# Patient Record
Sex: FEMALE | Race: WHITE | NOT HISPANIC OR LATINO | Employment: OTHER | URBAN - NONMETROPOLITAN AREA
[De-identification: names, ages, dates, MRNs, and addresses within clinical notes are randomized per-mention and may not be internally consistent; named-entity substitution may affect disease eponyms.]

---

## 2017-03-10 ENCOUNTER — COMMUNICATION - GICH (OUTPATIENT)
Dept: FAMILY MEDICINE | Facility: OTHER | Age: 62
End: 2017-03-10

## 2017-03-10 DIAGNOSIS — I10 ESSENTIAL (PRIMARY) HYPERTENSION: ICD-10-CM

## 2017-08-16 ENCOUNTER — HOSPITAL ENCOUNTER (OUTPATIENT)
Dept: RADIOLOGY | Facility: OTHER | Age: 62
End: 2017-08-16
Attending: FAMILY MEDICINE

## 2017-08-16 ENCOUNTER — HISTORY (OUTPATIENT)
Dept: RADIOLOGY | Facility: OTHER | Age: 62
End: 2017-08-16

## 2017-08-16 DIAGNOSIS — Z12.31 ENCOUNTER FOR SCREENING MAMMOGRAM FOR MALIGNANT NEOPLASM OF BREAST: ICD-10-CM

## 2017-09-03 ENCOUNTER — COMMUNICATION - GICH (OUTPATIENT)
Dept: FAMILY MEDICINE | Facility: OTHER | Age: 62
End: 2017-09-03

## 2017-09-03 DIAGNOSIS — I10 ESSENTIAL (PRIMARY) HYPERTENSION: ICD-10-CM

## 2017-10-26 ENCOUNTER — OFFICE VISIT - GICH (OUTPATIENT)
Dept: FAMILY MEDICINE | Facility: OTHER | Age: 62
End: 2017-10-26

## 2017-10-26 ENCOUNTER — HISTORY (OUTPATIENT)
Dept: FAMILY MEDICINE | Facility: OTHER | Age: 62
End: 2017-10-26

## 2017-10-26 DIAGNOSIS — Z23 ENCOUNTER FOR IMMUNIZATION: ICD-10-CM

## 2017-10-26 DIAGNOSIS — R35.0 FREQUENCY OF MICTURITION: ICD-10-CM

## 2017-10-26 LAB
BACTERIA URINE: NORMAL BACTERIA/HPF
BILIRUB UR QL: NEGATIVE
CLARITY, URINE: CLEAR CLARITY
COLOR UR: YELLOW COLOR
EPITHELIAL CELLS: NORMAL EPI/HPF
GLUCOSE URINE: NEGATIVE MG/DL
KETONES UR QL: NEGATIVE MG/DL
LEUKOCYTE ESTERASE URINE: NEGATIVE
NITRITE UR QL STRIP: NEGATIVE
OCCULT BLOOD,URINE - HISTORICAL: ABNORMAL
PH UR: 6 [PH]
PROTEIN QUALITATIVE,URINE - HISTORICAL: NEGATIVE MG/DL
RBC - HISTORICAL: NORMAL /HPF
SP GR UR STRIP: <=1.005
UROBILINOGEN,QUALITATIVE - HISTORICAL: NORMAL EU/DL
WBC - HISTORICAL: NORMAL /HPF

## 2017-11-30 ENCOUNTER — COMMUNICATION - GICH (OUTPATIENT)
Dept: FAMILY MEDICINE | Facility: OTHER | Age: 62
End: 2017-11-30

## 2017-11-30 DIAGNOSIS — I10 ESSENTIAL (PRIMARY) HYPERTENSION: ICD-10-CM

## 2017-12-17 ENCOUNTER — HEALTH MAINTENANCE LETTER (OUTPATIENT)
Age: 62
End: 2017-12-17

## 2017-12-22 ENCOUNTER — COMMUNICATION - GICH (OUTPATIENT)
Dept: FAMILY MEDICINE | Facility: OTHER | Age: 62
End: 2017-12-22

## 2017-12-22 DIAGNOSIS — E03.9 HYPOTHYROIDISM: ICD-10-CM

## 2017-12-27 NOTE — PROGRESS NOTES
Patient Information     Patient Name MRN Sex Laura Reyes [id number] Female 1955      Progress Notes by Ann Schulz at 2017  9:59 AM     Author:  Ann Schulz Service:  (none) Author Type:  (none)     Filed:  2017  9:59 AM Date of Service:  2017  9:59 AM Status:  Signed     :  Ann Schulz            Falls Risk Criteria:    Age 65 and older or under age 4        Sensory deficits    Poor vision    Use of ambulatory aides    Impaired judgment    Unable to walk independently    Meets High Risk criteria for falls:  no

## 2017-12-28 NOTE — TELEPHONE ENCOUNTER
Patient Information     Patient Name MRN Sex Laura Reyes [id number] Female 1955      Telephone Encounter by Emelina Bledsoe RN at 2017  2:14 PM     Author:  Emelina Bledsoe RN Service:  (none) Author Type:  NURS- Registered Nurse     Filed:  2017  2:17 PM Encounter Date:  9/3/2017 Status:  Signed     :  Emelina Bledsoe RN (NURS- Registered Nurse)            Ace Inhibitors    Office visit in the past 12 months or per provider note.    Last visit with ERMIAS SENA was on: 2016 in [facility]  Next visit with ERMIAS SENA is on: No future appointment listed with this provider  Next visit with Family Practice is on: No future appointment listed in this department    Lab test requirements:  Creatinine and Potassium annually, if ordering lab, order BMP.  CREATININE (mg/dL)    Date Value   2016 0.74     POTASSIUM (mmol/L)    Date Value   2016 4.4       Max refill for 12 months from last office visit or per provider note    Limited refill provided - patient is due for annual check and labs in November.    Prescription refilled per RN Medication Refill Policy.................... Emelina Bledsoe RN ....................  2017   2:14 PM

## 2017-12-28 NOTE — TELEPHONE ENCOUNTER
Patient Information     Patient Name MRN Sex Laura Reyes [id number] Female 1955      Telephone Encounter by Patt Calle RN at 2017  3:36 PM     Author:  Patt Calle RN Service:  (none) Author Type:  NURS- Registered Nurse     Filed:  2017  3:38 PM Encounter Date:  2017 Status:  Signed     :  Patt Calle RN (NURS- Registered Nurse)            Ace Inhibitors    Office visit in the past 12 months or per provider note.    Last visit with ERMIAS SENA was on: 10/26/2017 in [facility]  Next visit with ERMIAS SENA is on: No future appointment listed with this provider  Next visit with Family Practice is on: No future appointment listed in this department    Lab test requirements:  Creatinine and Potassium annually, if ordering lab, order BMP.  CREATININE (mg/dL)    Date Value   2016 0.74     POTASSIUM (mmol/L)    Date Value   2016 4.4       Max refill for 12 months from last office visit or per provider note    Patient is due for medication management appointment. Limited refill provided at this time. [employer] message and/or letter sent for reminder to patient. Prescription refilled per RN Medication Refill Policy.................... Patt Calle RN ....................  2017   3:37 PM

## 2017-12-28 NOTE — PATIENT INSTRUCTIONS
Patient Information     Patient Name MRN Laura Torres [id number] Female 1955      Patient Instructions by Adelia Burgess MD at 10/26/2017  2:52 PM     Author:  Adelia Burgess MD  Service:  (none) Author Type:  Physician     Filed:  10/26/2017  2:53 PM  Encounter Date:  10/26/2017 Status:  Addendum     :  Adelia Burgess MD (Physician)        Related Notes: Original Note by Adelia Burgess MD (Physician) filed at 10/26/2017  2:52 PM            1.  Start bactrim;   2.  Urine will be cultured   3.  Stretching exercises for sciatica as you have previously received.  4. Ice or Aspercreme or Salon-Pas  over-the-counter prn      [employer] Citizen of [place]   Urinary Tract Infection in Women   ________________________________________________________________________  KEY POINTS    A urinary tract infection is an infection of your kidneys, ureters, bladder, or urethra.    Your healthcare provider will likely prescribe an antibiotic and medicine to help relieve burning and discomfort.    Follow the full course of treatment prescribed by your healthcare provider. If you were prescribed an antibiotic, take all of it as prescribed, even if your symptoms are gone.  ________________________________________________________________________  What is a urinary tract infection?  Urinary tract infection (UTI) is an infection of one or more parts of the urinary tract. The urinary tract includes your:    Kidneys, which make urine    Ureters, which are the tubes that carry urine from the kidneys to the bladder    Bladder, which stores urine    Urethra, which is the tube that drains urine from the bladder  What is the cause?  Urinary tract infection is usually caused by bacteria. Normally the urinary tract does not have any bacteria or other organisms in it. Bacteria that cause a UTI often spread from the rectum or vagina to the urethra and then to the bladder or kidneys. Urinary tract  infection is common in women because the urethra is short. This makes it easier for bacteria to move up to the bladder. Sometimes bacteria spread from another part of the body through the bloodstream to the urinary tract.  Some of the things that can lead to an infection are:    A blockage in the urinary tract, such as a kidney stone    A sexually transmitted disease or infection (also called an STD or STI)    Getting older, when it may get harder to empty and flush out the bladder completely    Having medical problems such as diabetes, a problem with the immune system, sickle cell anemia, stroke, kidney stones, or any illness or disability that makes it hard to empty your bladder completely    Use of a catheter to drain the bladder    Scarring in the urinary tract from previous infections or surgery  You are more likely to have an infection if:    You just started having sex or have a new sex partner    You are past menopause    You are pregnant  What are the symptoms?  Symptoms may include:    Urinating more often    Feeling an urgent need to urinate or feeling that your bladder is always full    Pain or burning when you urinate    Pain in your lower belly, low back, or your side    Urine that smells bad    Urine that looks cloudy, reddish, or bloody    Fever and chills or sweating    Nausea and vomiting    Leaking of urine    Pain during sex  How is it diagnosed?  Your healthcare provider will ask about your symptoms and medical history and examine you. Tests to diagnose a simple urinary tract infection may include:    Urine tests    Blood tests  If you are having more serious symptoms or frequent infections, you may need one or more tests:    An intravenous pyelogram (IVP), which is a series of X-rays taken after your healthcare provider injects contrast dye into your blood vessels to look for blockages in your kidneys and urinary tract    An ultrasound, which uses sound waves to show pictures of the kidneys and  urinary tract    A pelvic exam    A cystoscopy, which uses a slim, flexible, lighted tube passed through your urethra into your bladder, and usually done by a specialist called a urologist  How is it treated?  Your healthcare provider will most likely prescribe an antibiotic and medicine to help relieve burning and discomfort. Prompt treatment of a UTI usually relieves the symptoms in 1 to 2 days. If your infection has been causing symptoms for several days before treatment or if you have a fever, it may take longer to feel better.  It s important to get prompt treatment for a UTI. If the infection is not treated, it could damage your kidneys and make you very sick. If the infection spreads to your blood, it can be life-threatening. If you are very sick, you may need to be in the hospital and get antibiotics by IV.  How can I take care of myself?  Follow the full course of treatment prescribed by your healthcare provider. If you were prescribed an antibiotic medicine, take the antibiotics for as long as your healthcare provider prescribes, even if you feel better. If you stop taking the medicine too soon, you may not kill all of the bacteria and you may get sick again. If you have side effects from your medicine, talk to your healthcare provider.  Ask your provider:    How and when you will get your test results    How long it will take to recover    If there are activities you should avoid and when you can return to your normal activities    How to take care of yourself at home    What symptoms or problems you should watch for and what to do if you have them    Make sure you know when you should come back for a checkup.    Drink plenty of water each day to cleanse your bladder and urinary tract unless your healthcare provider has told you to limit how much fluid you drink.    Soaking in a tub of warm water for 20 to 30 minutes may help relieve pain.  How can I help prevent urinary tract infection?  You can help  prevent UTIs if you:    Drink enough liquids to keep your urine light yellow in color.    Drink a glass of cranberry juice each day. The juice should be real cranberry juice, not a cranberry-flavored drink.    Don t wait to go to the bathroom if you feel the need to urinate.    Practice safe sex:    Ask your healthcare provider which type of condom, diaphragm, or other birth control is right for you.    Urinate soon after sex.    Keep your genital area clean. If you want to have vaginal sex after anal sex, both partners should wash their genitals first.    Empty your bladder completely when you urinate.    Don t wear a wet bathing suit for long periods of time.    Don t use irritating cosmetics or chemicals in your genital area. This includes, for example, strong soaps, feminine hygiene sprays, douches, scented tampons, sanitary napkins, or panty liners.    Keep your vaginal area clean. Wiping from front to back after using the toilet may help prevent infections. Use mild, unscented soap to wash your genital area gently each time you bathe or shower.    Wear underwear that is all cotton or has a cotton crotch. Pantyhose should also have a cotton crotch. Cotton absorbs moisture better than nylon. Change underwear and pantyhose every day.    During pregnancy, tell your healthcare provider if you often have urinary tract problems.  If you have reached menopause and are not taking estrogen, prescription estrogen vaginal cream may help prevent bladder infections.  Developed by [employer].  Adult Advisor 2016.2 published by [employer].  Last modified: 2016-04-27  Last reviewed: 2016-04-26  This content is reviewed periodically and is subject to change as new health information becomes available. The information is intended to inform and educate and is not a replacement for medical evaluation, advice, diagnosis or treatment by a healthcare professional.  References   Adult Advisor 2016.2 Index    Copyright   2016  [employer], a division of [employer] All rights reserved.

## 2017-12-28 NOTE — PROGRESS NOTES
"Patient Information     Patient Name MRN Sex Laura Reyes [id number] Female 1955      Progress Notes by Adelia Burgess MD at 10/26/2017  1:30 PM     Author:  Adelia Burgess MD Service:  (none) Author Type:  Physician     Filed:  10/26/2017  6:21 PM Encounter Date:  10/26/2017 Status:  Signed     :  Adelia Burgess MD (Physician)            Nursing Notes:   Ivonne Gupta  10/26/2017  1:53 PM  Signed  Patient presents for back pain and increase frequency in urine   Ivonne Gupta LPN........................10/26/2017  1:49 PM         SUBJECTIVE: Laura Romero is a 62 y.o. female who complains of urinary frequency, urgency and low back pain on left side  x 3 days, without flank pain, fever, chills, or abnormal vaginal discharge or bleeding.     Allergies     Allergen  Reactions     Azithromycin Hives     /72  Pulse 72  Ht 1.613 m (5' 3.5\")  Wt 111.6 kg (246 lb)  Breastfeeding? No  BMI 42.89 kg/m2      OBJECTIVE: Appears well, in no apparent distress. The abdomen is soft without tenderness, guarding, mass, rebound or organomegaly. No CVA tenderness or inguinal adenopathy noted.     Results for orders placed or performed in visit on 10/26/17      URINALYSIS W REFLEX MICROSCOPIC IF POSITIVE      Result  Value Ref Range    COLOR                     Yellow Yellow Color    CLARITY                   Clear Clear Clarity    SPECIFIC GRAVITY,URINE    <=1.005 (A) 1.010, 1.015, 1.020, 1.025                    PH,URINE                  6.0 6.0, 7.0, 8.0, 5.5, 6.5, 7.5, 8.5                    UROBILINOGEN,QUALITATIVE  Normal Normal EU/dl    PROTEIN, URINE Negative Negative mg/dL    GLUCOSE, URINE Negative Negative mg/dL    KETONES,URINE             Negative Negative mg/dL    BILIRUBIN,URINE           Negative Negative                    OCCULT BLOOD,URINE        Trace (A) Negative                    NITRITE                   Negative Negative                    LEUKOCYTE " ESTERASE        Negative Negative                   URINALYSIS MICROSCOPIC      Result  Value Ref Range    RBC 0-2 0-2, None Seen /HPF    WBC None Seen 0-2, 3-5, None Seen /HPF    BACTERIA                  None Seen None Seen, Rare, Occasional, Few Bacteria/HPF    EPITHELIAL CELLS          None Seen None Seen, Few Epi/HPF       ASSESSMENT: UTI uncomplicated without evidence of pyelonephritis    PLAN: Treatment per orders - also push fluids, may use Pyridium OTC prn. Call or return to clinic prn if these symptoms worsen or fail to improve as anticipated.  [place]     Ms. Romero's Body mass index is 42.89 kg/(m^2). This is out of the normal range for a 62 y.o. Normal range for ages 18+ is between 18.5 and 24.9. To lose weight we reviewed risks and benefits of appropriate options such as diet, exercise, and medications. Patient's strategy will be  self-directed nutrition plan and self-directed exercise program      Flu shot / boostrix today

## 2017-12-30 NOTE — NURSING NOTE
Patient Information     Patient Name MRN Sex Laura Reyes [id number] Female 1955      Nursing Note by Ivonne Gupta at 10/26/2017  1:30 PM     Author:  Ivonne Gupta Service:  (none) Author Type:  (none)     Filed:  10/26/2017  1:53 PM Encounter Date:  10/26/2017 Status:  Signed     :  Ivonne Gupta            Patient presents for back pain and increase frequency in urine   Ivonne Gupta LPN........................10/26/2017  1:49 PM

## 2018-01-03 NOTE — TELEPHONE ENCOUNTER
Patient Information     Patient Name MRN Sex Laura Reyes [id number] Female 1955      Telephone Encounter by Emelina Bledsoe RN at 3/10/2017  1:00 PM     Author:  Emelina Blesdoe RN Service:  (none) Author Type:  (none)     Filed:  3/10/2017  1:05 PM Encounter Date:  3/10/2017 Status:  Signed     :  Emelina Bledsoe RN (NURS- Registered Nurse)            Ace Inhibitors    Office visit in the past 12 months or per provider note.    Last visit with ERMIAS SENA was on: 2016 in [facility]  Next visit with ERMIAS SENA is on: No future appointment listed with this provider  Next visit with Family Practice is on: No future appointment listed in this department    Lab test requirements:  Creatinine and Potassium annually, if ordering lab, order BMP.  CREATININE (mg/dL)    Date Value   2016 0.74     POTASSIUM (mmol/L)    Date Value   2016 4.4       Max refill for 12 months from last office visit or per provider note    Prescription refilled per RN Medication Refill Policy.................... Emelina Bledsoe ....................  3/10/2017   1:00 PM

## 2018-01-11 ENCOUNTER — COMMUNICATION - GICH (OUTPATIENT)
Dept: FAMILY MEDICINE | Facility: OTHER | Age: 63
End: 2018-01-11

## 2018-01-11 DIAGNOSIS — E78.00 PURE HYPERCHOLESTEROLEMIA: ICD-10-CM

## 2018-01-26 VITALS
WEIGHT: 246 LBS | HEIGHT: 64 IN | HEART RATE: 72 BPM | DIASTOLIC BLOOD PRESSURE: 72 MMHG | BODY MASS INDEX: 42 KG/M2 | SYSTOLIC BLOOD PRESSURE: 124 MMHG

## 2018-02-08 ENCOUNTER — DOCUMENTATION ONLY (OUTPATIENT)
Dept: FAMILY MEDICINE | Facility: OTHER | Age: 63
End: 2018-02-08

## 2018-02-08 PROBLEM — E66.9 OBESITY: Status: ACTIVE | Noted: 2018-02-08

## 2018-02-08 PROBLEM — I83.90 ASYMPTOMATIC VARICOSE VEINS: Status: ACTIVE | Noted: 2018-02-08

## 2018-02-08 PROBLEM — L71.9 ROSACEA: Status: ACTIVE | Noted: 2018-02-08

## 2018-02-08 PROBLEM — E78.00 HYPERCHOLESTEROLEMIA: Status: ACTIVE | Noted: 2018-02-08

## 2018-02-08 RX ORDER — ALBUTEROL SULFATE 90 UG/1
2 AEROSOL, METERED RESPIRATORY (INHALATION) EVERY 4 HOURS PRN
COMMUNITY
Start: 2016-11-03 | End: 2019-04-23

## 2018-02-08 RX ORDER — PRAVASTATIN SODIUM 40 MG
40 TABLET ORAL AT BEDTIME
COMMUNITY
Start: 2016-11-03 | End: 2018-02-28

## 2018-02-08 RX ORDER — CETIRIZINE HYDROCHLORIDE 10 MG/1
10 TABLET ORAL DAILY
COMMUNITY
Start: 2012-12-12

## 2018-02-08 RX ORDER — ASPIRIN 81 MG/1
81 TABLET ORAL
COMMUNITY
Start: 2010-09-29 | End: 2022-12-05

## 2018-02-08 RX ORDER — FLUTICASONE PROPIONATE 50 MCG
2 SPRAY, SUSPENSION (ML) NASAL DAILY
COMMUNITY
Start: 2016-11-03 | End: 2018-02-28

## 2018-02-08 RX ORDER — LEVOTHYROXINE SODIUM 100 UG/1
100 TABLET ORAL
COMMUNITY
Start: 2017-12-22 | End: 2018-02-28

## 2018-02-08 RX ORDER — LISINOPRIL 20 MG/1
20 TABLET ORAL DAILY
COMMUNITY
Start: 2017-11-30 | End: 2018-02-28

## 2018-02-12 NOTE — TELEPHONE ENCOUNTER
Patient Information     Patient Name MRN Sex Laura Reyes [id number] Female 1955      Telephone Encounter by Patt Calle RN at 2017  4:40 PM     Author:  Patt Calle RN Service:  (none) Author Type:  NURS- Registered Nurse     Filed:  2017  4:40 PM Encounter Date:  2017 Status:  Signed     :  Patt Calle RN (NURS- Registered Nurse)            Hypothyroidism    Office visit in the past 12 months or per provider note.    Last visit with ERMIAS SENA was on: 10/26/2017 in [facility]  Next visit with ERMIAS SENA is on: No future appointment listed with this provider  Next visit with Family Practice is on: No future appointment listed in this department    Lab testing requirements:  TSH annually  TSH (uIU/mL)    Date Value   2016 0.51       Max refill for 12 months from last office visit or per provider note.  Patient is due for medication management appointment. Limited refill provided at this time. [employer] message and/or letter sent for reminder to patient. Prescription refilled per RN Medication Refill Policy.................... Patt Calle RN ....................  2017   4:40 PM

## 2018-02-12 NOTE — TELEPHONE ENCOUNTER
Patient Information     Patient Name MRN Laura Torres [id number] Female 1955      Telephone Encounter by Patt Calle RN at 1/15/2018 10:28 AM     Author:  Patt Calle RN Service:  (none) Author Type:  NURS- Registered Nurse     Filed:  1/15/2018 10:30 AM Encounter Date:  2018 Status:  Signed     :  Patt Calle RN (NURS- Registered Nurse)            Statins    Office visit in the past 12 months.    Last visit with ERMIAS SENA was on: 10/26/2017 in [facility]  Next visit with ERMIAS SENA is on: 2018 in [facility]  Next visit with Family Practice is on: 2018 in [facility]    Lab testing requirements:  Lipids annually.  Repeat lipids 6-8 weeks after dosage or drug change.    Last Lipids:  Chol: 216    11/3/2016  T    11/3/2016  HDL:   63    11/3/2016  LDL:  127    11/3/2016  LDL DIRECT:  No results found in past 5 years    .    Concommitant use of fibrates and statins-If it is an addition to the medication list, review note and/or discuss with provider.  If already on medication list, refill.    Max refills 12 months from last office visit.      Patient is due for medication management appointment. Limited refill provided at this time. [employer] message and/or letter sent for reminder to patient. Prescription refilled per RN Medication Refill Policy.................... Patt Calle RN ....................  1/15/2018   10:30 AM

## 2018-02-23 ENCOUNTER — DOCUMENTATION ONLY (OUTPATIENT)
Dept: FAMILY MEDICINE | Facility: OTHER | Age: 63
End: 2018-02-23

## 2018-02-28 ENCOUNTER — OFFICE VISIT (OUTPATIENT)
Dept: FAMILY MEDICINE | Facility: OTHER | Age: 63
End: 2018-02-28
Attending: FAMILY MEDICINE
Payer: COMMERCIAL

## 2018-02-28 VITALS
DIASTOLIC BLOOD PRESSURE: 70 MMHG | HEART RATE: 72 BPM | HEIGHT: 64 IN | WEIGHT: 242 LBS | SYSTOLIC BLOOD PRESSURE: 110 MMHG | BODY MASS INDEX: 41.32 KG/M2

## 2018-02-28 DIAGNOSIS — E78.00 HYPERCHOLESTEROLEMIA: ICD-10-CM

## 2018-02-28 DIAGNOSIS — E03.9 HYPOTHYROIDISM, UNSPECIFIED TYPE: ICD-10-CM

## 2018-02-28 DIAGNOSIS — L98.9 SKIN LESION: ICD-10-CM

## 2018-02-28 DIAGNOSIS — J30.1 CHRONIC ALLERGIC RHINITIS DUE TO POLLEN, UNSPECIFIED SEASONALITY: ICD-10-CM

## 2018-02-28 DIAGNOSIS — Z00.00 ROUTINE GENERAL MEDICAL EXAMINATION AT A HEALTH CARE FACILITY: Primary | ICD-10-CM

## 2018-02-28 DIAGNOSIS — L71.9 ROSACEA: ICD-10-CM

## 2018-02-28 DIAGNOSIS — I15.9 SECONDARY HYPERTENSION: ICD-10-CM

## 2018-02-28 LAB
ALBUMIN SERPL-MCNC: 4.5 G/DL (ref 3.5–5.7)
ALP SERPL-CCNC: 67 U/L (ref 34–104)
ALT SERPL W P-5'-P-CCNC: 18 U/L (ref 7–52)
ANION GAP SERPL CALCULATED.3IONS-SCNC: 6 MMOL/L (ref 3–14)
AST SERPL W P-5'-P-CCNC: 19 U/L (ref 13–39)
BILIRUB SERPL-MCNC: 0.6 MG/DL (ref 0.3–1)
BUN SERPL-MCNC: 17 MG/DL (ref 7–25)
CALCIUM SERPL-MCNC: 8.8 MG/DL (ref 8.6–10.3)
CHLORIDE SERPL-SCNC: 105 MMOL/L (ref 98–107)
CHOLEST SERPL-MCNC: 175 MG/DL
CO2 SERPL-SCNC: 30 MMOL/L (ref 21–31)
CREAT SERPL-MCNC: 0.74 MG/DL (ref 0.6–1.2)
ERYTHROCYTE [DISTWIDTH] IN BLOOD BY AUTOMATED COUNT: 16.3 % (ref 10–15)
GFR SERPL CREATININE-BSD FRML MDRD: 79 ML/MIN/1.7M2
GLUCOSE SERPL-MCNC: 91 MG/DL (ref 70–105)
HCT VFR BLD AUTO: 39 % (ref 35–47)
HDLC SERPL-MCNC: 67 MG/DL (ref 23–92)
HGB BLD-MCNC: 12.6 G/DL (ref 11.7–15.7)
LDLC SERPL CALC-MCNC: 82 MG/DL
MCH RBC QN AUTO: 26.9 PG (ref 26.5–33)
MCHC RBC AUTO-ENTMCNC: 32.3 G/DL (ref 31.5–36.5)
MCV RBC AUTO: 83 FL (ref 78–100)
NONHDLC SERPL-MCNC: 108 MG/DL
PLATELET # BLD AUTO: 261 10E9/L (ref 150–450)
POTASSIUM SERPL-SCNC: 4.2 MMOL/L (ref 3.5–5.1)
PROT SERPL-MCNC: 6.9 G/DL (ref 6.4–8.9)
RBC # BLD AUTO: 4.68 10E12/L (ref 3.8–5.2)
SODIUM SERPL-SCNC: 141 MMOL/L (ref 134–144)
TRIGL SERPL-MCNC: 129 MG/DL
TSH SERPL DL<=0.05 MIU/L-ACNC: 2.32 IU/ML (ref 0.34–5.6)
WBC # BLD AUTO: 6.5 10E9/L (ref 4–11)

## 2018-02-28 PROCEDURE — 85027 COMPLETE CBC AUTOMATED: CPT | Performed by: FAMILY MEDICINE

## 2018-02-28 PROCEDURE — 99396 PREV VISIT EST AGE 40-64: CPT | Performed by: FAMILY MEDICINE

## 2018-02-28 PROCEDURE — 80061 LIPID PANEL: CPT | Performed by: FAMILY MEDICINE

## 2018-02-28 PROCEDURE — 84443 ASSAY THYROID STIM HORMONE: CPT | Performed by: FAMILY MEDICINE

## 2018-02-28 PROCEDURE — 36415 COLL VENOUS BLD VENIPUNCTURE: CPT | Performed by: FAMILY MEDICINE

## 2018-02-28 PROCEDURE — 86803 HEPATITIS C AB TEST: CPT | Performed by: FAMILY MEDICINE

## 2018-02-28 PROCEDURE — 80053 COMPREHEN METABOLIC PANEL: CPT | Performed by: FAMILY MEDICINE

## 2018-02-28 RX ORDER — KRILL/OM-3/DHA/EPA/PHOSPHO/AST 500MG-86MG
1 CAPSULE ORAL DAILY
COMMUNITY
Start: 2018-02-28 | End: 2022-07-25

## 2018-02-28 RX ORDER — FLUTICASONE PROPIONATE 50 MCG
2 SPRAY, SUSPENSION (ML) NASAL DAILY
Qty: 1 BOTTLE | Refills: 3 | Status: SHIPPED | OUTPATIENT
Start: 2018-02-28 | End: 2020-08-27

## 2018-02-28 RX ORDER — LEVOTHYROXINE SODIUM 100 UG/1
100 TABLET ORAL
Qty: 90 TABLET | Refills: 3 | Status: SHIPPED | OUTPATIENT
Start: 2018-02-28 | End: 2019-03-23

## 2018-02-28 RX ORDER — AZELAIC ACID 0.15 G/G
0.5 GEL TOPICAL 2 TIMES DAILY
Qty: 50 G | Refills: 3 | Status: SHIPPED | OUTPATIENT
Start: 2018-02-28 | End: 2019-04-23

## 2018-02-28 RX ORDER — METRONIDAZOLE 7.5 MG/G
GEL TOPICAL 2 TIMES DAILY
Qty: 45 G | Refills: 3 | Status: SHIPPED | OUTPATIENT
Start: 2018-02-28 | End: 2020-02-27

## 2018-02-28 RX ORDER — LISINOPRIL 20 MG/1
20 TABLET ORAL DAILY
Qty: 90 TABLET | Refills: 3 | Status: SHIPPED | OUTPATIENT
Start: 2018-02-28 | End: 2019-03-04

## 2018-02-28 RX ORDER — LEVOTHYROXINE SODIUM 100 UG/1
100 TABLET ORAL
Qty: 90 TABLET | Refills: 3 | Status: CANCELLED | OUTPATIENT
Start: 2018-02-28

## 2018-02-28 RX ORDER — PRAVASTATIN SODIUM 40 MG
40 TABLET ORAL AT BEDTIME
Qty: 90 TABLET | Refills: 3 | Status: SHIPPED | OUTPATIENT
Start: 2018-02-28 | End: 2019-04-23

## 2018-02-28 ASSESSMENT — PAIN SCALES - GENERAL: PAINLEVEL: NO PAIN (0)

## 2018-02-28 NOTE — NURSING NOTE
Patient presents for yearly physical  And would like you to over look her skin.   Ivonne Gupta LPN........................2/28/2018  8:41 AM

## 2018-02-28 NOTE — MR AVS SNAPSHOT
After Visit Summary   2018    Laura Romero    MRN: [id number]           Patient Information     Date Of Birth          1955        Visit Information        Provider Department      2018 8:30 AM Adelia Burgess MD [facility]        Today's Diagnoses     Routine general medical examination at a health care facility    -  1    Hypercholesterolemia        Hypothyroidism, unspecified type        Secondary hypertension        Chronic allergic rhinitis due to pollen, unspecified seasonality        Rosacea        Skin lesion          Care Instructions    Labs will be mailed to your home.   Work on following  a mediterranean diet; Use monosaturated fats ( olive , canola and peanut   oil; nuts, avocados), abundance of plant foods which are minimally processed, fish (salmon).  Exercise 30minutes every day  [facility] staff will call you with your mammogram  Annually.    Try to get 7-8 hours sleep each night.  Rest is important!    Derm referral to [facility]    Rx for rosacea cream     Need to have lesion on anterior chest wall excised and sent for pathology .       Monthly Mole Check Chart  Anyone can get skin cancer. It doesn t matter what your skin color is. Doing a monthly skin check is an important way to spot early signs of melanoma. Melanoma is the deadliest type of skin cancer. But if it s found early, it can be treated. Regular skin checks can help you track any changes in your skin.  Getting started  Each month, check your body for any spots such as freckles, age spots, and moles. Use this sheet to help you by doing the followin. Number each spot you find on the images below.  2. Record the details for each spot, along with the date, on the chart at the bottom of the page.  3. Keep all of your completed charts. This will help you track any changes in your skin over time.  What to look for  When doing a skin check, be sure to use the ABCDEs of melanoma. This means  checking spots and moles for the following:      Asymmetry. One half of the mole is not like the other half.    Border. The edges are not smooth but ragged, notched, or blurred.    Color. Color varies from 1 part of the mole to another and may be tan, brown, or black. In some cases the color can be white, red, or blue.    Diameter. The mole is larger than 6 mm (size of a pencil eraser).    Evolving. The mole is getting larger or changing its shape or color.  How to check  Stand before a full-length mirror and check all parts of your body. For spots on your back or other areas you can't see, have a family member or friend do this for you. Or you can also use a hand mirror to check hard-to-see areas such as your back, buttocks, back of the neck, and scalp. To get a better look when checking your scalp, part your hair.  When to call your healthcare provider  Call your healthcare provider if any of your moles:    Hurt    Itch    Ooze    Bleed    Thicken    Become crusty    Show any of the ABCDEs of melanoma  Monthly Skin Check Chart  Date       Mole #    Asymmetry:  What is the mole's shape? Border of mole Color of mole Diameter of mole Evolving: How has mole changed?                                                                                                   Date Last Reviewed: 3/1/2017    2565-7954 [employer]. [address] [place] [address], [place] [address]. All rights reserved. This information is not intended as a substitute for professional medical care. Always follow your healthcare professional's instructions.        Understanding Mole Excision  Moles are skin growths that are darker than the surrounding skin. They are common and are not normally a problem. But moles can sometimes cause problems. In certain cases, a type of skin cancer called melanoma can grow in or near the mole. In other cases, a mole may be bothersome. In either case, removal (excision) of a problem mole can be done.  Why mole  excision is done  Your healthcare provider may do a mole excision for one or more reasons:    Part or all of a suspicious mole may be removed to check it for cancer.    A mole that is constantly rubbed by clothing or irritated in other ways may be removed to help make you more comfortable.    A mole that is large or on a visible body part can be removed for cosmetic reasons.  How mole excision is done  Removing a mole is often done in the healthcare provider s office. You usually go home the same day.    The area is cleaned. It is then injected with medicine (anesthetic) to numb it.    The provider cuts out the mole. He or she may also remove a certain amount of healthy tissue around the mole to make sure the margins are clear of any dangerous cells.    If needed, the incision may be closed with sutures or staples.  Risks of mole excision    Damage to nearby nerves    Infection of the incision    Keloid or too much scar tissue forms    Pain in the area    Recurrence of the mole    Scarring  Preventing skin cancer  To help protect yourself from skin cancer:    Check your skin regularly for changes in your moles and for new moles.    See your healthcare provider if you have a mole that bleeds, itches, or changes in size, color, or shape.    If you have many moles or have a family history of skin cancer, have moles checked by your healthcare provider at least once a year.    Use clothing and sunscreen that is SPF 30 or higher to protect your skin from the sun.    Never use tanning beds.   Date Last Reviewed: 5/1/2016 2000-2017 [employer]. [address] [place] [address]. All rights reserved. This information is not intended as a substitute for professional medical care. Always follow your healthcare professional's instructions.        Rosacea  Rosacea is a long-lasting (chronic) skin condition affecting the face. In the early stages it causes easy flushing or blushing. Redness may become  long-term (permanent) as the small blood vessels of the face widen (dilate). There may be small, red, pus-filled bumps (pustules). It looks like a bad case of acne, and has been called adult acne. But it is not caused by the same things that cause acne.  Rosacea is a chronic illness. You will have flare-ups that come and go. This may happen every few weeks or every few months. Experts don't know what causes rosacea. If not treated, it tends to get worse over time.  Some men with a more severe form of rosacea develop a condition called rhinophyma. The oil glands on the skin of the nose become blocked and the nose gets bigger. The cheeks also become puffy. Alcohol may increase the flushing. But this condition is not caused by alcohol use.  Rosacea can be treated with topical gels and creams. Oral antibiotics are used for more severe cases. You should see improvement in the first 4 weeks. Dilated blood vessels can be treated with a small electric needle or laser surgery. Rhinophyma can be treated with surgery. Or it may be treated by surgically scraping the skin (dermabrasion).  Home care    Avoid things that make your face red or flushed. These include hot drinks, spicy foods, caffeine, and alcohol.    Exercise indoors or in a cool area to avoid getting overheated.    Avoid excess sun exposure. Use a sunscreen with at least SPF 15 or higher.    Avoid extreme hot or extreme cold weather.    Don't scrub your face. That will irritate the skin and increase redness.    Avoid harsh soaps or moisturizers with irritating ingredients. You may need to use hypoallergenic cosmetics.    Avoid over-the-counter treatments unless instructed by your healthcare provider. Some of these treatments may make rosacea worse.    Try to figure out what triggers your flares (such as stress, sun exposure, or certain foods).  Follow-up care  Follow up with your healthcare provider, or as advised. Contact your provider if your condition is not  responding to the medicines you were given. Getting treatment early can stop it from getting worse.  When to seek medical advice  Call your healthcare provider right away if you have redness, burning, or a gritty feeling in your eyes.  Date Last Reviewed: 8/1/2016 2000-2017 [employer]. [address] [place] [address]. All rights reserved. This information is not intended as a substitute for professional medical care. Always follow your healthcare professional's instructions.        Treating Rosacea     Use sunscreen with at least SPF 15 or more every day.      There is no cure for rosacea. But rosacea can be controlled in most cases, particularly with medical treatment to manage your symptoms.  Your healthcare provider may prescribe 1 or more treatments to put on your skin each day. You may also be given medicines to take by mouth if you have more severe rosacea symptoms. To relieve rosacea eye symptoms, you may need prescription eye drops. Avoid things that can easily cause your rosacea to flare up. These include spicy foods, alcohol, getting embarrassed, and going from a cold environment to a warm environment. You may have surgery to fix the more severe forms of scarring rosacea of the nose. This is called rhinophyma and means the redness and swelling that cause the nose to enlarge.  Your role in medical treatment  How well your treatment works depends partly on you. Follow your healthcare provider s treatment plan. Rosacea symptoms often get better with medicines. But they tend to get worse again if you stop taking the medicines. If your symptoms continue or get worse, ask about other treatment options, including combinations of treatments.  Rosacea self-care  Besides sticking with your treatment plan, follow these tips to care for your skin:    Wash your face twice a day with a gentle facial cleanser. Rinse your skin well with warm (not hot) water. Pat your skin dry with a cotton  towel.    Don t scrub your skin or use sponges, brushes, or other abrasive tools. Doing so can irritate your skin.    Avoid harsh scrubs or astringents. These products can irritate your skin.    If you shave your face, use an electric razor.    Apply sunscreen with at least SPF 15 or more every day. Sun exposure can make rosacea symptoms worse.    Choose skin care products and cosmetics that do not irritate the skin, and are oil-free and fragrance-free.    Avoid putting steroids on the skin sores. Steroids may make rosacea worse.   Getting good results  Learning about rosacea and treating it early is the first step toward controlling this disease. With proper treatment and self-care, you can manage your symptoms and feel better about your skin. The National Rosacea Society is a great resource for information.   What causes rosacea flare-ups?  It s often hard to pinpoint the factors that cause rosacea flare-ups. Different people can be affected by different triggers. Common triggers include weather extremes, sun exposure, alcoholic or hot beverages, spicy food, physical exertion, stress, illness, some skin products, and medicines. To prevent flare-ups, keep a list of things that seem to make your rosacea worse. Then try to avoid them.  Date Last Reviewed: 2/1/2017 2000-2017 [employer]. [address] [place] [address], [place] [address]. All rights reserved. This information is not intended as a substitute for professional medical care. Always follow your healthcare professional's instructions.                Follow-ups after your visit        Additional Services     DERMATOLOGY REFERRAL       Your provider has referred you to: skin surveillance  , rosacea and anterior chest wall lesion     Please be aware that coverage of these services is subject to the terms and limitations of your health insurance plan.  Call member services at your health plan with any benefit or coverage questions.      Please bring the  "following with you to your appointment:    (1) Any X-Rays, CTs or MRIs which have been performed.  Contact the facility where they were done to arrange for  prior to your scheduled appointment.    (2) List of current medications  (3) This referral request   (4) Any documents/labs given to you for this referral                  Who to contact     If you have questions or need follow up information about today's clinic visit or your schedule please contact [facility] directly at [phone].  Normal or non-critical lab and imaging results will be communicated to you by [employer], letter or phone within 4 business days after the clinic has received the results. If you do not hear from us within 7 days, please contact the clinic through [employer] or phone. If you have a critical or abnormal lab result, we will notify you by phone as soon as possible.  Submit refill requests through [employer] or call your pharmacy and they will forward the refill request to us. Please allow 3 business days for your refill to be completed.          Additional Information About Your Visit        [employer] Information     [employer] lets you send messages to your doctor, view your test results, renew your prescriptions, schedule appointments and more. To sign up, go to [email or web address] . Click on \"Log in\" on the left side of the screen, which will take you to the Welcome page. Then click on \"Sign up Now\" on the right side of the page.     You will be asked to enter the access code listed below, as well as some personal information. Please follow the directions to create your username and password.     Your access code is: [email or web address]  Expires: 2018  9:35 AM     Your access code will  in 90 days. If you need help or a new code, please call your [facility] clinic or [phone].        Care EveryWhere ID     This is your Care EveryWhere ID. This could be used by other organizations to access your [facility] " "medical records  [id number]        Your Vitals Were     Pulse Height Breastfeeding? BMI (Body Mass Index)          72 5' 4\" (1.626 m) No 41.54 kg/m2         Blood Pressure from Last 3 Encounters:   02/28/18 110/70   10/26/17 124/72   11/03/16 118/70    Weight from Last 3 Encounters:   02/28/18 242 lb (109.8 kg)   10/26/17 246 lb (111.6 kg)   11/03/16 242 lb (109.8 kg)              We Performed the Following     CBC with platelets     Comprehensive metabolic panel (BMP + Alb, Alk Phos, ALT, AST, Total. Bili, TP)     DERMATOLOGY REFERRAL     Hepatitis C antibody     Lipid Profile (Chol, Trig, HDL, LDL calc) - FASTING     TSH GH          Today's Medication Changes          These changes are accurate as of 2/28/18  9:36 AM.  If you have any questions, ask your nurse or doctor.               Start taking these medicines.        Dose/Directions    Azelaic Acid 15 % gel   Commonly known as:  FINACEA   Used for:  Rosacea   Started by:  Adelia Burgess MD        Dose:  0.5 inch   Apply 0.5 inches topically 2 times daily   Quantity:  50 g   Refills:  3       metroNIDAZOLE 0.75 % topical gel   Commonly known as:  METROGEL   Used for:  Rosacea   Started by:  Adelia Burgess MD        Apply topically 2 times daily   Quantity:  45 g   Refills:  3            Where to get your medicines      These medications were sent to [facility] [address] [place], [place] - [address] [facility] [address]     Phone:  [phone]     fluticasone 50 MCG/ACT spray    lisinopril 20 MG tablet    pravastatin 40 MG tablet         Some of these will need a paper prescription and others can be bought over the counter.  Ask your nurse if you have questions.     Bring a paper prescription for each of these medications     Azelaic Acid 15 % gel    metroNIDAZOLE 0.75 % topical gel                Primary Care Provider Office Phone # Fax #    Adelia Burgess [phone]       " [address] [facility] [address]        Equal Access to Services     ALCIDESKURT ANURAG : Hadii brian miranda laurenariadna Zeali, wayuryda tasharocha, qagiseleta christosleidykeisha segundo, natanael awadshahzadpranav ramírez. So [facility] [phone].    ATENCIÓN: Si habla español, tiene a narayanan disposición servicios gratuitos de asistencia lingüística. Llame al [phone].    We comply with applicable federal civil rights laws and [place] laws. We do not discriminate on the basis of race, color, national origin, age, disability, sex, sexual orientation, or gender identity.            Thank you!     Thank you for choosing [facility]  for your care. Our goal is always to provide you with excellent care. Hearing back from our patients is one way we can continue to improve our services. Please take a few minutes to complete the written survey that you may receive in the mail after your visit with us. Thank you!             Your Updated Medication List - Protect others around you: Learn how to safely use, store and throw away your medicines at www.disposemymeds.org.          This list is accurate as of 2/28/18  9:36 AM.  Always use your most recent med list.                   Brand Name Dispense Instructions for use Diagnosis    albuterol 108 (90 BASE) MCG/ACT Inhaler    PROAIR HFA/PROVENTIL HFA/VENTOLIN HFA     Inhale 2 puffs into the lungs every 4 hours as needed        aspirin EC 81 MG EC tablet      Take 81 mg by mouth daily with food        Azelaic Acid 15 % gel    FINACEA    50 g    Apply 0.5 inches topically 2 times daily    Rosacea       cetirizine 10 MG tablet    zyrTEC     Take 10 mg by mouth daily        fluticasone 50 MCG/ACT spray    FLONASE    1 Bottle    Spray 2 sprays into both nostrils daily    Chronic allergic rhinitis due to pollen, unspecified seasonality       Krill Oil 500 MG Caps      Take 1 capsule by mouth daily        levothyroxine 100 MCG tablet    SYNTHROID/LEVOTHROID     Take 100 mcg by  mouth every morning (before breakfast)        lisinopril 20 MG tablet    PRINIVIL/ZESTRIL    90 tablet    Take 1 tablet (20 mg) by mouth daily    Secondary hypertension       metroNIDAZOLE 0.75 % topical gel    METROGEL    45 g    Apply topically 2 times daily    Rosacea       multivitamin per tablet      Take 1 tablet by mouth daily. Food        pravastatin 40 MG tablet    PRAVACHOL    90 tablet    Take 1 tablet (40 mg) by mouth At Bedtime    Hypercholesterolemia       VITAMIN D PO      Take 1 tablet by mouth daily

## 2018-02-28 NOTE — PROGRESS NOTES
Nursing Notes:   Ivonne Gupta LPN  2018  9:19 AM  Signed  Patient presents for yearly physical  And would like you to over look her skin.   Ivonne Gupta LPN........................2018  8:41 AM     SUBJECTIVE:    Laura Romero is a 63 year old female who presents for annual physical examination.     HPI: Patient is a   Para  Here for annual GYN examination No LMP recorded. Patient has had a hysterectomy..  Ovarians remain .   She is in a monogamous relationship.  Declines sexually transmitted disease testing.  Novaginal complaints.  No breast complaints.    No history of abnormal pap.  No vaginal or pelvic complaints mammogram is current no breast concern         Hypercholesterolemia  She is on Pravachol 40 mg she takes at night.  Denies any myalgias.  Denies any chest pain or palpitate         Hypothyroidism, unspecified type  Patient is on thyroid supplement denies any changes in her hair.  She does note skin changes see below.  She to         Secondary hypertension  With a history of hypertension well controlled on lisinopril.  She denies any cough needs refills of her medication.  No palpitations no headaches no pedal edema.        Chronic allergic rhinitis due to pollen, unspecified seasonality  Would like Flonase before allergy season    Rosacea  She reports that her rosacea continues to worsen.  It is now more irritated and often times I rubs her cheeks.  She notes that is out of medication.  She would like to try Finacea but has recently used MetroGel.      Skin lesion  Patient notes that on her anterior chest wall on the left side she has had some increased growth.  It appears to be cauliflower-like with a reddish color.  She feels like it is 1 of those hemangiomas that has grown the last 2-3 months       ALLERGIES:  Azithromycin     Immunization History:  Immunization History   Administered Date(s) Administered     DTaP, Unspecified 2008     Flu, Unspecified 10/20/2010,  12/07/2011     Influenza Vaccine IM 3yrs+ 4 Valent IIV4 01/14/2016, 11/03/2016, 10/26/2017     TDAP Vaccine (Boostrix) 10/26/2017        CURRENT MEDICATIONS:   Current Outpatient Prescriptions   Medication Sig Dispense Refill     Krill Oil 500 MG CAPS Take 1 capsule by mouth daily       pravastatin (PRAVACHOL) 40 MG tablet Take 1 tablet (40 mg) by mouth At Bedtime 90 tablet 3     lisinopril (PRINIVIL/ZESTRIL) 20 MG tablet Take 1 tablet (20 mg) by mouth daily 90 tablet 3     fluticasone (FLONASE) 50 MCG/ACT spray Spray 2 sprays into both nostrils daily 1 Bottle 3     Azelaic Acid (FINACEA) 15 % gel Apply 0.5 inches topically 2 times daily 50 g 3     metroNIDAZOLE (METROGEL) 0.75 % topical gel Apply topically 2 times daily 45 g 3     albuterol (PROAIR HFA/PROVENTIL HFA/VENTOLIN HFA) 108 (90 BASE) MCG/ACT Inhaler Inhale 2 puffs into the lungs every 4 hours as needed       aspirin EC 81 MG EC tablet Take 81 mg by mouth daily with food       cetirizine (ZYRTEC) 10 MG tablet Take 10 mg by mouth daily       levothyroxine (SYNTHROID/LEVOTHROID) 100 MCG tablet Take 100 mcg by mouth every morning (before breakfast)       [DISCONTINUED] lisinopril (PRINIVIL/ZESTRIL) 20 MG tablet Take 20 mg by mouth daily       [DISCONTINUED] pravastatin (PRAVACHOL) 40 MG tablet Take 40 mg by mouth At Bedtime       [DISCONTINUED] PRAVASTATIN SODIUM PO Take 40 mg by mouth daily       Cholecalciferol (VITAMIN D PO) Take 1 tablet by mouth daily        Multiple Vitamin (MULTIVITAMIN) per tablet Take 1 tablet by mouth daily. Food       [DISCONTINUED] lisinopril (PRINIVIL,ZESTRIL) 20 MG tablet Take 1 tablet by mouth daily.       PROBLEM LIST:  Patient Active Problem List   Diagnosis     S/P nasal septoplasty     Rhinitis, allergic     Perennial allergic rhinitis     ACP (advance care planning)     Allergic rhinitis     HTN (hypertension)     Hypercholesterolemia     Hypothyroidism     Pain in joint, lower leg     Nephrolithiasis     Obesity      Rosacea     Asymptomatic varicose veins       PAST MEDICAL HISTORY:  Past Medical History:   Diagnosis Date     Bacteremia      12/04,Negative coag testing     Calculus of kidney     age 19 and age 50     Chronic sinusitis     5/20/2011     Deviated nasal septum     8/3/2012     Deviated nasal septum     sever turbinate hypertophy and superior septal deviation to right with recurrent sinusitis     Gastro-esophageal reflux disease without esophagitis     8/31/2009     Personal history of other medical treatment (CODE)     rectocele with cervical prolapse     Phlebitis and thrombophlebitis     1/6/11, after varicose vein ablation     Phlebitis and thrombophlebitis of lower extremities, unspecified (CODE)     1/6/2011,Greater saphenous vein left lower extremity     Pneumonia     7/27/2012     SURGICAL HISTORY:  Past Surgical History:   Procedure Laterality Date     COLONOSCOPY      scheduled September 2008     COLONOSCOPY      10/13/08,which was normal.  Next colonoscopy due in 2018.     HYSTERECTOMY VAGINAL      04/27/05, anterior repair with posterior perineorrhaphy and vaginal vault suspension     LAPAROSCOPIC TUBAL LIGATION      No Comments Provided     OTHER SURGICAL HISTORY      ,LITHOTRIPSY, with stent placement for right ureteral stone     OTHER SURGICAL HISTORY      8/11/05,750314,REMOVE,ureteral Stent removal     SEPTOPLASTY      8/2012,with turbinate reduction;  Dr. England     TONSILLECTOMY      No Comments Provided     History   Smoking Status     Never Smoker   Smokeless Tobacco     Never Used       SOCIALHISTORY:  Social History     Social History     Marital status:      Spouse name: N/A     Number of children: N/A     Years of education: N/A     Occupational History     Not on file.     Social History Main Topics     Smoking status: Never Smoker     Smokeless tobacco: Never Used     Alcohol use 0.0 oz/week      Comment: Alcoholic Drinks/day: infrequent social     Drug use: Not on file  "     Comment: Drug use: No     Sexual activity: Yes     Partners: Male     Other Topics Concern     Not on file     Social History Narrative    Retired from  [employer]; now helping with grandchildren    Patient has never smoked.     Passive smoke exposure - no    Alcohol Use - yes    Drug Use - no    HIV/High Risk - no    Regular Exercise - yes    Dis Status 2013     Preloaded 3/26/2013.     FAMILY HISTORY:  Family History   Problem Relation Age of Onset     HEART DISEASE Mother 49     Heart Disease,MI     Hypertension Mother      Hypertension     Arthritis Father      Arthritis,Rheumatoid arthritis     HEART DISEASE Father      Heart Disease, MI     Hypertension Father      Hypertension     Hypertension Sister      Hypertension     Hypertension Brother      Hypertension     Other - See Comments Maternal Grandmother       of blood clot at childbirth.     Other - See Comments Other      Did have a blood clot     Other - See Comments Daughter      GI Disease,Ulcerative colitis diagnosed 2009     Breast Cancer No family hx of      Cancer-breast       REVIEW OF SYSTEMS:     No TIA's or dysphagia. No prolonged cough. No dyspnea or chest pain on exertion.  No abdominal pain, change in bowel habits, black or bloody stools.  No urinary tract symptoms. She is post hysterectomy. No abnormal vaginal bleeding, discharge or unexpected pelvic pain. No new breast lumps, breast pain or nipple discharge.      EXAM:   /70 (BP Location: Right arm, Patient Position: Sitting, Cuff Size: Adult Large)  Pulse 72  Ht 5' 4\" (1.626 m)  Wt 242 lb (109.8 kg)  Breastfeeding? No  BMI 41.54 kg/m2       General Appearance: Normal., Pleasant, alert, appropriate appearance for age. No acute distress,  Psych-alert, oriented, and appropriate affect  HEENT-within normal limits   Neck Exam: Normal.,Supple, no masses or nodes.  Thyroid Exam: No nodules or enlargement., normal to palpation  Lungs:  Clear to " auscultation.  Cardiovascular Exam: Regular rate and rhythm. S1, S2, no murmur, click, gallop, or rubs.  Breast Exam: Normal., No dimpling, nipple retraction or discharge. No masses or nodes. Self breast exam reviewed and taught  Gastrointestinal Exam: Soft, nontender, no abnormal masses or organomegaly.  Genitourinary Exam Female: declines pelvic exam    Skin: scattered hemangioma;   She has one large 4 mm by 6 mm on left anterior chest.   Extremities:  NT, no edema       Results for orders placed or performed in visit on 02/28/18   TSH GH   Result Value Ref Range    Thyrotropin 2.32 0.34 - 5.60 IU/mL   Lipid Profile (Chol, Trig, HDL, LDL calc) - FASTING   Result Value Ref Range    Cholesterol 175 <200 mg/dL    Triglycerides 129 <150 mg/dL    HDL Cholesterol 67 23 - 92 mg/dL    LDL Cholesterol Calculated 82 <100 mg/dL    Non HDL Cholesterol 108 <130 mg/dL   CBC with platelets   Result Value Ref Range    WBC 6.5 4.0 - 11.0 10e9/L    RBC Count 4.68 3.8 - 5.2 10e12/L    Hemoglobin 12.6 11.7 - 15.7 g/dL    Hematocrit 39.0 35.0 - 47.0 %    MCV 83 78 - 100 fl    MCH 26.9 26.5 - 33.0 pg    MCHC 32.3 31.5 - 36.5 g/dL    RDW 16.3 (H) 10.0 - 15.0 %    Platelet Count 261 150 - 450 10e9/L   Comprehensive metabolic panel (BMP + Alb, Alk Phos, ALT, AST, Total. Bili, TP)   Result Value Ref Range    Sodium 141 134 - 144 mmol/L    Potassium 4.2 3.5 - 5.1 mmol/L    Chloride 105 98 - 107 mmol/L    Carbon Dioxide 30 21 - 31 mmol/L    Anion Gap 6 3 - 14 mmol/L    Glucose 91 70 - 105 mg/dL    Urea Nitrogen 17 7 - 25 mg/dL    Creatinine 0.74 0.60 - 1.20 mg/dL    GFR Estimate 79 >60 mL/min/1.7m2    GFR Estimate If Black >90 >60 mL/min/1.7m2    Calcium 8.8 8.6 - 10.3 mg/dL    Bilirubin Total 0.6 0.3 - 1.0 mg/dL    Albumin 4.5 3.5 - 5.7 g/dL    Protein Total 6.9 6.4 - 8.9 g/dL    Alkaline Phosphatase 67 34 - 104 U/L    ALT 18 7 - 52 U/L    AST 19 13 - 39 U/L         ASSESSMENT/PLAN          1. Routine general medical examination at [facility]  care facility   - Hepatitis C antibody    2. Hypercholesterolemia   Work on following  a mediterranean diet; Use monosaturated fats ( olive ,canola and peanut   oil; nuts, avocados), abundance of plant foods which are minimally processed,   fish (salmon).    - pravastatin (PRAVACHOL) 40 MG tablet; Take 1 tablet (40 mg) by mouth At Bedtime  Dispense: 90 tablet; Refill: 3  - Lipid Profile (Chol, Trig, HDL, LDL calc) - FASTING    3. Hypothyroidism, unspecified type      med refilled     4. Secondary hypertension     Patient is to monitor  blood pressureminimum two times a week.   Discussed importance of keeping blood pressure below 130/80 to reduce morbidity and mortality risk of cardiovascular disease.  If remains elevated then return to clinic to discuss medicationoptions to treat hypertension.      5. Chronic allergic rhinitis due to pollen, unspecified seasonality     - fluticasone (FLONASE) 50 MCG/ACT spray; Spray 2 sprays into both nostrils daily  Dispense: 1 Bottle; Refill: 3    6. Rosacea  Printed two Rx for rosacea;   - DERMATOLOGY REFERRAL  - Azelaic Acid (FINACEA) 15 % gel; Apply 0.5 inches topically 2 times daily  Dispense: 50 g; Refill: 3  - metroNIDAZOLE (METROGEL) 0.75 % topical gel; Apply topically 2 times daily  Dispense: 45 g; Refill: 3    7. Skin lesion   recommend lesion on anterior chest be removed.

## 2018-02-28 NOTE — PATIENT INSTRUCTIONS
Labs will be mailed to your home.   Work on following  a mediterranean diet; Use monosaturated fats ( olive , canola and peanut   oil; nuts, avocados), abundance of plant foods which are minimally processed, fish (salmon).  Exercise 30minutes every day  [facility] staff will call you with your mammogram  Annually.    Try to get 7-8 hours sleep each night.  Rest is important!    Derm referral to [place]    Rx for rosacea cream     Need to have lesion on anterior chest wall excised and sent for pathology .       Monthly Mole Check Chart  Anyone can get skin cancer. It doesn t matter what your skin color is. Doing a monthly skin check is an important way to spot early signs of melanoma. Melanoma is the deadliest type of skin cancer. But if it s found early, it can be treated. Regular skin checks can help you track any changes in your skin.  Getting started  Each month, check your body for any spots such as freckles, age spots, and moles. Use this sheet to help you by doing the followin. Number each spot you find on the images below.  2. Record the details for each spot, along with the date, on the chart at the bottom of the page.  3. Keep all of your completed charts. This will help you track any changes in your skin over time.  What to look for  When doing a skin check, be sure to use the ABCDEs of melanoma. This means checking spots and moles for the following:      Asymmetry. One half of the mole is not like the other half.    Border. The edges are not smooth but ragged, notched, or blurred.    Color. Color varies from 1 part of the mole to another and may be tan, brown, or black. In some cases the color can be white, red, or blue.    Diameter. The mole is larger than 6 mm (size of a pencil eraser).    Evolving. The mole is getting larger or changing its shape or color.  How to check  Stand before a full-length mirror and check all parts of your body. For spots on your back or other areas you can't see, have a  family member or friend do this for you. Or you can also use a hand mirror to check hard-to-see areas such as your back, buttocks, back of the neck, and scalp. To get a better look when checking your scalp, part your hair.  When to call your healthcare provider  Call your healthcare provider if any of your moles:    Hurt    Itch    Ooze    Bleed    Thicken    Become crusty    Show any of the ABCDEs of melanoma  Monthly Skin Check Chart  Date       Mole #    Asymmetry:  What is the mole's shape? Border of mole Color of mole Diameter of mole Evolving: How has mole changed?                                                                                                   Date Last Reviewed: 3/1/2017    8920-8666 [employer]. [address] [place] [address], [place] [address]. All rights reserved. This information is not intended as a substitute for professional medical care. Always follow your healthcare professional's instructions.        Understanding Mole Excision  Moles are skin growths that are darker than the surrounding skin. They are common and are not normally a problem. But moles can sometimes cause problems. In certain cases, a type of skin cancer called melanoma can grow in or near the mole. In other cases, a mole may be bothersome. In either case, removal (excision) of a problem mole can be done.  Why mole excision is done  Your healthcare provider may do a mole excision for one or more reasons:    Part or all of a suspicious mole may be removed to check it for cancer.    A mole that is constantly rubbed by clothing or irritated in other ways may be removed to help make you more comfortable.    A mole that is large or on a visible body part can be removed for cosmetic reasons.  How mole excision is done  Removing a mole is often done in the healthcare provider s office. You usually go home the same day.    The area is cleaned. It is then injected with medicine (anesthetic) to numb it.    The provider  cuts out the mole. He or she may also remove a certain amount of healthy tissue around the mole to make sure the margins are clear of any dangerous cells.    If needed, the incision may be closed with sutures or staples.  Risks of mole excision    Damage to nearby nerves    Infection of the incision    Keloid or too much scar tissue forms    Pain in the area    Recurrence of the mole    Scarring  Preventing skin cancer  To help protect yourself from skin cancer:    Check your skin regularly for changes in your moles and for new moles.    See your healthcare provider if you have a mole that bleeds, itches, or changes in size, color, or shape.    If you have many moles or have a family history of skin cancer, have moles checked by your healthcare provider at least once a year.    Use clothing and sunscreen that is SPF 30 or higher to protect your skin from the sun.    Never use tanning beds.   Date Last Reviewed: 5/1/2016 2000-2017 [employer]. [address] [place] [address]. All rights reserved. This information is not intended as a substitute for professional medical care. Always follow your healthcare professional's instructions.        Rosacea  Rosacea is a long-lasting (chronic) skin condition affecting the face. In the early stages it causes easy flushing or blushing. Redness may become long-term (permanent) as the small blood vessels of the face widen (dilate). There may be small, red, pus-filled bumps (pustules). It looks like a bad case of acne, and has been called adult acne. But it is not caused by the same things that cause acne.  Rosacea is a chronic illness. You will have flare-ups that come and go. This may happen every few weeks or every few months. Experts don't know what causes rosacea. If not treated, it tends to get worse over time.  Some men with a more severe form of rosacea develop a condition called rhinophyma. The oil glands on the skin of the nose become blocked  and the nose gets bigger. The cheeks also become puffy. Alcohol may increase the flushing. But this condition is not caused by alcohol use.  Rosacea can be treated with topical gels and creams. Oral antibiotics are used for more severe cases. You should see improvement in the first 4 weeks. Dilated blood vessels can be treated with a small electric needle or laser surgery. Rhinophyma can be treated with surgery. Or it may be treated by surgically scraping the skin (dermabrasion).  Home care    Avoid things that make your face red or flushed. These include hot drinks, spicy foods, caffeine, and alcohol.    Exercise indoors or in a cool area to avoid getting overheated.    Avoid excess sun exposure. Use a sunscreen with at least SPF 15 or higher.    Avoid extreme hot or extreme cold weather.    Don't scrub your face. That will irritate the skin and increase redness.    Avoid harsh soaps or moisturizers with irritating ingredients. You may need to use hypoallergenic cosmetics.    Avoid over-the-counter treatments unless instructed by your healthcare provider. Some of these treatments may make rosacea worse.    Try to figure out what triggers your flares (such as stress, sun exposure, or certain foods).  Follow-up care  Follow up with your healthcare provider, or as advised. Contact your provider if your condition is not responding to the medicines you were given. Getting treatment early can stop it from getting worse.  When to seek medical advice  Call your healthcare provider right away if you have redness, burning, or a gritty feeling in your eyes.  Date Last Reviewed: 8/1/2016 2000-2017 [employer]. [address] [place] [address], [place] [address]. All rights reserved. This information is not intended as a substitute for professional medical care. Always follow your healthcare professional's instructions.        Treating Rosacea     Use sunscreen with at least SPF 15 or more every day.      There is no  cure for rosacea. But rosacea can be controlled in most cases, particularly with medical treatment to manage your symptoms.  Your healthcare provider may prescribe 1 or more treatments to put on your skin each day. You may also be given medicines to take by mouth if you have more severe rosacea symptoms. To relieve rosacea eye symptoms, you may need prescription eye drops. Avoid things that can easily cause your rosacea to flare up. These include spicy foods, alcohol, getting embarrassed, and going from a cold environment to a warm environment. You may have surgery to fix the more severe forms of scarring rosacea of the nose. This is called rhinophyma and means the redness and swelling that cause the nose to enlarge.  Your role in medical treatment  How well your treatment works depends partly on you. Follow your healthcare provider s treatment plan. Rosacea symptoms often get better with medicines. But they tend to get worse again if you stop taking the medicines. If your symptoms continue or get worse, ask about other treatment options, including combinations of treatments.  Rosacea self-care  Besides sticking with your treatment plan, follow these tips to care for your skin:    Wash your face twice a day with a gentle facial cleanser. Rinse your skin well with warm (not hot) water. Pat your skin dry with a cotton towel.    Don t scrub your skin or use sponges, brushes, or other abrasive tools. Doing so can irritate your skin.    Avoid harsh scrubs or astringents. These products can irritate your skin.    If you shave your face, use an electric razor.    Apply sunscreen with at least SPF 15 or more every day. Sun exposure can make rosacea symptoms worse.    Choose skin care products and cosmetics that do not irritate the skin, and are oil-free and fragrance-free.    Avoid putting steroids on the skin sores. Steroids may make rosacea worse.   Getting good results  Learning about rosacea and treating it early is the  first step toward controlling this disease. With proper treatment and self-care, you can manage your symptoms and feel better about your skin. The National Rosacea Society is a great resource for information.   What causes rosacea flare-ups?  It s often hard to pinpoint the factors that cause rosacea flare-ups. Different people can be affected by different triggers. Common triggers include weather extremes, sun exposure, alcoholic or hot beverages, spicy food, physical exertion, stress, illness, some skin products, and medicines. To prevent flare-ups, keep a list of things that seem to make your rosacea worse. Then try to avoid them.  Date Last Reviewed: 2/1/2017 2000-2017 [employer]. [address] [place] [address], [place] [address]. All rights reserved. This information is not intended as a substitute for professional medical care. Always follow your healthcare professional's instructions.

## 2018-02-28 NOTE — LETTER
March 2, 2018      Laura Romero  [address] [place] [address]        Dear ,    We are writing to inform you of your test results.    Your test results fall within the expected range(s) or remain unchanged from previous results.  Please continue with current treatment plan.    Resulted Orders   TSH GH   Result Value Ref Range    Thyrotropin 2.32 0.34 - 5.60 IU/mL   Lipid Profile (Chol, Trig, HDL, LDL calc) - FASTING   Result Value Ref Range    Cholesterol 175 <200 mg/dL    Triglycerides 129 <150 mg/dL    HDL Cholesterol 67 23 - 92 mg/dL    LDL Cholesterol Calculated 82 <100 mg/dL      Comment:      Desirable:       <100 mg/dl    Non HDL Cholesterol 108 <130 mg/dL   CBC with platelets   Result Value Ref Range    WBC 6.5 4.0 - 11.0 10e9/L    RBC Count 4.68 3.8 - 5.2 10e12/L    Hemoglobin 12.6 11.7 - 15.7 g/dL    Hematocrit 39.0 35.0 - 47.0 %    MCV 83 78 - 100 fl    MCH 26.9 26.5 - 33.0 pg    MCHC 32.3 31.5 - 36.5 g/dL    RDW 16.3 (H) 10.0 - 15.0 %    Platelet Count 261 150 - 450 10e9/L   Comprehensive metabolic panel (BMP + Alb, Alk Phos, ALT, AST, Total. Bili, TP)   Result Value Ref Range    Sodium 141 134 - 144 mmol/L    Potassium 4.2 3.5 - 5.1 mmol/L    Chloride 105 98 - 107 mmol/L    Carbon Dioxide 30 21 - 31 mmol/L    Anion Gap 6 3 - 14 mmol/L    Glucose 91 70 - 105 mg/dL    Urea Nitrogen 17 7 - 25 mg/dL    Creatinine 0.74 0.60 - 1.20 mg/dL    GFR Estimate 79 >60 mL/min/1.7m2    GFR Estimate If Black >90 >60 mL/min/1.7m2    Calcium 8.8 8.6 - 10.3 mg/dL    Bilirubin Total 0.6 0.3 - 1.0 mg/dL    Albumin 4.5 3.5 - 5.7 g/dL    Protein Total 6.9 6.4 - 8.9 g/dL    Alkaline Phosphatase 67 34 - 104 U/L    ALT 18 7 - 52 U/L    AST 19 13 - 39 U/L   Hepatitis C antibody   Result Value Ref Range    Hepatitis C Antibody Nonreactive NR^Nonreactive      Comment:      Assay performance characteristics have not been established for newborns,   infants, and children         If you have any questions or  concerns, please call the clinic at the number listed above.       Sincerely,        ERMIAS SENA MD

## 2018-02-28 NOTE — LETTER
February 28, 2018      Laura BEBETO Nick  [address]  [place] [address]        Dear ,    We are writing to inform you of your test results.    Your test results fall within the expected range(s) or remain unchanged from previous results.  Please continue with current treatment plan.  Keep appointment with dermatologist.  You need to have lesion on your anterior chest removed.     Resulted Orders   TSH GH   Result Value Ref Range    Thyrotropin 2.32 0.34 - 5.60 IU/mL   Lipid Profile (Chol, Trig, HDL, LDL calc) - FASTING   Result Value Ref Range    Cholesterol 175 <200 mg/dL    Triglycerides 129 <150 mg/dL    HDL Cholesterol 67 23 - 92 mg/dL    LDL Cholesterol Calculated 82 <100 mg/dL      Comment:      Desirable:       <100 mg/dl    Non HDL Cholesterol 108 <130 mg/dL   CBC with platelets   Result Value Ref Range    WBC 6.5 4.0 - 11.0 10e9/L    RBC Count 4.68 3.8 - 5.2 10e12/L    Hemoglobin 12.6 11.7 - 15.7 g/dL    Hematocrit 39.0 35.0 - 47.0 %    MCV 83 78 - 100 fl    MCH 26.9 26.5 - 33.0 pg    MCHC 32.3 31.5 - 36.5 g/dL    RDW 16.3 (H) 10.0 - 15.0 %    Platelet Count 261 150 - 450 10e9/L   Comprehensive metabolic panel (BMP + Alb, Alk Phos, ALT, AST, Total. Bili, TP)   Result Value Ref Range    Sodium 141 134 - 144 mmol/L    Potassium 4.2 3.5 - 5.1 mmol/L    Chloride 105 98 - 107 mmol/L    Carbon Dioxide 30 21 - 31 mmol/L    Anion Gap 6 3 - 14 mmol/L    Glucose 91 70 - 105 mg/dL    Urea Nitrogen 17 7 - 25 mg/dL    Creatinine 0.74 0.60 - 1.20 mg/dL    GFR Estimate 79 >60 mL/min/1.7m2    GFR Estimate If Black >90 >60 mL/min/1.7m2    Calcium 8.8 8.6 - 10.3 mg/dL    Bilirubin Total 0.6 0.3 - 1.0 mg/dL    Albumin 4.5 3.5 - 5.7 g/dL    Protein Total 6.9 6.4 - 8.9 g/dL    Alkaline Phosphatase 67 34 - 104 U/L    ALT 18 7 - 52 U/L    AST 19 13 - 39 U/L       If you have any questions or concerns, please call the clinic at the number listed above.       Sincerely,        ERMIAS SENA,  MD

## 2018-03-01 LAB — HCV AB SERPL QL IA: NONREACTIVE

## 2018-05-03 ENCOUNTER — OFFICE VISIT (OUTPATIENT)
Dept: FAMILY MEDICINE | Facility: OTHER | Age: 63
End: 2018-05-03
Attending: FAMILY MEDICINE
Payer: COMMERCIAL

## 2018-05-03 VITALS
HEART RATE: 75 BPM | WEIGHT: 245 LBS | DIASTOLIC BLOOD PRESSURE: 80 MMHG | BODY MASS INDEX: 41.83 KG/M2 | SYSTOLIC BLOOD PRESSURE: 122 MMHG | HEIGHT: 64 IN

## 2018-05-03 DIAGNOSIS — I15.9 SECONDARY HYPERTENSION: Primary | ICD-10-CM

## 2018-05-03 DIAGNOSIS — G89.29 CHRONIC PAIN OF RIGHT KNEE: ICD-10-CM

## 2018-05-03 DIAGNOSIS — E66.9 OBESITY, UNSPECIFIED CLASSIFICATION, UNSPECIFIED OBESITY TYPE, UNSPECIFIED WHETHER SERIOUS COMORBIDITY PRESENT: ICD-10-CM

## 2018-05-03 DIAGNOSIS — M25.561 CHRONIC PAIN OF RIGHT KNEE: ICD-10-CM

## 2018-05-03 PROCEDURE — 99213 OFFICE O/P EST LOW 20 MIN: CPT | Performed by: FAMILY MEDICINE

## 2018-05-03 NOTE — MR AVS SNAPSHOT
"              After Visit Summary   5/3/2018    Laura Romero    MRN: [id number]           Patient Information     Date Of Birth          1955        Visit Information        Provider Department      5/3/2018 3:45 PM Gloria Mcnally MD [facility]        Today's Diagnoses     Secondary hypertension    -  1       Follow-ups after your visit        Who to contact     If you have questions or need follow up information about today's clinic visit or your schedule please contact [facility] directly at [phone].  Normal or non-critical lab and imaging results will be communicated to you by Crocshart, letter or phone within 4 business days after the clinic has received the results. If you do not hear from us within 7 days, please contact the clinic through Fotoupt or phone. If you have a critical or abnormal lab result, we will notify you by phone as soon as possible.  Submit refill requests through haystagg or call your pharmacy and they will forward the refill request to us. Please allow 3 business days for your refill to be completed.          Additional Information About Your Visit        MyChart Information     haystagg lets you send messages to your doctor, view your test results, renew your prescriptions, schedule appointments and more. To sign up, go to [email or web address] . Click on \"Log in\" on the left side of the screen, which will take you to the Welcome page. Then click on \"Sign up Now\" on the right side of the page.     You will be asked to enter the access code listed below, as well as some personal information. Please follow the directions to create your username and password.     Your access code is: [email or web address]  Expires: 2018 10:35 AM     Your access code will  in 90 days. If you need help or a new code, please call your [facility] clinic or [phone].        Care EveryWhere ID     This is your Care EveryWhere ID. This could be " "used by other organizations to access your [place] medical records  [id number]        Your Vitals Were     Pulse Height BMI (Body Mass Index)             75 5' 4\" (1.626 m) 42.05 kg/m2          Blood Pressure from Last 3 Encounters:   05/03/18 122/80   02/28/18 110/70   10/26/17 124/72    Weight from Last 3 Encounters:   05/03/18 245 lb (111.1 kg)   02/28/18 242 lb (109.8 kg)   10/26/17 246 lb (111.6 kg)              Today, you had the following     No orders found for display       Primary Care Provider Office Phone # Fax #    Adelia Burgess [phone]       [address] [facility] [address]        Equal Access to Services     BRITTANEY OSBORN : Hadii brian eduardo Soapril, waaxda luqadaha, qaybta kaalmada adeegyakeisha, natanael lindsey . So [facility] [phone].    ATENCIÓN: Si habla español, tiene a narayanan disposición servicios gratuitos de asistencia lingüística. Llame al [phone].    We comply with applicable federal civil rights laws and [place] laws. We do not discriminate on the basis of race, color, national origin, age, disability, sex, sexual orientation, or gender identity.            Thank you!     Thank you for choosing [facility] AND \[facility] [place] [facility]\""  for your care. Our goal is always to provide you with excellent care. Hearing back from our patients is one way we can continue to improve our services. Please take a few minutes to complete the written survey that you may receive in the mail after your visit with us. Thank you!             Your Updated Medication List - Protect others around you: Learn how to safely use, store and throw away your medicines at www.disposemymeds.org.          This list is accurate as of 5/3/18  4:30 PM.  Always use your most recent med list.                   Brand Name Dispense Instructions for use Diagnosis    albuterol 108 (90 Base) MCG/ACT Inhaler    PROAIR HFA/PROVENTIL HFA/VENTOLIN HFA     Inhale 2 puffs into the " lungs every 4 hours as needed        aspirin EC 81 MG EC tablet      Take 81 mg by mouth daily with food        Azelaic Acid 15 % gel    FINACEA    50 g    Apply 0.5 inches topically 2 times daily    Rosacea       cetirizine 10 MG tablet    zyrTEC     Take 10 mg by mouth daily        fluticasone 50 MCG/ACT spray    FLONASE    1 Bottle    Spray 2 sprays into both nostrils daily    Chronic allergic rhinitis due to pollen, unspecified seasonality       Krill Oil 500 MG Caps      Take 1 capsule by mouth daily        levothyroxine 100 MCG tablet    SYNTHROID/LEVOTHROID    90 tablet    Take 1 tablet (100 mcg) by mouth every morning (before breakfast)    Hypothyroidism, unspecified type       lisinopril 20 MG tablet    PRINIVIL/ZESTRIL    90 tablet    Take 1 tablet (20 mg) by mouth daily    Secondary hypertension       metroNIDAZOLE 0.75 % topical gel    METROGEL    45 g    Apply topically 2 times daily    Rosacea       multivitamin per tablet      Take 1 tablet by mouth daily. Food        pravastatin 40 MG tablet    PRAVACHOL    90 tablet    Take 1 tablet (40 mg) by mouth At Bedtime    Hypercholesterolemia       VITAMIN D PO      Take 1 tablet by mouth daily

## 2018-05-03 NOTE — PROGRESS NOTES
SUBJECTIVE:   Laura Romero is a 63 year old female who presents to clinic today to establish care.  She has previously followed with Dr. Felicia Burgess, who is leaving the clinic.  We reviewed her past medical history and medical problems.  She just had a physical in February.  Her labs are up-to-date.  We reviewed that her blood pressure is in very good range today.  She states that her biggest frustration is that she has a right knee that has been giving her a lot of pain over the last few years which is gradually getting worse.  It has been quite a few years since she had any x-rays.  At this time, she feels that she is not ready to intervene on this at all and therefore does not want any follow-up imaging done at this time.    Her other major complaint is her weight.  She states that she is been slightly heavy all of her life, but weight has gradually crept on more so as she has gotten older.  With her knee difficulties, she finds it difficult to exercise but is still managing to go to [facility] about 3 days a week for pool exercises.  She is also trying to walk every other day as well.    HPI    I personally reviewed medications/allergies/history listed below:    Patient Active Problem List    Diagnosis Date Noted     Hypercholesterolemia 02/08/2018     Priority: Medium     Obesity 02/08/2018     Priority: Medium     Overview:   Obesity, BMI 41.53;   Has joined exercise program and working on dietary changes        Rosacea 02/08/2018     Priority: Medium     Asymptomatic varicose veins 02/08/2018     Priority: Medium     Overview:   preloader comment: selections available to preload differs from paper chart.    *Varicosities of the left leg.       ACP (advance care planning) 05/12/2016     Priority: Medium     Advance Care Planning 5/12/2016: ACP Review of Chart / Resources Provided:  Reviewed chart for advance care plan.  Laura Romero has been provided information and resources to begin or update their  advance care plan.  Added by Siri Westbrook             S/CHRISTINE nasal septoplasty 03/27/2013     Priority: Medium     Rhinitis, allergic 03/27/2013     Priority: Medium     Perennial allergic rhinitis 03/27/2013     Priority: Medium     Nephrolithiasis 12/12/2012     Priority: Medium     Allergic rhinitis 02/03/2012     Priority: Medium     Pain in joint, lower leg 05/04/2011     Priority: Medium     Overview:   possible meniscal tear       HTN (hypertension) 10/04/2010     Priority: Medium     Hypothyroidism 10/04/2010     Priority: Medium     Past Medical History:   Diagnosis Date     Bacteremia      12/04,Negative coag testing     Calculus of kidney     age 19 and age 50     Chronic sinusitis     5/20/2011     Deviated nasal septum     8/3/2012     Deviated nasal septum     sever turbinate hypertophy and superior septal deviation to right with recurrent sinusitis     Gastro-esophageal reflux disease without esophagitis     8/31/2009     Personal history of other medical treatment (CODE)     rectocele with cervical prolapse     Phlebitis and thrombophlebitis     1/6/11, after varicose vein ablation     Phlebitis and thrombophlebitis of lower extremities, unspecified (CODE)     1/6/2011,Greater saphenous vein left lower extremity     Pneumonia     7/27/2012      Past Surgical History:   Procedure Laterality Date     COLONOSCOPY      scheduled September 2008     COLONOSCOPY      10/13/08,which was normal.  Next colonoscopy due in 2018.     HYSTERECTOMY VAGINAL      04/27/05, anterior repair with posterior perineorrhaphy and vaginal vault suspension     LAPAROSCOPIC TUBAL LIGATION      No Comments Provided     OTHER SURGICAL HISTORY      ,LITHOTRIPSY, with stent placement for right ureteral stone     OTHER SURGICAL HISTORY      8/11/05,021549,REMOVE,ureteral Stent removal     SEPTOPLASTY      8/2012,with turbinate reduction;  Dr. England     TONSILLECTOMY      No Comments Provided     Family History   Problem  Relation Age of Onset     HEART DISEASE Mother 49     Heart Disease,MI     Hypertension Mother      Hypertension     Arthritis Father      Arthritis,Rheumatoid arthritis     HEART DISEASE Father      Heart Disease, MI     Hypertension Father      Hypertension     Other - See Comments Maternal Grandmother       of blood clot at childbirth.     Hypertension Sister      Hypertension     Hypertension Brother      Hypertension     Other - See Comments Other      Did have a blood clot     Other - See Comments Daughter      GI Disease,Ulcerative colitis diagnosed 2009     Breast Cancer No family hx of      Cancer-breast     Social History   Substance Use Topics     Smoking status: Never Smoker     Smokeless tobacco: Never Used     Alcohol use 0.0 oz/week      Comment: Alcoholic Drinks/day: infrequent social     Social History     Social History Narrative    Retired from  [employer]; now helping with grandchildren    Patient has never smoked.     Passive smoke exposure - no    Alcohol Use - yes    Drug Use - no    HIV/High Risk - no    Regular Exercise - yes    Dis Status 2013     Preloaded 3/26/2013.     Current Outpatient Prescriptions   Medication Sig Dispense Refill     albuterol (PROAIR HFA/PROVENTIL HFA/VENTOLIN HFA) 108 (90 BASE) MCG/ACT Inhaler Inhale 2 puffs into the lungs every 4 hours as needed       aspirin EC 81 MG EC tablet Take 81 mg by mouth daily with food       Azelaic Acid (FINACEA) 15 % gel Apply 0.5 inches topically 2 times daily 50 g 3     cetirizine (ZYRTEC) 10 MG tablet Take 10 mg by mouth daily       Cholecalciferol (VITAMIN D PO) Take 1 tablet by mouth daily        fluticasone (FLONASE) 50 MCG/ACT spray Spray 2 sprays into both nostrils daily 1 Bottle 3     Krill Oil 500 MG CAPS Take 1 capsule by mouth daily       levothyroxine (SYNTHROID/LEVOTHROID) 100 MCG tablet Take 1 tablet (100 mcg) by mouth every morning (before breakfast) 90 tablet 3     lisinopril (PRINIVIL/ZESTRIL) 20 MG  "tablet Take 1 tablet (20 mg) by mouth daily 90 tablet 3     metroNIDAZOLE (METROGEL) 0.75 % topical gel Apply topically 2 times daily 45 g 3     Multiple Vitamin (MULTIVITAMIN) per tablet Take 1 tablet by mouth daily. Food       pravastatin (PRAVACHOL) 40 MG tablet Take 1 tablet (40 mg) by mouth At Bedtime 90 tablet 3     Allergies   Allergen Reactions     Azithromycin Hives     Zithromax         Review of Systems   Constitutional: Negative for activity change and fever.   Respiratory: Negative for cough.    Psychiatric/Behavioral: Negative for mood changes.        OBJECTIVE:     /80 (BP Location: Right arm, Patient Position: Sitting, Cuff Size: Adult Large)  Pulse 75  Ht 5' 4\" (1.626 m)  Wt 245 lb (111.1 kg)  BMI 42.05 kg/m2  Body mass index is 42.05 kg/(m^2).  Physical Exam   Constitutional: She appears well-developed.   HENT:   Head: Normocephalic.   Eyes: Pupils are equal, round, and reactive to light.   Neck: Normal range of motion. Neck supple. No thyromegaly present.   Cardiovascular: Normal rate, regular rhythm and normal heart sounds.    No murmur heard.  Pulmonary/Chest: Effort normal and breath sounds normal. No respiratory distress. She has no wheezes. She has no rales.   Musculoskeletal: She exhibits no edema.   Lymphadenopathy:     She has no cervical adenopathy.   Psychiatric: She has a normal mood and affect.       PHQ-2 Score:     PHQ-2 ( 1999 [employer]) 5/3/2018 2/28/2018   Q1: Little interest or pleasure in doing things 0 0   Q2: Feeling down, depressed or hopeless 0 0   PHQ-2 Score 0 0       I personally reviewed results withpatient as listed below:   Diagnostic Test Results:  none     ASSESSMENT/PLAN:       ICD-10-CM    1. Secondary hypertension I15.9    2. Obesity, unspecified classification, unspecified obesity type, unspecified whether serious comorbidity present E66.9    3. Chronic pain of right knee M25.561     G89.29        1.  Blood pressure is well controlled.  Meds are up-to-date " on refills.  Her labs are up-to-date as well.  2.  Discussed diet and exercise in some things that she could try to help with weight loss.  Declines referral to dietitian at this time.  3.  Likely has at least some degree of underlying osteoarthritis.  At this time, she declines offer for referral to physical therapy or orthopedics.  She also does not want to pursue further imaging currently.  She will let me know if she changes her mind.    Gloria Mcnally MD  [facility] AND \[facility] [place] [facility]\""

## 2018-05-06 ASSESSMENT — ENCOUNTER SYMPTOMS
FEVER: 0
COUGH: 0
ACTIVITY CHANGE: 0

## 2018-07-09 ENCOUNTER — MYC MEDICAL ADVICE (OUTPATIENT)
Dept: FAMILY MEDICINE | Facility: OTHER | Age: 63
End: 2018-07-09

## 2018-07-10 ENCOUNTER — MYC MEDICAL ADVICE (OUTPATIENT)
Dept: FAMILY MEDICINE | Facility: OTHER | Age: 63
End: 2018-07-10

## 2018-07-10 DIAGNOSIS — E03.9 HYPOTHYROIDISM, UNSPECIFIED TYPE: Primary | ICD-10-CM

## 2018-07-23 NOTE — PROGRESS NOTES
Patient Information     Patient Name  Laura Romero MRN  [id number] Sex  Female   1955      Letter by Adelia Burgess MD at      Author:  Adelia Burgess MD Service:  (none) Author Type:  (none)    Filed:   Encounter Date:  2017 Status:  (Other)           Laura Romero  [address]  [facility] [address]          2017    Dear Ms. Romero:    This letter is to remind you that you are due for your annual exam and labs with Adelia Burgess MD. Your last comprehensive visit was more than 12 months ago.    A LIMITED refill of   Orders Placed This Encounter      lisinopril (PRINIVIL; ZESTRIL) 20 mg tablet has been called into your pharmacy. Additional refills require you to complete this appointment.    Please call the clinic at [phone] to schedule your appointment.    If you should require additional refills before your scheduled appointment, please contact your pharmacy and we will refill your medication until the date of your appointment.    If you are no longer seeing Adelia Burgess MD for primary care, please call to let us know. Doing so will remove you from our call/contact list.      Thank you for choosing [facility] and [facility] for your health care needs.    Sincerely,    Refill RN  [facility]

## 2018-07-24 NOTE — PROGRESS NOTES
Patient Information     Patient Name  Laura Romero MRN  [id number] Sex  Female   1955      Letter by Adelia Burgess MD at      Author:  Adelia Burgess MD Service:  (none) Author Type:  (none)    Filed:   Encounter Date:  2017 Status:  (Other)           Laura Romero  [address] [facility] [address]          2017    Dear Ms. Romero:    This letter is to remind you that you are due for your annual exam with Adelia Burgess MD. Your last comprehensive visit was more than 12 months ago.    A LIMITED refill of   Orders Placed This Encounter      levothyroxine (SYNTHROID) 100 mcg tablet has been called into your pharmacy. Additional refills require you to complete this appointment.    Please call the clinic at [phone] to schedule your appointment.    If you should require additional refills before your scheduled appointment, please contact your pharmacy and we will refill your medication until the date of your appointment.    If you are no longer seeing Adelia Burgess MD for primary care, please call to let us know. Doing so will remove you from our call/contact list.      Thank you for choosing [facility] and [facility] for your health care needs.    Sincerely,    Refill RN  [facility]

## 2018-08-22 ENCOUNTER — HOSPITAL ENCOUNTER (OUTPATIENT)
Dept: MAMMOGRAPHY | Facility: OTHER | Age: 63
Discharge: HOME | End: 2018-08-22
Attending: FAMILY MEDICINE | Admitting: FAMILY MEDICINE
Payer: COMMERCIAL

## 2018-08-22 DIAGNOSIS — E03.9 HYPOTHYROIDISM, UNSPECIFIED TYPE: ICD-10-CM

## 2018-08-22 DIAGNOSIS — Z12.39 SCREENING BREAST EXAMINATION: ICD-10-CM

## 2018-08-22 LAB — TSH SERPL DL<=0.05 MIU/L-ACNC: 0.55 IU/ML (ref 0.34–5.6)

## 2018-08-22 PROCEDURE — 36415 COLL VENOUS BLD VENIPUNCTURE: CPT | Performed by: FAMILY MEDICINE

## 2018-08-22 PROCEDURE — 77067 SCR MAMMO BI INCL CAD: CPT

## 2018-08-22 PROCEDURE — 84443 ASSAY THYROID STIM HORMONE: CPT | Performed by: FAMILY MEDICINE

## 2019-01-09 ENCOUNTER — TRANSFERRED RECORDS (OUTPATIENT)
Dept: HEALTH INFORMATION MANAGEMENT | Facility: OTHER | Age: 64
End: 2019-01-09

## 2019-03-04 DIAGNOSIS — I15.9 SECONDARY HYPERTENSION: ICD-10-CM

## 2019-03-05 RX ORDER — LISINOPRIL 20 MG/1
TABLET ORAL
Qty: 90 TABLET | Refills: 3 | Status: SHIPPED | OUTPATIENT
Start: 2019-03-05 | End: 2020-02-27

## 2019-03-23 DIAGNOSIS — E03.9 HYPOTHYROIDISM, UNSPECIFIED TYPE: ICD-10-CM

## 2019-03-25 RX ORDER — LEVOTHYROXINE SODIUM 100 UG/1
TABLET ORAL
Qty: 90 TABLET | Refills: 0 | Status: SHIPPED | OUTPATIENT
Start: 2019-03-25 | End: 2019-04-23

## 2019-03-25 NOTE — TELEPHONE ENCOUNTER
Levothyroxine  LOV-05/03/2018  Last TSH-08/22/2018  Prescription refilled per RN Medication RefillPolicy.................... Elen Manrique .Jaren..................  3/25/2019   3:29 PM

## 2019-04-23 ENCOUNTER — OFFICE VISIT (OUTPATIENT)
Dept: FAMILY MEDICINE | Facility: OTHER | Age: 64
End: 2019-04-23
Attending: FAMILY MEDICINE
Payer: COMMERCIAL

## 2019-04-23 VITALS
DIASTOLIC BLOOD PRESSURE: 68 MMHG | HEIGHT: 64 IN | BODY MASS INDEX: 39.37 KG/M2 | HEART RATE: 64 BPM | WEIGHT: 230.6 LBS | TEMPERATURE: 98.5 F | RESPIRATION RATE: 16 BRPM | SYSTOLIC BLOOD PRESSURE: 122 MMHG

## 2019-04-23 DIAGNOSIS — M72.2 PLANTAR FASCIITIS: ICD-10-CM

## 2019-04-23 DIAGNOSIS — J45.20 MILD INTERMITTENT EXTRINSIC ASTHMA WITHOUT COMPLICATION: ICD-10-CM

## 2019-04-23 DIAGNOSIS — Z23 NEED FOR SHINGLES VACCINE: ICD-10-CM

## 2019-04-23 DIAGNOSIS — Z00.00 HEALTH CARE MAINTENANCE: ICD-10-CM

## 2019-04-23 DIAGNOSIS — E03.9 HYPOTHYROIDISM, UNSPECIFIED TYPE: Primary | ICD-10-CM

## 2019-04-23 DIAGNOSIS — M79.641 PAIN OF RIGHT HAND: ICD-10-CM

## 2019-04-23 DIAGNOSIS — Z13.0 SCREENING FOR DEFICIENCY ANEMIA: ICD-10-CM

## 2019-04-23 DIAGNOSIS — E78.00 HYPERCHOLESTEROLEMIA: ICD-10-CM

## 2019-04-23 DIAGNOSIS — G89.29 CHRONIC RIGHT SHOULDER PAIN: ICD-10-CM

## 2019-04-23 DIAGNOSIS — M25.511 CHRONIC RIGHT SHOULDER PAIN: ICD-10-CM

## 2019-04-23 LAB
ALBUMIN SERPL-MCNC: 4.2 G/DL (ref 3.5–5.7)
ALP SERPL-CCNC: 63 U/L (ref 34–104)
ALT SERPL W P-5'-P-CCNC: 18 U/L (ref 7–52)
ANION GAP SERPL CALCULATED.3IONS-SCNC: 7 MMOL/L (ref 3–14)
AST SERPL W P-5'-P-CCNC: 18 U/L (ref 13–39)
BASOPHILS # BLD AUTO: 0.1 10E9/L (ref 0–0.2)
BASOPHILS NFR BLD AUTO: 0.9 %
BILIRUB SERPL-MCNC: 0.5 MG/DL (ref 0.3–1)
BUN SERPL-MCNC: 14 MG/DL (ref 7–25)
CALCIUM SERPL-MCNC: 9.1 MG/DL (ref 8.6–10.3)
CHLORIDE SERPL-SCNC: 103 MMOL/L (ref 98–107)
CHOLEST SERPL-MCNC: 197 MG/DL
CO2 SERPL-SCNC: 29 MMOL/L (ref 21–31)
CREAT SERPL-MCNC: 0.64 MG/DL (ref 0.6–1.2)
DIFFERENTIAL METHOD BLD: ABNORMAL
EOSINOPHIL # BLD AUTO: 0.2 10E9/L (ref 0–0.7)
EOSINOPHIL NFR BLD AUTO: 2.7 %
ERYTHROCYTE [DISTWIDTH] IN BLOOD BY AUTOMATED COUNT: 17.4 % (ref 10–15)
GFR SERPL CREATININE-BSD FRML MDRD: >90 ML/MIN/{1.73_M2}
GLUCOSE SERPL-MCNC: 87 MG/DL (ref 70–105)
HCT VFR BLD AUTO: 42.4 % (ref 35–47)
HDLC SERPL-MCNC: 59 MG/DL (ref 23–92)
HGB BLD-MCNC: 13.2 G/DL (ref 11.7–15.7)
IMM GRANULOCYTES # BLD: 0 10E9/L (ref 0–0.4)
IMM GRANULOCYTES NFR BLD: 0.3 %
LDLC SERPL CALC-MCNC: 107 MG/DL
LYMPHOCYTES # BLD AUTO: 2.4 10E9/L (ref 0.8–5.3)
LYMPHOCYTES NFR BLD AUTO: 34.1 %
MCH RBC QN AUTO: 26.2 PG (ref 26.5–33)
MCHC RBC AUTO-ENTMCNC: 31.1 G/DL (ref 31.5–36.5)
MCV RBC AUTO: 84 FL (ref 78–100)
MONOCYTES # BLD AUTO: 0.4 10E9/L (ref 0–1.3)
MONOCYTES NFR BLD AUTO: 6.1 %
NEUTROPHILS # BLD AUTO: 3.9 10E9/L (ref 1.6–8.3)
NEUTROPHILS NFR BLD AUTO: 55.9 %
NONHDLC SERPL-MCNC: 138 MG/DL
PLATELET # BLD AUTO: 292 10E9/L (ref 150–450)
POTASSIUM SERPL-SCNC: 4.1 MMOL/L (ref 3.5–5.1)
PROT SERPL-MCNC: 6.9 G/DL (ref 6.4–8.9)
RBC # BLD AUTO: 5.03 10E12/L (ref 3.8–5.2)
SODIUM SERPL-SCNC: 139 MMOL/L (ref 134–144)
TRIGL SERPL-MCNC: 156 MG/DL
TSH SERPL DL<=0.05 MIU/L-ACNC: 4.75 IU/ML (ref 0.34–5.6)
WBC # BLD AUTO: 7 10E9/L (ref 4–11)

## 2019-04-23 PROCEDURE — 36415 COLL VENOUS BLD VENIPUNCTURE: CPT | Mod: ZL | Performed by: FAMILY MEDICINE

## 2019-04-23 PROCEDURE — 80061 LIPID PANEL: CPT | Mod: ZL | Performed by: FAMILY MEDICINE

## 2019-04-23 PROCEDURE — 99214 OFFICE O/P EST MOD 30 MIN: CPT | Performed by: FAMILY MEDICINE

## 2019-04-23 PROCEDURE — 80053 COMPREHEN METABOLIC PANEL: CPT | Mod: ZL | Performed by: FAMILY MEDICINE

## 2019-04-23 PROCEDURE — 85025 COMPLETE CBC W/AUTO DIFF WBC: CPT | Mod: ZL | Performed by: FAMILY MEDICINE

## 2019-04-23 PROCEDURE — 84443 ASSAY THYROID STIM HORMONE: CPT | Mod: ZL | Performed by: FAMILY MEDICINE

## 2019-04-23 RX ORDER — ALBUTEROL SULFATE 90 UG/1
2 AEROSOL, METERED RESPIRATORY (INHALATION) EVERY 4 HOURS PRN
Qty: 8.5 G | Refills: 11 | Status: SHIPPED | OUTPATIENT
Start: 2019-04-23 | End: 2019-10-03

## 2019-04-23 RX ORDER — PRAVASTATIN SODIUM 40 MG
40 TABLET ORAL AT BEDTIME
Qty: 90 TABLET | Refills: 3 | Status: SHIPPED | OUTPATIENT
Start: 2019-04-23 | End: 2020-06-16

## 2019-04-23 RX ORDER — LEVOTHYROXINE SODIUM 100 UG/1
TABLET ORAL
Qty: 90 TABLET | Refills: 3 | Status: SHIPPED | OUTPATIENT
Start: 2019-04-23 | End: 2020-06-18

## 2019-04-23 ASSESSMENT — MIFFLIN-ST. JEOR: SCORE: 1573.05

## 2019-04-23 ASSESSMENT — PAIN SCALES - GENERAL: PAINLEVEL: NO PAIN (0)

## 2019-04-23 NOTE — NURSING NOTE
"Chief Complaint   Patient presents with     Physical       Initial /68 (BP Location: Right arm, Patient Position: Sitting, Cuff Size: Adult Regular)   Pulse 64   Temp 98.5  F (36.9  C) (Tympanic)   Resp 16   Ht 1.613 m (5' 3.5\")   Wt 104.6 kg (230 lb 9.6 oz)   Breastfeeding? No   BMI 40.21 kg/m   Estimated body mass index is 40.21 kg/m  as calculated from the following:    Height as of this encounter: 1.613 m (5' 3.5\").    Weight as of this encounter: 104.6 kg (230 lb 9.6 oz).  Medication Reconciliation: Completed     Tonya Shields LPN  "

## 2019-04-23 NOTE — PROGRESS NOTES
"  SUBJECTIVE:   Nursing Notes:   Tonya Shields LPN  4/23/2019  8:36 AM  Sign at exiting of workspace  Chief Complaint   Patient presents with     Physical       Initial /68 (BP Location: Right arm, Patient Position: Sitting, Cuff Size: Adult Regular)   Pulse 64   Temp 98.5  F (36.9  C) (Tympanic)   Resp 16   Ht 1.613 m (5' 3.5\")   Wt 104.6 kg (230 lb 9.6 oz)   Breastfeeding? No   BMI 40.21 kg/m    Estimated body mass index is 40.21 kg/m  as calculated from the following:    Height as of this encounter: 1.613 m (5' 3.5\").    Weight as of this encounter: 104.6 kg (230 lb 9.6 oz).  Medication Reconciliation: Completed     Tonya Shields LPN    Laura Romero is a 64 year old female who presents to clinic today for a physical.    Has had right foot plantar fasciitis for a couple of months.  Has a pair of custom orthotics that she has had a long time.  Bough an over the counter pair.  Also tried a massage therapist.      Dad had rheumatoid arthritis.  She has had a lot of pain in the base of her thumbs.  No swelling of joints noted.  No stiffness.    Right shoulder has been sore.  Had an MRI about 10 years ago.  Today it doesn't hurt too bad.  Hurts to lay on it and wakes her from sleep.  Can still reach over head.  Had done a lot of shoveling this winter.  Better if exercising at [employer].  Usually goes three days a week.    HPI    I personally reviewed medications/allergies/history listed below:    Patient Active Problem List    Diagnosis Date Noted     Hypercholesterolemia 02/08/2018     Priority: Medium     Obesity 02/08/2018     Priority: Medium     Overview:   Obesity, BMI 41.53;   Has joined exercise program and working on dietary changes        Rosacea 02/08/2018     Priority: Medium     Asymptomatic varicose veins 02/08/2018     Priority: Medium     Overview:   preloader comment: selections available to preload differs from paper chart.    *Varicosities of the left leg.       ACP " (advance care planning) 05/12/2016     Priority: Medium     Advance Care Planning 5/12/2016: ACP Review of Chart / Resources Provided:  Reviewed chart for advance care plan.  Laura BEBETO Romero has been provided information and resources to begin or update their advance care plan.  Added by Siri Westbrook             S/P nasal septoplasty 03/27/2013     Priority: Medium     Rhinitis, allergic 03/27/2013     Priority: Medium     Perennial allergic rhinitis 03/27/2013     Priority: Medium     Nephrolithiasis 12/12/2012     Priority: Medium     Allergic rhinitis 02/03/2012     Priority: Medium     Pain in joint, lower leg 05/04/2011     Priority: Medium     Overview:   possible meniscal tear       HTN (hypertension) 10/04/2010     Priority: Medium     Hypothyroidism 10/04/2010     Priority: Medium     Past Medical History:   Diagnosis Date     Bacteremia 12/04,Negative coag testing     Calculus of kidney     age 19 and age 50     Chronic sinusitis     5/20/2011     Deviated nasal septum     8/3/2012     Deviated nasal septum     sever turbinate hypertophy and superior septal deviation to right with recurrent sinusitis     Gastro-esophageal reflux disease without esophagitis     8/31/2009     Personal history of other medical treatment (CODE)     rectocele with cervical prolapse     Phlebitis and thrombophlebitis     1/6/11, after varicose vein ablation     Phlebitis and thrombophlebitis of lower extremities, unspecified (CODE)     1/6/2011,Greater saphenous vein left lower extremity     Pneumonia     7/27/2012      Past Surgical History:   Procedure Laterality Date     COLONOSCOPY      10/13/08,which was normal.  Next colonoscopy due in 2018.     HYSTERECTOMY VAGINAL      04/27/05, anterior repair with posterior perineorrhaphy and vaginal vault suspension     LAPAROSCOPIC TUBAL LIGATION      No Comments Provided     OTHER SURGICAL HISTORY      ,LITHOTRIPSY, with stent placement for right ureteral stone     OTHER  SURGICAL HISTORY      05,[id number],REMOVE,ureteral Stent removal     SEPTOPLASTY      2012,with turbinate reduction;  Dr. England     TONSILLECTOMY      No Comments Provided     Family History   Problem Relation Age of Onset     Heart Disease Mother 49        Heart Disease,MI     Hypertension Mother         Hypertension     Arthritis Father         Arthritis,Rheumatoid arthritis     Heart Disease Father         Heart Disease, MI     Hypertension Father         Hypertension     Other - See Comments Maternal Grandmother          of blood clot at childbirth.     Hypertension Sister         Hypertension     Hypertension Brother         Hypertension     Other - See Comments Other         Did have a blood clot     Other - See Comments Daughter         GI Disease,Ulcerative colitis diagnosed 2009     Breast Cancer No family hx of         Cancer-breast     Social History     Tobacco Use     Smoking status: Never Smoker     Smokeless tobacco: Never Used   Substance Use Topics     Alcohol use: Yes     Alcohol/week: 0.0 oz     Comment: Alcoholic Drinks/day: infrequent social     Social History     Social History Narrative    Retired from  [employer]; now helping with grandchildren    Patient has never smoked.     Passive smoke exposure - no    Alcohol Use - yes    Drug Use - no    HIV/High Risk - no    Regular Exercise - yes     - Justin     Current Outpatient Medications   Medication Sig Dispense Refill     albuterol (PROAIR HFA/PROVENTIL HFA/VENTOLIN HFA) 108 (90 Base) MCG/ACT inhaler Inhale 2 puffs into the lungs every 4 hours as needed 8.5 g 11     levothyroxine (SYNTHROID/LEVOTHROID) 100 MCG tablet TAKE 1 TABLET(100 MCG) BY MOUTH EVERY MORNING BEFORE BREAKFAST 90 tablet 3     other medical supplies Shingrix.  Diagnosis:  Z23. 1 each 1     pravastatin (PRAVACHOL) 40 MG tablet Take 1 tablet (40 mg) by mouth At Bedtime 90 tablet 3     albuterol (PROAIR HFA, PROVENTIL HFA, VENTOLIN HFA) 108 (90 BASE)  "MCG/ACT inhaler Inhale  into the lungs every 4 hours as needed. Inhale 1-2 puff       aspirin EC 81 MG EC tablet Take 81 mg by mouth daily with food       cetirizine (ZYRTEC) 10 MG tablet Take 10 mg by mouth daily       Cholecalciferol (VITAMIN D PO) Take 1 tablet by mouth daily        fluticasone (FLONASE) 50 MCG/ACT spray Spray 2 sprays into both nostrils daily 1 Bottle 3     Krill Oil 500 MG CAPS Take 1 capsule by mouth daily       lisinopril (PRINIVIL/ZESTRIL) 20 MG tablet TAKE 1 TABLET(20 MG) BY MOUTH DAILY 90 tablet 3     metroNIDAZOLE (METROGEL) 0.75 % topical gel Apply topically 2 times daily 45 g 3     Multiple Vitamin (MULTIVITAMIN) per tablet Take 1 tablet by mouth daily. Food       Allergies   Allergen Reactions     Azithromycin Hives     Zithromax         Review of Systems   Constitutional: Negative for fatigue and fever.   Respiratory: Negative for cough.    Musculoskeletal: Positive for arthralgias. Negative for joint swelling and myalgias.        OBJECTIVE:     /68 (BP Location: Right arm, Patient Position: Sitting, Cuff Size: Adult Regular)   Pulse 64   Temp 98.5  F (36.9  C) (Tympanic)   Resp 16   Ht 1.613 m (5' 3.5\")   Wt 104.6 kg (230 lb 9.6 oz)   Breastfeeding? No   BMI 40.21 kg/m    Body mass index is 40.21 kg/m .  Physical Exam   Constitutional: She is oriented to person, place, and time. She appears well-developed and well-nourished. No distress.   HENT:   Head: Normocephalic.   Right Ear: Tympanic membrane and external ear normal.   Left Ear: Tympanic membrane and external ear normal.   Nose: Nose normal.   Mouth/Throat: Oropharynx is clear and moist. No oropharyngeal exudate.   Eyes: Pupils are equal, round, and reactive to light. Conjunctivae are normal. Right eye exhibits no discharge. Left eye exhibits no discharge.   Neck: Neck supple. No tracheal deviation present. No thyromegaly present.   Cardiovascular: Normal rate, regular rhythm, S1 normal, S2 normal, normal heart " sounds, intact distal pulses and normal pulses. Exam reveals no gallop, no S3, no S4 and no friction rub.   No murmur heard.  Pulmonary/Chest: Effort normal and breath sounds normal. No respiratory distress. She has no wheezes. She has no rales. No breast tenderness or discharge.   Breast exam:  No masses palpable.  No skin changes, tethering or axillary lymphadenopathy.   Abdominal: Soft. Bowel sounds are normal. She exhibits no distension and no mass. There is no hepatosplenomegaly. There is no tenderness.   Genitourinary:   Genitourinary Comments: Pelvic exam:  No masses palpable.  Uterus surgically absent.  Rectal exam:  Normal sphincter tone, no masses palpable.   Musculoskeletal: Normal range of motion. She exhibits no edema.   Right shoulder:  Able to abduct fully to 180 degrees.  Neer's, Hawkin's and Empty Can tests are all negative.  Normal range of motion with internal and external rotation of shoulder.   Lymphadenopathy:     She has no cervical adenopathy.   Neurological: She is alert and oriented to person, place, and time. She has normal strength and normal reflexes. She exhibits normal muscle tone.   Skin: Skin is warm and dry. No rash noted.   Psychiatric: She has a normal mood and affect. Judgment and thought content normal. Cognition and memory are normal.         PHQ-9 SCORE 7/30/2015 8/4/2016   PHQ-9 Total Score 0 0       PHQ-2 Score:     PHQ-2 ( 1999 Pfizer) 4/23/2019 5/3/2018   Q1: Little interest or pleasure in doing things 0 0   Q2: Feeling down, depressed or hopeless 0 0   PHQ-2 Score 0 0       No flowsheet data found.      No flowsheet data found.      I personally reviewed results withpatient as listed below:   Diagnostic Test Results:  none     ASSESSMENT/PLAN:       ICD-10-CM    1. Hypothyroidism, unspecified type E03.9 Thyrotropin GH     levothyroxine (SYNTHROID/LEVOTHROID) 100 MCG tablet     Thyrotropin GH   2. Hypercholesterolemia E78.00 Comprehensive Metabolic Panel     Lipid Panel      pravastatin (PRAVACHOL) 40 MG tablet     Lipid Panel     Comprehensive Metabolic Panel   3. Plantar fasciitis M72.2 ORTHOTICS REFERRAL   4. Chronic right shoulder pain M25.511 CANCELED: XR Shoulder Right G/E 3 Views    G89.29    5. Pain of right hand M79.641 CANCELED: XR Hand Right G/E 3 Views   6. Mild intermittent extrinsic asthma without complication J45.20 albuterol (PROAIR HFA/PROVENTIL HFA/VENTOLIN HFA) 108 (90 Base) MCG/ACT inhaler   7. Need for shingles vaccine Z23 other medical supplies   8. Screening for deficiency anemia Z13.0 CBC and Differential     CBC and Differential   9. Health care maintenance Z00.00        1.  Recheck TSH today.  Levothyroxine refilled.  2.  Labs as above.  Pravastatin refilled.  3.  Referred for new orthotics to see if this will help speed healing of her plantar fasciitis.  If it does not help, consider referral to podiatry.  4.  May be related to tendinitis.  Offered x-ray, which she declined for now.  She is good to try to take ibuprofen or Aleve on a scheduled basis for a week or to see if this will help.  Could try physical therapy or steroid injection as well.  5.  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  At this time she has no red flag symptoms for rheumatoid arthritis.  Offered lab work to screen for this, which she declines.  6.  Stable.  Albuterol refilled.  7.  Prescription given to receive shingles vaccine at outside pharmacy.  8.  CBC as above.  9.  Mammogram last completed in August was normal.  She prefers to wait on DEXA scan until she is on Medicare.  She also wishes to wait for her next colonoscopy until she is on Medicare.  Her last completed colonoscopy was in October 2008.  She is status post hysterectomy and therefore Pap no longer indicated.  Last Tdap was in 2017.    Gloria Mcnally MD  [facility] AND [facility]

## 2019-04-24 PROBLEM — J45.20 MILD INTERMITTENT EXTRINSIC ASTHMA WITHOUT COMPLICATION: Status: ACTIVE | Noted: 2019-04-24

## 2019-04-24 ASSESSMENT — ENCOUNTER SYMPTOMS
JOINT SWELLING: 0
MYALGIAS: 0
FEVER: 0
COUGH: 0
ARTHRALGIAS: 1
FATIGUE: 0

## 2019-04-25 ENCOUNTER — OFFICE VISIT (OUTPATIENT)
Dept: FAMILY MEDICINE | Facility: OTHER | Age: 64
End: 2019-04-25
Attending: CHIROPRACTOR
Payer: COMMERCIAL

## 2019-04-25 VITALS
WEIGHT: 230 LBS | HEART RATE: 64 BPM | BODY MASS INDEX: 39.27 KG/M2 | RESPIRATION RATE: 16 BRPM | DIASTOLIC BLOOD PRESSURE: 68 MMHG | SYSTOLIC BLOOD PRESSURE: 122 MMHG | TEMPERATURE: 98.5 F | HEIGHT: 64 IN

## 2019-04-25 DIAGNOSIS — M72.2 PLANTAR FASCIITIS: Primary | ICD-10-CM

## 2019-04-25 PROCEDURE — 99207 ZZC NO CHARGE LOS: CPT | Performed by: CHIROPRACTOR

## 2019-04-25 ASSESSMENT — MIFFLIN-ST. JEOR: SCORE: 1570.33

## 2019-04-25 ASSESSMENT — PAIN SCALES - GENERAL: PAINLEVEL: MILD PAIN (3)

## 2019-04-25 NOTE — PROGRESS NOTES
CHIEF COMPLAINT: Laura Romero is a 64 year old  female  Chief Complaint   Patient presents with     Orthotics     orthotics     Patient presents for evaluation of right sided foot pain and plantars fasciitis.  Onset is approximately 2 months ago.  Evaluation was already performed by her primary, Dr. Mcnally.  She is here for possible orthotics    ORTHOTIC EXAM:     Right Foot:     Excessive Pronation: Yes    Non-Weight Bearing to Weight Bearing difference: 8 mm   Excessive Supination: Negative    Non-Weight Bearing to Weight Bearing difference: N/A   Bunion: Yes, moderate   Pes Planus: Mild   Pes Cavus: Negative   Heel Pain: Yes   Hammer Toe: Yes, second digit   Mallet Toe: Negative   Hallux Rigidus: Negative   Hallux Varus: Negative   Skin: Negative   Sensation: Negative   Edema: Negative     Left Foot:     Excessive Pronation: Negative    Non-Weight Bearing to Weight Bearing difference: N/A   Excessive Supination: Negative    Non-Weight Bearing to Weight Bearing difference: N/A   Bunion: Negative   Pes Planus: Negative   Pes Cavus: Negative   Heel Pain: Negative   Hammer Toe: Negative   Mallet Toe: Negative   Hallux Rigidus: Negative   Hallux Varus: Negative   Skin: Negative   Sensation: Negative    Edema: Negative     IMPRESSION/PLAN:    Patient is a candidate for corrective orthotics to address excessive pronation of the right foot which is causing plantar fasciitis.    There is no charge for this examination due to prior evaluation with her PCP.        Juancarlos Stevens DC  Director - Occupational Medicine Department  [address] [address]  Phone [phone]  Fax [phone]    Disclaimer:  This note consists of words and symbols derived from keyboarding, dictation, or using voice recognition software. As a result, there may be errors in the script that have gone undetected. Please consider this when interpreting information found in this  note.    3:15 PM 4/25/2019

## 2019-04-25 NOTE — NURSING NOTE
Patient presenting for orthotics today. She is referred by Gloria Mcnally MD. Patient has had orthotics in the past.  Ritika Das LPN 4/25/2019 1:01 PM

## 2019-05-20 ENCOUNTER — MYC MEDICAL ADVICE (OUTPATIENT)
Dept: FAMILY MEDICINE | Facility: OTHER | Age: 64
End: 2019-05-20

## 2019-05-20 DIAGNOSIS — M25.511 CHRONIC RIGHT SHOULDER PAIN: Primary | ICD-10-CM

## 2019-05-20 DIAGNOSIS — G89.29 CHRONIC RIGHT SHOULDER PAIN: Primary | ICD-10-CM

## 2019-05-22 ENCOUNTER — HOSPITAL ENCOUNTER (OUTPATIENT)
Dept: GENERAL RADIOLOGY | Facility: OTHER | Age: 64
Discharge: HOME | End: 2019-05-22
Attending: FAMILY MEDICINE | Admitting: FAMILY MEDICINE
Payer: COMMERCIAL

## 2019-05-22 DIAGNOSIS — G89.29 CHRONIC RIGHT SHOULDER PAIN: ICD-10-CM

## 2019-05-22 DIAGNOSIS — M25.511 CHRONIC RIGHT SHOULDER PAIN: ICD-10-CM

## 2019-05-22 PROCEDURE — 73030 X-RAY EXAM OF SHOULDER: CPT | Mod: RT

## 2019-05-28 ENCOUNTER — HOSPITAL ENCOUNTER (OUTPATIENT)
Dept: PHYSICAL THERAPY | Facility: OTHER | Age: 64
Setting detail: THERAPIES SERIES
End: 2019-05-28
Attending: FAMILY MEDICINE
Payer: COMMERCIAL

## 2019-05-28 DIAGNOSIS — G89.29 CHRONIC RIGHT SHOULDER PAIN: ICD-10-CM

## 2019-05-28 DIAGNOSIS — M25.511 CHRONIC RIGHT SHOULDER PAIN: ICD-10-CM

## 2019-05-28 PROCEDURE — 97161 PT EVAL LOW COMPLEX 20 MIN: CPT | Mod: GP

## 2019-05-28 PROCEDURE — 97110 THERAPEUTIC EXERCISES: CPT | Mod: GP

## 2019-05-28 NOTE — PROGRESS NOTES
05/28/19 1100   General Information   Type of Visit Initial OP Ortho PT Evaluation   Start of Care Date 05/28/19   Referring Physician Gloria Mcnally MD   Patient/Family Goals Statement To decrease my shoulder pain so I can sleep on my right side, use my right arm for cleaning, yardwork.   Orders Evaluate and Treat   Date of Order 05/20/19   Certification Required? No   Medical Diagnosis Chronic R shoulder pain   Surgical/Medical history reviewed Yes       Present No   Body Part(s)   Body Part(s) Shoulder   Presentation and Etiology   Pertinent history of current problem (include personal factors and/or comorbidities that impact the POC) Patient is referred to PT due to chronic right shoulder pain. Notes that she had PT before for her shoulder, probably 5 years ago. Did very well with her HEP. Notes that she is swimming at [employer] several times per week. She did gradually taper off her exercise program. Notes that is has been good for years. States that in the past winter months, her shoulder flared up. She did a lot of shoveling and has recently been raking. Notes that she did a very deep spring housecleaning to get ready for a shower party at her home. Feels that all of this in combination has made her shoulder painful. . Notes that she had an xray--mild arthritis. Notes that she cannot sleep on that side.  Notes some days she can hardly lift her arm. Notes that she is typically worse by the end of the day but sometimes is more sore in the mornings.  Notes that she is using ice and regularly scheduled doses of ibuprofen. She is propping pillows at night for comfort.  She is left hand dominant. Medical hx reviewed with no significant comorbidities related to the shoulder.   Impairments A. Pain;B. Decreased WB tolerance;C. Swelling;E. Decreased flexibility;F. Decreased strength and endurance;K. Numbness   Functional Limitations perform activities of daily living;perform required  work activities;perform desired leisure / sports activities   Symptom Location Top of the neck and down into the arm above the elbow. whole hand goes numb at night at times   How/Where did it occur From insidious onset   Onset date of current episode/exacerbation 01/01/19   Chronicity Recurrent   Pain rating (0-10 point scale) Best (/10);Worst (/10)   Best (/10) 3   Worst (/10) 8   Pain quality B. Dull;C. Aching   Frequency of pain/symptoms A. Constant   Pain/symptoms are: Other   Pain symptoms comment not predictable   Pain/symptoms exacerbated by C. Lifting;D. Carrying;E. Rest;H. Overhead reach;K. Home tasks   Pain/symptoms eased by H. Cold;I. OTC medication(s)   Progression of symptoms since onset: Worsened   Current / Previous Interventions   Diagnostic Tests: X-ray   X-ray Results Results   X-ray results mild degenerative changes AC and GH joints   Prior Level of Function   Prior Level of Function-Mobility Shoulder issues in the past which resolved with PT. No problems with mobility/ADLS until this last winter.   Current Level of Function   Patient role/employment history F. Retired   Living environment [place]   Fall Risk Screen   Fall screen completed by PT   Have you fallen 2 or more times in the past year? No   Have you fallen and had an injury in the past year? No   Is patient a fall risk? No   Abuse Screen (yes response referral indicated)   Feels Unsafe at Home or Work/School no   Feels Threatened by Someone no   Does Anyone Try to Keep You From Having Contact with Others or Doing Things Outside Your Home? no   Physical Signs of Abuse Present no   Shoulder Objective Findings   Side (if bilateral, select both right and left) Right   Observation Uses R arm spontaneously below shoulder level. Occasionally supports right arm with left   Posture Forward head. R shoulder elevated and internally rotated. Mild thoracic kyphosis   Cervical Screen (ROM, quadrant) WFL   Thoracic Mobility Screen Decreased  upper and mid thoracic rotation   Shoulder ROM Comment mild pain at end ranges PROM   Scapulothoracic Rhythm decreased R   Pec Minor (supine) Flexibility decreased   Neer's Test positive   Smith-Braden Test positive   Shoulder Special Tests Comments Decreased clavicular depression with scaption, worse with IR on R   Palpation Tender over the supraspinatus and subscapularis tendons. Tender at the scapular notch, superior aspect of the AC joint, and over the coracoacromial ligaments.    Accessory Motion/Joint Mobility decreased AC mobility, decreased inferior spring of the coracoid. Passive scapular mobility is fairly good with mild decrease in upward rotation.   Right Shoulder Flexion AROM 155 degrees with painful arc   Right Shoulder Flexion PROM WNL   Right Shoulder Abduction AROM 155 degrees with minimal pain   Right Shoulder Abduction PROM WNL   Right Shoulder ER AROM to reach T1 vs T3 left   Right Shoulder ER PROM WNL   Right Shoulder IR AROM to reach L1 vs T12 left   Right Shoulder IR PROM WFL   Right Shoulder Flexion Strength 3+/5 pain   Right Shoulder Abduction Strength 3+/5 pain   Right Shoulder ER Strength 3+/5 pain   Right Shoulder IR Strength 5/5   Right Lower Trapezius Strength 3+/5   Planned Therapy Interventions   Planned Therapy Interventions joint mobilization;manual therapy;neuromuscular re-education;ROM;strengthening;stretching   Planned Modality Interventions   Planned Modality Interventions Cryotherapy;Electrical stimulation;Iontophoresis;Ultrasound   Planned Modality Interventions Comments as indicated   Clinical Impression   Criteria for Skilled Therapeutic Interventions Met yes, treatment indicated   PT Diagnosis Chronic R shoulder pain, impingement syndrome   Influenced by the following impairments limited AROM, limited flexibility, pain, weakness, postural dysfunction   Functional limitations due to impairments sleeping on R side, vacuuming, housework, yardwork (raking), reaching overhead    Clinical Presentation Stable/Uncomplicated   Clinical Presentation Rationale PSFS, subjective report, clinical findings   Clinical Decision Making (Complexity) Low complexity   Therapy Frequency other (see comments)  (1-2 times per week)   Predicted Duration of Therapy Intervention (days/wks) 3 months   Risk & Benefits of therapy have been explained Yes   Patient, Family & other staff in agreement with plan of care Yes   Clinical Impression Comments Pt presents with chronic R shoulder pain which has progressively become worse with use. Clinical findings consistent with impingement syndrome. PT indicated to address the above noted impairments and functional limitations.    Education Assessment   Preferred Learning Style Listening   Barriers to Learning No barriers   ORTHO GOALS   PT Ortho Eval Goals 1;2;3;4   Ortho Goal 1   Goal Identifier HEP   Goal Description Pt to display independence in HEP to assist with self management.   Target Date 08/28/19   Ortho Goal 2   Goal Identifier sleep   Goal Description Pt to report the ability to lie on the right side for 2-3 hours of sleep with minimal pain.   Target Date 08/28/19   Ortho Goal 3   Goal Identifier strength   Goal Description Pt to display at least grade 4/5 shoulder strength to allow her to perform housework tasks with minimal to no pain   Target Date 08/28/19   Ortho Goal 4   Goal Identifier ROM   Goal Description Pt to display full R shoulder AROM without pain to assist in reaching overhead   Target Date 07/14/19   Total Evaluation Time   PT Eval, Low Complexity Minutes (73158) 30   Kim Medina, PT on 5/28/2019 at 2:51 PM

## 2019-05-30 ENCOUNTER — HOSPITAL ENCOUNTER (OUTPATIENT)
Dept: PHYSICAL THERAPY | Facility: OTHER | Age: 64
Setting detail: THERAPIES SERIES
End: 2019-05-30
Attending: FAMILY MEDICINE
Payer: COMMERCIAL

## 2019-05-30 PROCEDURE — 97140 MANUAL THERAPY 1/> REGIONS: CPT | Mod: GP

## 2019-05-30 PROCEDURE — 97110 THERAPEUTIC EXERCISES: CPT | Mod: GP

## 2019-06-05 ENCOUNTER — HOSPITAL ENCOUNTER (OUTPATIENT)
Dept: PHYSICAL THERAPY | Facility: OTHER | Age: 64
Setting detail: THERAPIES SERIES
End: 2019-06-05
Attending: FAMILY MEDICINE
Payer: COMMERCIAL

## 2019-06-05 PROCEDURE — 97110 THERAPEUTIC EXERCISES: CPT | Mod: GP

## 2019-06-05 PROCEDURE — 97140 MANUAL THERAPY 1/> REGIONS: CPT | Mod: GP

## 2019-06-10 ENCOUNTER — HOSPITAL ENCOUNTER (OUTPATIENT)
Dept: PHYSICAL THERAPY | Facility: OTHER | Age: 64
Setting detail: THERAPIES SERIES
End: 2019-06-10
Attending: FAMILY MEDICINE
Payer: COMMERCIAL

## 2019-06-10 PROCEDURE — 97110 THERAPEUTIC EXERCISES: CPT | Mod: GP

## 2019-06-10 PROCEDURE — 97140 MANUAL THERAPY 1/> REGIONS: CPT | Mod: GP

## 2019-06-12 ENCOUNTER — HOSPITAL ENCOUNTER (OUTPATIENT)
Dept: PHYSICAL THERAPY | Facility: OTHER | Age: 64
Setting detail: THERAPIES SERIES
End: 2019-06-12
Attending: FAMILY MEDICINE
Payer: COMMERCIAL

## 2019-06-12 PROCEDURE — 97140 MANUAL THERAPY 1/> REGIONS: CPT | Mod: GP

## 2019-06-12 PROCEDURE — 97110 THERAPEUTIC EXERCISES: CPT | Mod: GP

## 2019-06-17 ENCOUNTER — HOSPITAL ENCOUNTER (OUTPATIENT)
Dept: PHYSICAL THERAPY | Facility: OTHER | Age: 64
Setting detail: THERAPIES SERIES
End: 2019-06-17
Attending: FAMILY MEDICINE
Payer: COMMERCIAL

## 2019-06-17 PROCEDURE — 97112 NEUROMUSCULAR REEDUCATION: CPT | Mod: GP

## 2019-06-17 PROCEDURE — 97140 MANUAL THERAPY 1/> REGIONS: CPT | Mod: GP

## 2019-06-17 PROCEDURE — 97110 THERAPEUTIC EXERCISES: CPT | Mod: GP

## 2019-06-19 ENCOUNTER — HOSPITAL ENCOUNTER (OUTPATIENT)
Dept: PHYSICAL THERAPY | Facility: OTHER | Age: 64
Setting detail: THERAPIES SERIES
End: 2019-06-19
Attending: FAMILY MEDICINE
Payer: COMMERCIAL

## 2019-06-19 PROCEDURE — 97140 MANUAL THERAPY 1/> REGIONS: CPT | Mod: GP

## 2019-06-19 PROCEDURE — 97110 THERAPEUTIC EXERCISES: CPT | Mod: GP

## 2019-06-27 ENCOUNTER — HOSPITAL ENCOUNTER (OUTPATIENT)
Dept: PHYSICAL THERAPY | Facility: OTHER | Age: 64
Setting detail: THERAPIES SERIES
End: 2019-06-27
Attending: FAMILY MEDICINE
Payer: COMMERCIAL

## 2019-06-27 PROCEDURE — 97110 THERAPEUTIC EXERCISES: CPT | Mod: GP

## 2019-06-27 PROCEDURE — 97140 MANUAL THERAPY 1/> REGIONS: CPT | Mod: GP

## 2019-07-02 ENCOUNTER — HOSPITAL ENCOUNTER (OUTPATIENT)
Dept: PHYSICAL THERAPY | Facility: OTHER | Age: 64
Setting detail: THERAPIES SERIES
End: 2019-07-02
Attending: FAMILY MEDICINE
Payer: COMMERCIAL

## 2019-07-02 PROCEDURE — 97140 MANUAL THERAPY 1/> REGIONS: CPT | Mod: GP

## 2019-07-02 PROCEDURE — 97110 THERAPEUTIC EXERCISES: CPT | Mod: GP

## 2019-07-15 ENCOUNTER — HOSPITAL ENCOUNTER (OUTPATIENT)
Dept: PHYSICAL THERAPY | Facility: OTHER | Age: 64
Setting detail: THERAPIES SERIES
End: 2019-07-15
Attending: FAMILY MEDICINE
Payer: COMMERCIAL

## 2019-07-15 PROCEDURE — 97140 MANUAL THERAPY 1/> REGIONS: CPT | Mod: GP

## 2019-07-15 PROCEDURE — 97110 THERAPEUTIC EXERCISES: CPT | Mod: GP

## 2019-07-15 NOTE — PROGRESS NOTES
Outpatient Physical Therapy Progress Note     Patient: Laura Romero  : 1955    Beginning/End Dates of Reporting Period:  19 to 7/15/2019    Referring Provider: Gloria Mcnally MD    Therapy Diagnosis: Chronic R shoulder pain, impingement syndrome     Client Self Report: Pt has been seen for a total of 10 sessions to date in PT due to right shoulder pain. She is frustrated because she is still having intermittent days where her shoulder is so painful that she can hardly lift it. Notes that she went on a girls weekend this past weekend and in between sleeping on a bad mattress, not icing and not having time to do her exercises, she really flared up. Notes that her shoulder still seems to be worse with humid or rainy weather. Notes that ice is helpful. Notes that she is trying to do her exercises but this is limited some days by pain. Still is not able to lie on the right side for very long but it is better than when she started. Notes that vacuuming she now does with her left arm. Reaching overhead is still painful at the end of her motion today, somedays she is not able to do this. Pain varies from 1 to 8/10.    Objective Measurements:  Objective Measure: ROM  Details: Full PROM for IR and ER noted today with flexion to 150 wtih pain and abduction to 135 with pain.  Objective Measure: Joint mobility  Details: More stiffness with inferior spring of the coracoid, AC joint mobility  Objective Measure: Myofascial  Details: Minimal tightness in the pect minor and major. More tender over the supraspinatus, subscapularis insertions. Mild tightness along the medial scapular border. Tightness along the right ribcage, lateral trunk fascial line.   Objective Measure: segmental mobility  Details: decreased PA spring T34, T45.           Outcome Measures (most recent score):  PSFS improved from 38% ability to 48% ability    Goals:  Goal Identifier HEP   Goal Description Pt to display independence in HEP to assist  with self management.   Target Date 08/28/19   Date Met      Progress: in progress--not consistently able to perform strengthening due to pain     Goal Identifier sleep   Goal Description Pt to report the ability to lie on the right side for 2-3 hours of sleep with minimal pain.   Target Date 08/28/19   Date Met      Progress:improving     Goal Identifier strength   Goal Description Pt to display at least grade 4/5 shoulder strength to allow her to perform housework tasks with minimal to no pain   Target Date 08/28/19   Date Met      Progress: Has not yet been consistent in strengthening due to pain flares     Goal Identifier ROM   Goal Description Pt to display full R shoulder AROM without pain to assist in reaching overhead   Target Date 08/28/19   Date Met      Progress: ROM has been full but today more limited due to end range pain with flexion and abduction       Progress Toward Goals:   Progress this reporting period: Intermittent difficulty with more severe R  Shoulder pain which limits ROM/functional use. Appears related to weather changes, lying on right side. ROM overall improved. Still not consistently tolerating strengthening.     Plan:  Continue therapy per current plan of care.    Discharge:  Naomy Medina PT on 7/15/2019 at 10:48 AM

## 2019-07-18 ENCOUNTER — MYC MEDICAL ADVICE (OUTPATIENT)
Dept: FAMILY MEDICINE | Facility: OTHER | Age: 64
End: 2019-07-18

## 2019-07-18 DIAGNOSIS — G89.29 CHRONIC RIGHT SHOULDER PAIN: Primary | ICD-10-CM

## 2019-07-18 DIAGNOSIS — M25.511 CHRONIC RIGHT SHOULDER PAIN: Primary | ICD-10-CM

## 2019-07-22 ENCOUNTER — HOSPITAL ENCOUNTER (OUTPATIENT)
Dept: PHYSICAL THERAPY | Facility: OTHER | Age: 64
Setting detail: THERAPIES SERIES
End: 2019-07-22
Attending: FAMILY MEDICINE
Payer: COMMERCIAL

## 2019-07-22 PROCEDURE — 97140 MANUAL THERAPY 1/> REGIONS: CPT | Mod: GP

## 2019-07-22 PROCEDURE — 97110 THERAPEUTIC EXERCISES: CPT | Mod: GP

## 2019-07-23 ENCOUNTER — OFFICE VISIT (OUTPATIENT)
Dept: FAMILY MEDICINE | Facility: OTHER | Age: 64
End: 2019-07-23
Attending: FAMILY MEDICINE
Payer: COMMERCIAL

## 2019-07-23 VITALS
OXYGEN SATURATION: 99 % | RESPIRATION RATE: 16 BRPM | SYSTOLIC BLOOD PRESSURE: 106 MMHG | WEIGHT: 229.8 LBS | BODY MASS INDEX: 40.07 KG/M2 | HEART RATE: 72 BPM | TEMPERATURE: 97.6 F | DIASTOLIC BLOOD PRESSURE: 76 MMHG

## 2019-07-23 DIAGNOSIS — J30.2 SEASONAL ALLERGIC RHINITIS, UNSPECIFIED TRIGGER: Primary | ICD-10-CM

## 2019-07-23 DIAGNOSIS — M25.511 CHRONIC RIGHT SHOULDER PAIN: ICD-10-CM

## 2019-07-23 DIAGNOSIS — G89.29 CHRONIC RIGHT SHOULDER PAIN: ICD-10-CM

## 2019-07-23 PROCEDURE — 99213 OFFICE O/P EST LOW 20 MIN: CPT | Performed by: FAMILY MEDICINE

## 2019-07-23 RX ORDER — PREDNISONE 20 MG/1
TABLET ORAL
Qty: 18 TABLET | Refills: 0 | Status: SHIPPED | OUTPATIENT
Start: 2019-07-23 | End: 2019-09-10

## 2019-07-23 ASSESSMENT — PAIN SCALES - GENERAL: PAINLEVEL: MILD PAIN (3)

## 2019-07-23 NOTE — PROGRESS NOTES
"Nursing Notes:   Fidelina Castellano LPN  7/23/2019  9:19 AM  Signed  Chief Complaint   Patient presents with     Dizziness     for 5-6 days       Initial /76   Pulse 72   Temp 97.6  F (36.4  C) (Temporal)   Resp 16   Wt 104.2 kg (229 lb 12.8 oz)   SpO2 99%   Breastfeeding? No   BMI 40.07 kg/m    Estimated body mass index is 40.07 kg/m  as calculated from the following:    Height as of 4/25/19: 1.613 m (5' 3.5\").    Weight as of this encounter: 104.2 kg (229 lb 12.8 oz).  Medication Reconciliation: complete    Fidelina Castellano LPN     She started out 5-6 days ago when she rolled onto her left side in bed she was instantly dizzy. It happened the next morning also. She has felt dizzy at times nauseated. Her throat has been a little sore and she has had sinus pain. She started taking Zyrtec D and flonase  for 4 days .It has not helped .  Fidelina Castellano LPN..................7/23/2019   9:18 AM      SUBJECTIVE:  Laura Romero  is a 64 year old female who comes in today because of 5 or 6 days of nausea and dizziness. She has sinus pressure and congestion. She takes daily Zyrtec and changed to Zyrtec-D 4 days ago.  She had vertigo a couple of days ago. She has a history of seasonal allergies and did allergy shots 6 years ago for 2 years. She doesn't feel sick. She has had no fever. She had nausea all day. She has no facial pain but has some headache.     She is had some ongoing issues with her right shoulder with impingement.  Has been doing physical therapy.  X-ray showed some mild degenerative change.  She had MRI of her shoulder 10 years ago that showed some tendinopathy and a small undersurface tear of the supraspinatus in addition to os acromiale and a lateral spur with some mass-effect on the supraspinatus at the time.    Past Medical, Family, and Social History reviewed and updated as noted below.   ROS is negative except as noted above       Allergies   Allergen Reactions     Azithromycin Hives     " Zithromax     ,   Family History   Problem Relation Age of Onset     Heart Disease Mother 49        Heart Disease,MI     Hypertension Mother         Hypertension     Arthritis Father         Arthritis,Rheumatoid arthritis     Heart Disease Father         Heart Disease, MI     Hypertension Father         Hypertension     Other - See Comments Maternal Grandmother          of blood clot at childbirth.     Hypertension Sister         Hypertension     Hypertension Brother         Hypertension     Other - See Comments Other         Did have a blood clot     Other - See Comments Daughter         GI Disease,Ulcerative colitis diagnosed      Breast Cancer No family hx of         Cancer-breast   ,   Current Outpatient Medications   Medication     albuterol (PROAIR HFA/PROVENTIL HFA/VENTOLIN HFA) 108 (90 Base) MCG/ACT inhaler     aspirin EC 81 MG EC tablet     cetirizine (ZYRTEC) 10 MG tablet     Cholecalciferol (VITAMIN D PO)     fluticasone (FLONASE) 50 MCG/ACT spray     Krill Oil 500 MG CAPS     levothyroxine (SYNTHROID/LEVOTHROID) 100 MCG tablet     lisinopril (PRINIVIL/ZESTRIL) 20 MG tablet     metroNIDAZOLE (METROGEL) 0.75 % topical gel     Multiple Vitamin (MULTIVITAMIN) per tablet     other medical supplies     pravastatin (PRAVACHOL) 40 MG tablet     predniSONE (DELTASONE) 20 MG tablet     No current facility-administered medications for this visit.    ,   Past Medical History:   Diagnosis Date     Bacteremia      ,Negative coag testing     Calculus of kidney     age 19 and age 50     Chronic sinusitis     2011     Deviated nasal septum     8/3/2012     Deviated nasal septum     sever turbinate hypertophy and superior septal deviation to right with recurrent sinusitis     Gastro-esophageal reflux disease without esophagitis     2009     Personal history of other medical treatment (CODE)     rectocele with cervical prolapse     Phlebitis and thrombophlebitis     11, after varicose vein  ablation     Phlebitis and thrombophlebitis of lower extremities, unspecified (CODE)     1/6/2011,Greater saphenous vein left lower extremity     Pneumonia     7/27/2012   ,   Patient Active Problem List    Diagnosis Date Noted     Mild intermittent extrinsic asthma without complication 04/24/2019     Priority: Medium     Hypercholesterolemia 02/08/2018     Priority: Medium     Obesity 02/08/2018     Priority: Medium     Overview:   Obesity, BMI 41.53;   Has joined exercise program and working on dietary changes        Rosacea 02/08/2018     Priority: Medium     Asymptomatic varicose veins 02/08/2018     Priority: Medium     Overview:   preloader comment: selections available to preload differs from paper chart.    *Varicosities of the left leg.       ACP (advance care planning) 05/12/2016     Priority: Medium     Advance Care Planning 5/12/2016: ACP Review of Chart / Resources Provided:  Reviewed chart for advance care plan.  Laura Romero has been provided information and resources to begin or update their advance care plan.  Added by Siri Westbrook             S/P nasal septoplasty 03/27/2013     Priority: Medium     Rhinitis, allergic 03/27/2013     Priority: Medium     Perennial allergic rhinitis 03/27/2013     Priority: Medium     Nephrolithiasis 12/12/2012     Priority: Medium     Allergic rhinitis 02/03/2012     Priority: Medium     Pain in joint, lower leg 05/04/2011     Priority: Medium     Overview:   possible meniscal tear       HTN (hypertension) 10/04/2010     Priority: Medium     Hypothyroidism 10/04/2010     Priority: Medium   ,   Past Surgical History:   Procedure Laterality Date     COLONOSCOPY      10/13/08,which was normal.  Next colonoscopy due in 2018.     HYSTERECTOMY VAGINAL      04/27/05, anterior repair with posterior perineorrhaphy and vaginal vault suspension     LAPAROSCOPIC TUBAL LIGATION      No Comments Provided     OTHER SURGICAL HISTORY      ,LITHOTRIPSY, with stent placement  for right ureteral stone     OTHER SURGICAL HISTORY      8/11/05,125153,REMOVE,ureteral Stent removal     SEPTOPLASTY      8/2012,with turbinate reduction;  Dr. England     TONSILLECTOMY      No Comments Provided    and   Social History     Tobacco Use     Smoking status: Never Smoker     Smokeless tobacco: Never Used   Substance Use Topics     Alcohol use: Yes     Alcohol/week: 0.0 oz     Comment: Alcoholic Drinks/day: infrequent social     OBJECTIVE:  /76   Pulse 72   Temp 97.6  F (36.4  C) (Temporal)   Resp 16   Wt 104.2 kg (229 lb 12.8 oz)   SpO2 99%   Breastfeeding? No   BMI 40.07 kg/m     EXAM:  Alert and cooperative, no distress.  Nose is slightly congested.  Sinuses are nontender to palpation and transilluminate well.  TMs are clear on the right and slightly retracted on the left.  Throat is clear.  Neck is supple without significant adenopathy.  Lungs are clear, no rales rhonchi or wheezes are heard.  Cardiac RRR without murmur  ASSESSMENT/Plan :    Laura was seen today for dizziness.    Diagnoses and all orders for this visit:    Seasonal allergic rhinitis, unspecified trigger  -     predniSONE (DELTASONE) 20 MG tablet; 3 tablets daily for 3 days, 2 tablets daily for 3 days, 1 tablet daily for 3 days then stop.  Take with food.    Chronic right shoulder pain  -     predniSONE (DELTASONE) 20 MG tablet; 3 tablets daily for 3 days, 2 tablets daily for 3 days, 1 tablet daily for 3 days then stop.  Take with food.      At this point, I think she probably has a flare of her seasonal allergies with congestion.  No evidence of infection.  Recommend that she go back to plain Zyrtec without the decongestant and continue with Flonase.  Will burst with prednisone.  She will let us know if she is not improving.  This may also help her shoulder and if that gets worse after stopping the steroids, might consider subacromial space injection.    Antoni Borrero MD

## 2019-07-23 NOTE — NURSING NOTE
"Chief Complaint   Patient presents with     Dizziness     for 5-6 days       Initial /76   Pulse 72   Temp 97.6  F (36.4  C) (Temporal)   Resp 16   Wt 104.2 kg (229 lb 12.8 oz)   SpO2 99%   Breastfeeding? No   BMI 40.07 kg/m   Estimated body mass index is 40.07 kg/m  as calculated from the following:    Height as of 4/25/19: 1.613 m (5' 3.5\").    Weight as of this encounter: 104.2 kg (229 lb 12.8 oz).  Medication Reconciliation: complete    Fidelina Castellano LPN     She started out 5-6 days ago when she rolled onto her left side in bed she was instantly dizzy. It happened the next morning also. She has felt dizzy at times nauseated. Her throat has been a little sore and she has had sinus pain. She started taking Zyrtec D and flonase  for 4 days .It has not helped .  Fidelina Castellano LPN..................7/23/2019   9:18 AM    "

## 2019-07-26 ENCOUNTER — MYC MEDICAL ADVICE (OUTPATIENT)
Dept: FAMILY MEDICINE | Facility: OTHER | Age: 64
End: 2019-07-26

## 2019-07-31 ENCOUNTER — HOSPITAL ENCOUNTER (OUTPATIENT)
Dept: PHYSICAL THERAPY | Facility: OTHER | Age: 64
Setting detail: THERAPIES SERIES
End: 2019-07-31
Attending: FAMILY MEDICINE
Payer: COMMERCIAL

## 2019-07-31 PROCEDURE — 97140 MANUAL THERAPY 1/> REGIONS: CPT | Mod: GP

## 2019-07-31 PROCEDURE — 97110 THERAPEUTIC EXERCISES: CPT | Mod: GP

## 2019-08-07 ENCOUNTER — HOSPITAL ENCOUNTER (OUTPATIENT)
Dept: PHYSICAL THERAPY | Facility: OTHER | Age: 64
Setting detail: THERAPIES SERIES
End: 2019-08-07
Attending: FAMILY MEDICINE
Payer: COMMERCIAL

## 2019-08-07 PROCEDURE — 97140 MANUAL THERAPY 1/> REGIONS: CPT | Mod: GP

## 2019-08-07 PROCEDURE — 97110 THERAPEUTIC EXERCISES: CPT | Mod: GP

## 2019-08-14 ENCOUNTER — HOSPITAL ENCOUNTER (OUTPATIENT)
Dept: PHYSICAL THERAPY | Facility: OTHER | Age: 64
Setting detail: THERAPIES SERIES
End: 2019-08-14
Attending: FAMILY MEDICINE
Payer: COMMERCIAL

## 2019-08-14 PROCEDURE — 97140 MANUAL THERAPY 1/> REGIONS: CPT | Mod: GP

## 2019-08-14 PROCEDURE — 97110 THERAPEUTIC EXERCISES: CPT | Mod: GP

## 2019-08-21 ENCOUNTER — HOSPITAL ENCOUNTER (OUTPATIENT)
Dept: PHYSICAL THERAPY | Facility: OTHER | Age: 64
Setting detail: THERAPIES SERIES
End: 2019-08-21
Attending: FAMILY MEDICINE
Payer: COMMERCIAL

## 2019-08-21 PROCEDURE — 97140 MANUAL THERAPY 1/> REGIONS: CPT | Mod: GP

## 2019-08-21 PROCEDURE — 97110 THERAPEUTIC EXERCISES: CPT | Mod: GP

## 2019-09-09 ENCOUNTER — HOSPITAL ENCOUNTER (OUTPATIENT)
Dept: PHYSICAL THERAPY | Facility: OTHER | Age: 64
Setting detail: THERAPIES SERIES
End: 2019-09-09
Attending: FAMILY MEDICINE
Payer: COMMERCIAL

## 2019-09-09 PROCEDURE — 97110 THERAPEUTIC EXERCISES: CPT | Mod: GP

## 2019-09-09 PROCEDURE — 97140 MANUAL THERAPY 1/> REGIONS: CPT | Mod: GP

## 2019-09-10 ENCOUNTER — OFFICE VISIT (OUTPATIENT)
Dept: OTOLARYNGOLOGY | Facility: OTHER | Age: 64
End: 2019-09-10
Attending: PHYSICIAN ASSISTANT
Payer: COMMERCIAL

## 2019-09-10 VITALS
HEART RATE: 78 BPM | OXYGEN SATURATION: 97 % | BODY MASS INDEX: 39.09 KG/M2 | TEMPERATURE: 98 F | DIASTOLIC BLOOD PRESSURE: 72 MMHG | SYSTOLIC BLOOD PRESSURE: 112 MMHG | HEIGHT: 64 IN | WEIGHT: 229 LBS

## 2019-09-10 DIAGNOSIS — R42 LIGHTHEADEDNESS: ICD-10-CM

## 2019-09-10 DIAGNOSIS — J30.2 SEASONAL ALLERGIC RHINITIS, UNSPECIFIED TRIGGER: ICD-10-CM

## 2019-09-10 DIAGNOSIS — H93.8X3 SENSATION OF FULLNESS IN BOTH EARS: ICD-10-CM

## 2019-09-10 DIAGNOSIS — J30.89 PERENNIAL ALLERGIC RHINITIS: Primary | ICD-10-CM

## 2019-09-10 PROCEDURE — 99214 OFFICE O/P EST MOD 30 MIN: CPT | Performed by: PHYSICIAN ASSISTANT

## 2019-09-10 RX ORDER — BUDESONIDE 0.5 MG/2ML
0.5 INHALANT ORAL 2 TIMES DAILY
Qty: 2 BOX | Refills: 1 | Status: SHIPPED | OUTPATIENT
Start: 2019-09-10 | End: 2020-04-01

## 2019-09-10 ASSESSMENT — PAIN SCALES - GENERAL: PAINLEVEL: NO PAIN (0)

## 2019-09-10 ASSESSMENT — MIFFLIN-ST. JEOR: SCORE: 1565.8

## 2019-09-10 NOTE — PATIENT INSTRUCTIONS
Continue with Zyrtec and Flonase.   Hold Zyrtec for 5 days before allergy testing    Use Budesonide rinses. 1-2 times daily as needed for increase in sinus congestion/ pressure.   Instructions for rinse and listed below.     Complete hearing test.     Thank you for allowing Lorelei Guzman PA-C and our ENT team to participate in your care.  If your medications are too expensive, please give the nurse a call.  We can possibly change this medication.  If you have a scheduling or an appointment question please contact our Health Unit Coordinator at their direct line [phone].   ALL nursing questions or concerns can be directed to your ENT nurse at: [phone] [facility]    Budesonide nasal saline irrigation per instructions:  -Obtain [facility] [facility] Sinus rinse over the counter.    -Use warm distilled water and 2 packets of the salt solution that comes with the bottle, dissolve in bottle up to the 240 mL renata.  -Add 1 vial of budesonide.  -Irrigate each side of your nose leaning over the sink, using 1/3 to 1/2 the volume of the bottle in each nostril every irrigation.  Irrigate 2 times daily.  -If additional rinses are needed/recommended, you may use the plan [facility] [facility] Sinus irrigation without the use of added budesonide

## 2019-09-10 NOTE — NURSING NOTE
"Chief Complaint   Patient presents with     Consult     Allergies       Initial /72   Pulse 78   Temp 98  F (36.7  C) (Tympanic)   Ht 1.613 m (5' 3.5\")   Wt 103.9 kg (229 lb)   SpO2 97%   BMI 39.93 kg/m   Estimated body mass index is 39.93 kg/m  as calculated from the following:    Height as of this encounter: 1.613 m (5' 3.5\").    Weight as of this encounter: 103.9 kg (229 lb).  Medication Reconciliation: complete  "

## 2019-09-10 NOTE — LETTER
9/10/2019         RE: Laura Romero  [address]  [place] [address]        Dear Colleague,    Thank you for referring your patient, Laura Romero, to the [facility]. Please see a copy of my visit note below.    Otolaryngology Consultation    Patient: Laura Romero  : 1955    Chief Complaint   Patient presents with     Consult     Allergies       HPI:  Laura Romero is a 64 year old female seen today for allergy evaluation.  Symptoms have been present for a number of years, and are worsening despite use of antihistamines and decongestants.    Patient has been seen in our ENT department in . She was on SCIT prior.   At her last visit, she was on monthly injections on 2016.     Patient developed allergy flare, asthma flare.   She had developed an illness this summer. Increase in nasal congestion, sinus pressure, URI symptoms, coughing, and lightheadedness. She had been ill for about 1 month.   She was treated with Prednisone and symptoms improved.   Laura does have allergy symptoms intermittently and typically uses Zyrtec and Flonase. She has been using PRN seasonal needs. Symptoms are controlled with those medications.     She has mild nasal congestion, bilateral ear pain, pressure in her ears.   She has felt her hearing muffled at times. No mary clenching or grinding.   She did have vertigo in the mornings.   She only had vertigo in July and no further concerns since.     Denies COM or otologic surgeries.   Denies otalgia, otorrhea  Denies worrisome tinnitus  Denies fluctuating hearing loss or tinnitus.   Denies vertigo or facial paraesthesia.     Current Outpatient Rx   Medication Sig Dispense Refill     albuterol (PROAIR HFA/PROVENTIL HFA/VENTOLIN HFA) 108 (90 Base) MCG/ACT inhaler Inhale 2 puffs into the lungs every 4 hours as needed 8.5 g 11     aspirin EC 81 MG EC tablet Take 81 mg by mouth daily with food       cetirizine (ZYRTEC) 10 MG tablet Take 10  mg by mouth daily       Cholecalciferol (VITAMIN D PO) Take 1 tablet by mouth daily        fluticasone (FLONASE) 50 MCG/ACT spray Spray 2 sprays into both nostrils daily 1 Bottle 3     Krill Oil 500 MG CAPS Take 1 capsule by mouth daily       levothyroxine (SYNTHROID/LEVOTHROID) 100 MCG tablet TAKE 1 TABLET(100 MCG) BY MOUTH EVERY MORNING BEFORE BREAKFAST 90 tablet 3     lisinopril (PRINIVIL/ZESTRIL) 20 MG tablet TAKE 1 TABLET(20 MG) BY MOUTH DAILY 90 tablet 3     metroNIDAZOLE (METROGEL) 0.75 % topical gel Apply topically 2 times daily 45 g 3     Multiple Vitamin (MULTIVITAMIN) per tablet Take 1 tablet by mouth daily. Food       other medical supplies [employer].  Diagnosis:  Z23. 1 each 1     pravastatin (PRAVACHOL) 40 MG tablet Take 1 tablet (40 mg) by mouth At Bedtime 90 tablet 3     predniSONE (DELTASONE) 20 MG tablet 3 tablets daily for 3 days, 2 tablets daily for 3 days, 1 tablet daily for 3 days then stop.  Take with food. 18 tablet 0       Allergies: Azithromycin     Past Medical History:   Diagnosis Date     Bacteremia      12/04,Negative coag testing     Calculus of kidney     age 19 and age 50     Chronic sinusitis     5/20/2011     Deviated nasal septum     8/3/2012     Deviated nasal septum     sever turbinate hypertophy and superior septal deviation to right with recurrent sinusitis     Gastro-esophageal reflux disease without esophagitis     8/31/2009     Personal history of other medical treatment (CODE)     rectocele with cervical prolapse     Phlebitis and thrombophlebitis     1/6/11, after varicose vein ablation     Phlebitis and thrombophlebitis of lower extremities, unspecified (CODE)     1/6/2011,Greater saphenous vein left lower extremity     Pneumonia     7/27/2012       Past Surgical History:   Procedure Laterality Date     COLONOSCOPY      10/13/08,which was normal.  Next colonoscopy due in 2018.     HYSTERECTOMY VAGINAL      04/27/05, anterior repair with posterior perineorrhaphy and  "vaginal vault suspension     LAPAROSCOPIC TUBAL LIGATION      No Comments Provided     OTHER SURGICAL HISTORY      ,LITHOTRIPSY, with stent placement for right ureteral stone     OTHER SURGICAL HISTORY      8/11/05,[id number],REMOVE,ureteral Stent removal     SEPTOPLASTY      8/2012,with turbinate reduction;  Dr. England     TONSILLECTOMY      No Comments Provided       ENT family history reviewed    Social History     Tobacco Use     Smoking status: Never Smoker     Smokeless tobacco: Never Used   Substance Use Topics     Alcohol use: Yes     Alcohol/week: 0.0 oz     Comment: Alcoholic Drinks/day: infrequent social     Drug use: Never     Comment: Drug use: No       Review of Systems  ROS: 10 point ROS neg other than the symptoms noted above in the HPI     Physical Exam  /72   Pulse 78   Temp 98  F (36.7  C) (Tympanic)   Ht 1.613 m (5' 3.5\")   Wt 103.9 kg (229 lb)   SpO2 97%   BMI 39.93 kg/m     General - The patient is well nourished and well developed, and appears to have good nutritional status.  Alert and oriented to person and place, answers questions and cooperates with examination appropriately.   Head and Face - Normocephalic and atraumatic, with no gross asymmetry noted.  The facial nerve is intact, with strong symmetric movements.  Voice and Breathing - The patient was breathing comfortably without the use of accessory muscles. There was no wheezing, stridor, or stertor.  The patients voice was clear and strong, and had appropriate pitch and quality.  Ears -The external auditory canals are patent, the tympanic membranes are intact without effusion, retraction or mass.  Bony landmarks are intact.  Eyes - Extraocular movements intact, and the pupils were reactive to light.  Sclera were not icteric or injected, conjunctiva were pink and moist.  Mouth - Examination of the oral cavity showed pink, healthy oral mucosa. No lesions or ulcerations noted.  The tongue was mobile and midline, and the " dentition were in good condition.    Throat - The walls of the oropharynx were smooth, pink, moist, symmetric, and had no lesions or ulcerations.  The tonsillar pillars and soft palate were symmetric.  The uvula was midline on elevation.    Neck - Normal midline excursion of the laryngotracheal complex during swallowing.  Full range of motion on passive movement.  Palpation of the occipital, submental, submandibular, internal jugular chain, and supraclavicular nodes did not demonstrate any abnormal lymph nodes or masses.  Palpation of the thyroid was soft and smooth, with no nodules or goiter appreciated.  The trachea was mobile and midline.  Nose - External contour is symmetric, no gross deflection or scars.  Nasal mucosa is pink and moist with no abnormal mucus.  The septum was intact and the turbinates are enlarged.  No polyps, masses, or purulence noted on examination.  Heart- Regular rate  Lungs- Clear A/P.     ASSESSMENT:    ICD-10-CM    1. Perennial allergic rhinitis J30.89 budesonide (PULMICORT) 0.5 MG/2ML neb solution   2. Seasonal allergic rhinitis, unspecified trigger J30.2 budesonide (PULMICORT) 0.5 MG/2ML neb solution   3. Sensation of fullness in both ears H93.8X3    4. Lightheadedness R42      Continue with Zyrtec and Flonase.   Hold Zyrtec for 5 days before allergy testing  Consider use of Astelin or addition of Singulair.   Use Budesonide rinses. 1-2 times daily as needed for increase in sinus congestion/ pressure.     Complete audiogram. If changes noted consider further imaging. However, she did have normal ear exam today.     Use nasal saline as discussed today. Over the counter [employer] med saline irrigation is recommended.  Try to use hypertonic saline which is 2 packages in 1 rodrick med bottle per instructions on bottle.  Use this at least 2 times a day, blow your nose gently, then apply any other nasal medication that may have been prescribed today (nasal steroids or nasal antihistamines).  Take  your antihistamine daily (cetirizine, loratadine, fexofenadine, or similar) or twice daily if recommended.    Allergen avoidance measures were discussed and are important in preventing symptoms from occurring or worsening.    Indications for allergy testing include:   1) Confirm suspicion of allergic rhinitis due to inhalant allergies  2) Identify the offending allergen to determine specific mode of treatment  3) In the case of chronic rhinosinusitis: when symptoms are not controlled by avoidance and pharmacotherapy  4) In the Asthma patient when exacerbations may be due to perennial allergen exposure  5) Suspect food allergy  6) Otitis Media, chronic rhinitis, atopic dermatitis, Meniere disease, headache, pharyngitis or eye symptoms    Due to the findings above,  modified quantitative testing (MQT) will be performed.  Signed consent was obtained, and the risks of immunotherapy were discussed, including the potential for anaphylaxis.    If immunotherapy (IT) is recommended, there is continued risk of anaphylaxis.   Anaphylaxis can cause death. The patient will need to be monitored for 30 minutes post injection.  They must present their epinephrine pen prior to injection.  Subcutaneous as well as sublingual immunotherapy (SLIT) were discussed as potential treatment options.  The patient was told SLIT is not approved by the FDA and is cash pay.  The general time frame of immunotherapy was discussed (generally 3-5 years, sometimes longer), and the basic immunology behind IT was discussed.            Lorelei Guzman PA-C  [facility]  [phone]        Again, thank you for allowing me to participate in the care of your patient.        Sincerely,        Lorelei Guzman PA-C

## 2019-09-10 NOTE — PROGRESS NOTES
Outpatient Physical Therapy Progress Note     Patient: Laura Romero  : 1955    Beginning/End Dates of Reporting Period:  7-15-19 to 9/10/2019    Referring Provider: Gloria Mcnally MD    Therapy Diagnosis: Chronic R shoulder pain, impingement syndrome     Client Self Report: Pt has been seen a total of 6 times in PT to date. Notes that her shoulder has been sore for about the last week. Before that was pretty good with much less pain and better motion. Continues to note difficulty with rainy/humid weather and repetitive tasks.  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 Frustrated that it keeps flaring up periodically. Wondering about continued use of ice and ibuprofen.  Recommended continued ice but would need to talk to her MD about the ibuprofen use.    Objective Measurements:  Objective Measure: ROM  Details: Decreased PROM noted with pain at end ranges:  flexion 150, abduction  145, IR 55,  ER 85. Active flexion to 140 with pain. Abduction to 135 with pain. Functional ER to reach T1  Objective Measure: Joint mobility  Details: Increased tightness with  inferior spring of the coracoid. AC joint mobility limited with decreased clavicular depression with all positions of elevation. Relatively good scapular mobility. Increased tenderness joint testing.  Objective Measure: Myofascial  Details: Minimal tightness in the pect minor and major. Non tender over the supraspinatus, subscapularis insertions. Tightness along the right ribcage, lateral trunk fascial line.   Objective Measure: strength  Details: Limited by pain with 3+ to 4-/5 noted.    Goals:  Goal Identifier HEP   Goal Description Pt to display independence in HEP to assist with self management.   Target Date 19   Date Met      Progress: good compliance--daily. Will continue to update HEP.     Goal Identifier sleep   Goal Description Pt to report the ability to lie on the right side for 2-3 hours of sleep  with minimal pain.   Target Date 11/28/19   Date Met      Progress: in progress     Goal Identifier strength   Goal Description Pt to display at least grade 4/5 shoulder strength to allow her to perform housework tasks with minimal to no pain   Target Date 11/28/19   Date Met      Progress: in progress--limited by pain     Goal Identifier ROM   Goal Description Pt to display full R shoulder AROM without pain to assist in reaching overhead   Target Date 11/28/19   Date Met      Progress: variable depending on pain level       Progress Toward Goals:   Progress this reporting period: pain level is variable: on good days has full ROM. Strength limited by pain. Good compliance with HEP          Plan:  Changes to therapy plan of care: Continue PT on a 1-2 time per week basis X 3 additional months with manual therapy, N-M re-ed, exercise, HEP, ice. Taper frequency as appropriate. Pt to contact MD campbell:  ibuprofen use. Consider scheduling her MRI    Discharge:  Naomy Medina, PT on 9/10/2019 at 7:56 AM

## 2019-09-10 NOTE — PROGRESS NOTES
Otolaryngology Consultation    Patient: Laura Romero  : 1955    Chief Complaint   Patient presents with     Consult     Allergies       HPI:  Laura Romero is a 64 year old female seen today for allergy evaluation.  Symptoms have been present for a number of years, and are worsening despite use of antihistamines and decongestants.    Patient has been seen in our ENT department in . She was on SCIT prior.   At her last visit, she was on monthly injections on 2016.     Patient developed allergy flare, asthma flare.   She had developed an illness this summer. Increase in nasal congestion, sinus pressure, URI symptoms, coughing, and lightheadedness. She had been ill for about 1 month.   She was treated with Prednisone and symptoms improved.   Laura does have allergy symptoms intermittently and typically uses Zyrtec and Flonase. She has been using PRN seasonal needs. Symptoms are controlled with those medications.     She has mild nasal congestion, bilateral ear pain, pressure in her ears.   She has felt her hearing muffled at times. No mary clenching or grinding.   She did have vertigo in the mornings.   She only had vertigo in July and no further concerns since.     Denies COM or otologic surgeries.   Denies otalgia, otorrhea  Denies worrisome tinnitus  Denies fluctuating hearing loss or tinnitus.   Denies vertigo or facial paraesthesia.     Current Outpatient Rx   Medication Sig Dispense Refill     albuterol (PROAIR HFA/PROVENTIL HFA/VENTOLIN HFA) 108 (90 Base) MCG/ACT inhaler Inhale 2 puffs into the lungs every 4 hours as needed 8.5 g 11     aspirin EC 81 MG EC tablet Take 81 mg by mouth daily with food       cetirizine (ZYRTEC) 10 MG tablet Take 10 mg by mouth daily       Cholecalciferol (VITAMIN D PO) Take 1 tablet by mouth daily        fluticasone (FLONASE) 50 MCG/ACT spray Spray 2 sprays into both nostrils daily 1 Bottle 3     Krill Oil 500 MG CAPS Take 1 capsule by mouth daily        levothyroxine (SYNTHROID/LEVOTHROID) 100 MCG tablet TAKE 1 TABLET(100 MCG) BY MOUTH EVERY MORNING BEFORE BREAKFAST 90 tablet 3     lisinopril (PRINIVIL/ZESTRIL) 20 MG tablet TAKE 1 TABLET(20 MG) BY MOUTH DAILY 90 tablet 3     metroNIDAZOLE (METROGEL) 0.75 % topical gel Apply topically 2 times daily 45 g 3     Multiple Vitamin (MULTIVITAMIN) per tablet Take 1 tablet by mouth daily. Food       other medical supplies Shingrix.  Diagnosis:  Z23. 1 each 1     pravastatin (PRAVACHOL) 40 MG tablet Take 1 tablet (40 mg) by mouth At Bedtime 90 tablet 3     predniSONE (DELTASONE) 20 MG tablet 3 tablets daily for 3 days, 2 tablets daily for 3 days, 1 tablet daily for 3 days then stop.  Take with food. 18 tablet 0       Allergies: Azithromycin     Past Medical History:   Diagnosis Date     Bacteremia      12/04,Negative coag testing     Calculus of kidney     age 19 and age 50     Chronic sinusitis     5/20/2011     Deviated nasal septum     8/3/2012     Deviated nasal septum     sever turbinate hypertophy and superior septal deviation to right with recurrent sinusitis     Gastro-esophageal reflux disease without esophagitis     8/31/2009     Personal history of other medical treatment (CODE)     rectocele with cervical prolapse     Phlebitis and thrombophlebitis     1/6/11, after varicose vein ablation     Phlebitis and thrombophlebitis of lower extremities, unspecified (CODE)     1/6/2011,Greater saphenous vein left lower extremity     Pneumonia     7/27/2012       Past Surgical History:   Procedure Laterality Date     COLONOSCOPY      10/13/08,which was normal.  Next colonoscopy due in 2018.     HYSTERECTOMY VAGINAL      04/27/05, anterior repair with posterior perineorrhaphy and vaginal vault suspension     LAPAROSCOPIC TUBAL LIGATION      No Comments Provided     OTHER SURGICAL HISTORY      ,LITHOTRIPSY, with stent placement for right ureteral stone     OTHER SURGICAL HISTORY      8/11/05,860991,REMOVE,ureteral Stent  "removal     SEPTOPLASTY      8/2012,with turbinate reduction;  Dr. England     TONSILLECTOMY      No Comments Provided       ENT family history reviewed    Social History     Tobacco Use     Smoking status: Never Smoker     Smokeless tobacco: Never Used   Substance Use Topics     Alcohol use: Yes     Alcohol/week: 0.0 oz     Comment: Alcoholic Drinks/day: infrequent social     Drug use: Never     Comment: Drug use: No       Review of Systems  ROS: 10 point ROS neg other than the symptoms noted above in the HPI     Physical Exam  /72   Pulse 78   Temp 98  F (36.7  C) (Tympanic)   Ht 1.613 m (5' 3.5\")   Wt 103.9 kg (229 lb)   SpO2 97%   BMI 39.93 kg/m    General - The patient is well nourished and well developed, and appears to have good nutritional status.  Alert and oriented to person and place, answers questions and cooperates with examination appropriately.   Head and Face - Normocephalic and atraumatic, with no gross asymmetry noted.  The facial nerve is intact, with strong symmetric movements.  Voice and Breathing - The patient was breathing comfortably without the use of accessory muscles. There was no wheezing, stridor, or stertor.  The patients voice was clear and strong, and had appropriate pitch and quality.  Ears -The external auditory canals are patent, the tympanic membranes are intact without effusion, retraction or mass.  Bony landmarks are intact.  Eyes - Extraocular movements intact, and the pupils were reactive to light.  Sclera were not icteric or injected, conjunctiva were pink and moist.  Mouth - Examination of the oral cavity showed pink, healthy oral mucosa. No lesions or ulcerations noted.  The tongue was mobile and midline, and the dentition were in good condition.    Throat - The walls of the oropharynx were smooth, pink, moist, symmetric, and had no lesions or ulcerations.  The tonsillar pillars and soft palate were symmetric.  The uvula was midline on elevation.    Neck - " Normal midline excursion of the laryngotracheal complex during swallowing.  Full range of motion on passive movement.  Palpation of the occipital, submental, submandibular, internal jugular chain, and supraclavicular nodes did not demonstrate any abnormal lymph nodes or masses.  Palpation of the thyroid was soft and smooth, with no nodules or goiter appreciated.  The trachea was mobile and midline.  Nose - External contour is symmetric, no gross deflection or scars.  Nasal mucosa is pink and moist with no abnormal mucus.  The septum was intact and the turbinates are enlarged.  No polyps, masses, or purulence noted on examination.  Heart- Regular rate  Lungs- Clear A/P.     ASSESSMENT:    ICD-10-CM    1. Perennial allergic rhinitis J30.89 budesonide (PULMICORT) 0.5 MG/2ML neb solution   2. Seasonal allergic rhinitis, unspecified trigger J30.2 budesonide (PULMICORT) 0.5 MG/2ML neb solution   3. Sensation of fullness in both ears H93.8X3    4. Lightheadedness R42      Continue with Zyrtec and Flonase.   Hold Zyrtec for 5 days before allergy testing  Consider use of Astelin or addition of Singulair.   Use Budesonide rinses. 1-2 times daily as needed for increase in sinus congestion/ pressure.     Complete audiogram. If changes noted consider further imaging. However, she did have normal ear exam today.     Use nasal saline as discussed today. Over the counter Rodrick med saline irrigation is recommended.  Try to use hypertonic saline which is 2 packages in 1 rodrick med bottle per instructions on bottle.  Use this at least 2 times a day, blow your nose gently, then apply any other nasal medication that may have been prescribed today (nasal steroids or nasal antihistamines).  Take your antihistamine daily (cetirizine, loratadine, fexofenadine, or similar) or twice daily if recommended.    Allergen avoidance measures were discussed and are important in preventing symptoms from occurring or worsening.    Indications for allergy  testing include:   1) Confirm suspicion of allergic rhinitis due to inhalant allergies  2) Identify the offending allergen to determine specific mode of treatment  3) In the case of chronic rhinosinusitis: when symptoms are not controlled by avoidance and pharmacotherapy  4) In the Asthma patient when exacerbations may be due to perennial allergen exposure  5) Suspect food allergy  6) Otitis Media, chronic rhinitis, atopic dermatitis, Meniere disease, headache, pharyngitis or eye symptoms    Due to the findings above,  modified quantitative testing (MQT) will be performed.  Signed consent was obtained, and the risks of immunotherapy were discussed, including the potential for anaphylaxis.    If immunotherapy (IT) is recommended, there is continued risk of anaphylaxis.   Anaphylaxis can cause death. The patient will need to be monitored for 30 minutes post injection.  They must present their epinephrine pen prior to injection.  Subcutaneous as well as sublingual immunotherapy (SLIT) were discussed as potential treatment options.  The patient was told SLIT is not approved by the FDA and is cash pay.  The general time frame of immunotherapy was discussed (generally 3-5 years, sometimes longer), and the basic immunology behind IT was discussed.            Lorelei Guzman PA-C  ENT  [facility], [place]  [phone]

## 2019-09-17 ENCOUNTER — HOSPITAL ENCOUNTER (OUTPATIENT)
Dept: PHYSICAL THERAPY | Facility: OTHER | Age: 64
Setting detail: THERAPIES SERIES
End: 2019-09-17
Attending: FAMILY MEDICINE
Payer: COMMERCIAL

## 2019-09-17 PROCEDURE — 97140 MANUAL THERAPY 1/> REGIONS: CPT | Mod: GP

## 2019-09-17 PROCEDURE — 97110 THERAPEUTIC EXERCISES: CPT | Mod: GP

## 2019-09-26 ENCOUNTER — HOSPITAL ENCOUNTER (OUTPATIENT)
Dept: PHYSICAL THERAPY | Facility: OTHER | Age: 64
Setting detail: THERAPIES SERIES
End: 2019-09-26
Attending: FAMILY MEDICINE
Payer: COMMERCIAL

## 2019-09-26 PROCEDURE — 97110 THERAPEUTIC EXERCISES: CPT | Mod: GP

## 2019-09-26 PROCEDURE — 97140 MANUAL THERAPY 1/> REGIONS: CPT | Mod: GP

## 2019-09-30 ENCOUNTER — OFFICE VISIT (OUTPATIENT)
Dept: ALLERGY | Facility: OTHER | Age: 64
End: 2019-09-30
Attending: PHYSICIAN ASSISTANT
Payer: COMMERCIAL

## 2019-09-30 VITALS
DIASTOLIC BLOOD PRESSURE: 75 MMHG | WEIGHT: 235 LBS | OXYGEN SATURATION: 98 % | SYSTOLIC BLOOD PRESSURE: 125 MMHG | TEMPERATURE: 98 F | BODY MASS INDEX: 40.12 KG/M2 | HEIGHT: 64 IN | HEART RATE: 68 BPM

## 2019-09-30 DIAGNOSIS — J30.89 PERENNIAL ALLERGIC RHINITIS: Primary | ICD-10-CM

## 2019-09-30 PROCEDURE — 95004 PERQ TESTS W/ALRGNC XTRCS: CPT

## 2019-09-30 PROCEDURE — 95024 IQ TESTS W/ALLERGENIC XTRCS: CPT

## 2019-09-30 ASSESSMENT — MIFFLIN-ST. JEOR: SCORE: 1600.95

## 2019-09-30 ASSESSMENT — PAIN SCALES - GENERAL: PAINLEVEL: NO PAIN (0)

## 2019-09-30 NOTE — PROGRESS NOTES
MQT testing only today.  She did not see SIL Figueroa today.  She will follow-up with her on Thursday of this week.  Madeline Mcdonough RN on 9/30/2019 at 10:44 AM

## 2019-09-30 NOTE — NURSING NOTE
"Chief Complaint   Patient presents with     Other     MQT allergy skin test       Initial /75 (BP Location: Right arm, Patient Position: Sitting, Cuff Size: Adult Large)   Pulse 68   Temp 98  F (36.7  C) (Oral)   Ht 1.626 m (5' 4\")   Wt 106.6 kg (235 lb)   SpO2 98%   BMI 40.34 kg/m   Estimated body mass index is 40.34 kg/m  as calculated from the following:    Height as of this encounter: 1.626 m (5' 4\").    Weight as of this encounter: 106.6 kg (235 lb).  Medication Reconciliation: complete  Madeline Mcdonough RN      Prior to testing, the patient's identity is verified using name and date of birth.    Laura presents for repeat allergy skin testing.  She underwent immunotherapy here at [facility] after being tested in 2012. She also had a turbinate reduction by Dr. England prior to starting her IT.    Since late spring, she has become increasingly congested, with sinus headaches and dizziness.        All of the patients medications are reviewed prior to testing.  All appropriate medications have been stopped.         Consent is signed by the patient and signature is verified.    MQT/ID test is performed per protocol.  The patient tolerated testing well.  Benadryl cooling gel is applied to all test sites, and the 2 children's chewable Claritin are administered to the patient; both per standing orders for post test itching.    All findings are recorded on the paper flow sheet. Results are reviewed with the patient.  She is given written information regarding allergy.      The patient will follow-up with SIL Figueroa on Thursday of this week, for treatment plan.        Laura did not see SIL Figueroa today, these notes are for documentation of MAT only.  Madeline Mcdonough RN    "

## 2019-10-03 ENCOUNTER — OFFICE VISIT (OUTPATIENT)
Dept: OTOLARYNGOLOGY | Facility: OTHER | Age: 64
End: 2019-10-03
Attending: AUDIOLOGIST
Payer: COMMERCIAL

## 2019-10-03 ENCOUNTER — HOSPITAL ENCOUNTER (OUTPATIENT)
Dept: PHYSICAL THERAPY | Facility: OTHER | Age: 64
Setting detail: THERAPIES SERIES
End: 2019-10-03
Attending: FAMILY MEDICINE
Payer: COMMERCIAL

## 2019-10-03 VITALS
SYSTOLIC BLOOD PRESSURE: 110 MMHG | HEIGHT: 64 IN | DIASTOLIC BLOOD PRESSURE: 80 MMHG | HEART RATE: 84 BPM | WEIGHT: 235 LBS | OXYGEN SATURATION: 97 % | BODY MASS INDEX: 40.12 KG/M2 | TEMPERATURE: 97.3 F

## 2019-10-03 DIAGNOSIS — J30.89 PERENNIAL ALLERGIC RHINITIS: Primary | ICD-10-CM

## 2019-10-03 DIAGNOSIS — J30.2 SEASONAL ALLERGIC RHINITIS, UNSPECIFIED TRIGGER: ICD-10-CM

## 2019-10-03 PROCEDURE — 97110 THERAPEUTIC EXERCISES: CPT | Mod: GP

## 2019-10-03 PROCEDURE — 97140 MANUAL THERAPY 1/> REGIONS: CPT | Mod: GP

## 2019-10-03 PROCEDURE — 99213 OFFICE O/P EST LOW 20 MIN: CPT | Performed by: PHYSICIAN ASSISTANT

## 2019-10-03 RX ORDER — MONTELUKAST SODIUM 10 MG/1
10 TABLET ORAL AT BEDTIME
Qty: 30 TABLET | Refills: 11 | Status: SHIPPED | OUTPATIENT
Start: 2019-10-03 | End: 2020-10-21

## 2019-10-03 ASSESSMENT — PAIN SCALES - GENERAL: PAINLEVEL: NO PAIN (0)

## 2019-10-03 ASSESSMENT — MIFFLIN-ST. JEOR: SCORE: 1600.95

## 2019-10-03 NOTE — PROGRESS NOTES
Chief Complaint   Patient presents with     Follow Up     MQT follow up         Patient returns for MQT  Dilution #6-None  Dilution #5- Oak, Alternia, helmintherosoirum, dust.   Dilution #2- weeds, grass, trees, mold, cat, dog.       Laura Romero is a 64 year old female seen today for allergy evaluation.  Symptoms have been present for a number of years, and are worsening despite use of antihistamines and decongestants.    Patient has been seen in our ENT department in 2015. She was on SCIT prior.   At her last visit, she was on monthly injections on 5/2016.      Patient developed allergy flare, asthma flare.   She had developed an illness this summer. Increase in nasal congestion, sinus pressure, URI symptoms, coughing, and lightheadedness. She had been ill for about 1 month.   She was treated with Prednisone and symptoms improved.   Laura does have allergy symptoms intermittently and typically uses Zyrtec and Flonase. She has been using PRN seasonal needs. Symptoms are controlled with those medications.      She has mild nasal congestion, bilateral ear pain, pressure in her ears.   She has felt her hearing muffled at times. No mary clenching or grinding.   She did have vertigo in the mornings.   She only had vertigo in July and no further concerns since.        Past Medical History:   Diagnosis Date     Bacteremia 12/04,Negative coag testing     Calculus of kidney     age 19 and age 50     Chronic sinusitis     5/20/2011     Deviated nasal septum     8/3/2012     Deviated nasal septum     sever turbinate hypertophy and superior septal deviation to right with recurrent sinusitis     Gastro-esophageal reflux disease without esophagitis     8/31/2009     Personal history of other medical treatment (CODE)     rectocele with cervical prolapse     Phlebitis and thrombophlebitis     1/6/11, after varicose vein ablation     Phlebitis and thrombophlebitis of lower extremities, unspecified (CODE)      "1/6/2011,Greater saphenous vein left lower extremity     Pneumonia     7/27/2012        Allergies   Allergen Reactions     Azithromycin Hives     Zithromax       Current Outpatient Medications   Medication     albuterol (PROAIR HFA/PROVENTIL HFA/VENTOLIN HFA) 108 (90 Base) MCG/ACT inhaler     aspirin EC 81 MG EC tablet     budesonide (PULMICORT) 0.5 MG/2ML neb solution     cetirizine (ZYRTEC) 10 MG tablet     Cholecalciferol (VITAMIN D PO)     fluticasone (FLONASE) 50 MCG/ACT spray     Krill Oil 500 MG CAPS     levothyroxine (SYNTHROID/LEVOTHROID) 100 MCG tablet     lisinopril (PRINIVIL/ZESTRIL) 20 MG tablet     metroNIDAZOLE (METROGEL) 0.75 % topical gel     Multiple Vitamin (MULTIVITAMIN) per tablet     pravastatin (PRAVACHOL) 40 MG tablet     No current facility-administered medications for this visit.       ROS: 10 point ROS neg other than the symptoms noted above in the HPI.  /80   Pulse 84   Temp 97.3  F (36.3  C) (Tympanic)   Ht 1.626 m (5' 4\")   Wt 106.6 kg (235 lb)   SpO2 97%   BMI 40.34 kg/m    General - The patient is well nourished and well developed, and appears to have good nutritional status.  Alert and oriented to person and place, answers questions and cooperates with examination appropriately.   Head and Face - Normocephalic and atraumatic, with no gross asymmetry noted.  The facial nerve is intact, with strong symmetric movements.  Voice and Breathing - The patient was breathing comfortably without the use of accessory muscles. There was no wheezing, stridor, or stertor.  The patients voice was clear and strong, and had appropriate pitch and quality.  Ears -The external auditory canals are patent, the tympanic membranes are intact without effusion, retraction or mass.  Bony landmarks are intact.  Eyes - Extraocular movements intact, and the pupils were reactive to light.  Sclera were not icteric or injected, conjunctiva were pink and moist.  Mouth - Examination of the oral cavity " showed pink, healthy oral mucosa. No lesions or ulcerations noted.  The tongue was mobile and midline, and the dentition were in good condition.    Throat - The walls of the oropharynx were smooth, pink, moist, symmetric, and had no lesions or ulcerations.  The tonsillar pillars and soft palate were symmetric.  The uvula was midline on elevation.    Neck - Normal midline excursion of the laryngotracheal complex during swallowing.  Full range of motion on passive movement.  Palpation of the occipital, submental, submandibular, internal jugular chain, and supraclavicular nodes did not demonstrate any abnormal lymph nodes or masses.  Palpation of the thyroid was soft and smooth, with no nodules or goiter appreciated.  The trachea was mobile and midline.    ASSESSMENT:    ICD-10-CM    1. Perennial allergic rhinitis J30.89 montelukast (SINGULAIR) 10 MG tablet   2. Seasonal allergic rhinitis, unspecified trigger J30.2 montelukast (SINGULAIR) 10 MG tablet       Work on allergy avoidance.   Use Budesonide rinse 1-2 times daily or as needed for sinus headaches/ congestion.   Continue with Zyrtec  Start Singulair 10 mg at night  If no improvement, consider SCIT/ SLIT.  Follow up as needed for allergies/ congestion.         Lorelei Guzman PA-C  ENT  [facility], [place]  [phone]

## 2019-10-03 NOTE — LETTER
10/3/2019         RE: Laura Romero  [address]  [place] [address]        Dear Colleague,    Thank you for referring your patient, Laura Romero, to the [facility]. Please see a copy of my visit note below.    Chief Complaint   Patient presents with     Follow Up     MQT follow up         Patient returns for MQT  Dilution #6-None  Dilution #5- Oak, Alternia, helmintherosoirum, dust.   Dilution #2- weeds, grass, trees, mold, cat, dog.       Laura Romero is a 64 year old female seen today for allergy evaluation.  Symptoms have been present for a number of years, and are worsening despite use of antihistamines and decongestants.    Patient has been seen in our ENT department in 2015. She was on SCIT prior.   At her last visit, she was on monthly injections on 5/2016.      Patient developed allergy flare, asthma flare.   She had developed an illness this summer. Increase in nasal congestion, sinus pressure, URI symptoms, coughing, and lightheadedness. She had been ill for about 1 month.   She was treated with Prednisone and symptoms improved.   Laura does have allergy symptoms intermittently and typically uses Zyrtec and Flonase. She has been using PRN seasonal needs. Symptoms are controlled with those medications.      She has mild nasal congestion, bilateral ear pain, pressure in her ears.   She has felt her hearing muffled at times. No mary clenching or grinding.   She did have vertigo in the mornings.   She only had vertigo in July and no further concerns since.        Past Medical History:   Diagnosis Date     Bacteremia 12/04,Negative coag testing     Calculus of kidney     age 19 and age 50     Chronic sinusitis     5/20/2011     Deviated nasal septum     8/3/2012     Deviated nasal septum     sever turbinate hypertophy and superior septal deviation to right with recurrent sinusitis     Gastro-esophageal reflux disease without esophagitis     8/31/2009      "Personal history of other medical treatment (CODE)     rectocele with cervical prolapse     Phlebitis and thrombophlebitis     1/6/11, after varicose vein ablation     Phlebitis and thrombophlebitis of lower extremities, unspecified (CODE)     1/6/2011,Greater saphenous vein left lower extremity     Pneumonia     7/27/2012        Allergies   Allergen Reactions     Azithromycin Hives     Zithromax       Current Outpatient Medications   Medication     albuterol (PROAIR HFA/PROVENTIL HFA/VENTOLIN HFA) 108 (90 Base) MCG/ACT inhaler     aspirin EC 81 MG EC tablet     budesonide (PULMICORT) 0.5 MG/2ML neb solution     cetirizine (ZYRTEC) 10 MG tablet     Cholecalciferol (VITAMIN D PO)     fluticasone (FLONASE) 50 MCG/ACT spray     Krill Oil 500 MG CAPS     levothyroxine (SYNTHROID/LEVOTHROID) 100 MCG tablet     lisinopril (PRINIVIL/ZESTRIL) 20 MG tablet     metroNIDAZOLE (METROGEL) 0.75 % topical gel     Multiple Vitamin (MULTIVITAMIN) per tablet     pravastatin (PRAVACHOL) 40 MG tablet     No current facility-administered medications for this visit.       ROS: 10 point ROS neg other than the symptoms noted above in the HPI.  /80   Pulse 84   Temp 97.3  F (36.3  C) (Tympanic)   Ht 1.626 m (5' 4\")   Wt 106.6 kg (235 lb)   SpO2 97%   BMI 40.34 kg/m     General - The patient is well nourished and well developed, and appears to have good nutritional status.  Alert and oriented to person and place, answers questions and cooperates with examination appropriately.   Head and Face - Normocephalic and atraumatic, with no gross asymmetry noted.  The facial nerve is intact, with strong symmetric movements.  Voice and Breathing - The patient was breathing comfortably without the use of accessory muscles. There was no wheezing, stridor, or stertor.  The patients voice was clear and strong, and had appropriate pitch and quality.  Ears -The external auditory canals are patent, the tympanic membranes are intact without " effusion, retraction or mass.  Bony landmarks are intact.  Eyes - Extraocular movements intact, and the pupils were reactive to light.  Sclera were not icteric or injected, conjunctiva were pink and moist.  Mouth - Examination of the oral cavity showed pink, healthy oral mucosa. No lesions or ulcerations noted.  The tongue was mobile and midline, and the dentition were in good condition.    Throat - The walls of the oropharynx were smooth, pink, moist, symmetric, and had no lesions or ulcerations.  The tonsillar pillars and soft palate were symmetric.  The uvula was midline on elevation.    Neck - Normal midline excursion of the laryngotracheal complex during swallowing.  Full range of motion on passive movement.  Palpation of the occipital, submental, submandibular, internal jugular chain, and supraclavicular nodes did not demonstrate any abnormal lymph nodes or masses.  Palpation of the thyroid was soft and smooth, with no nodules or goiter appreciated.  The trachea was mobile and midline.    ASSESSMENT:    ICD-10-CM    1. Perennial allergic rhinitis J30.89 montelukast (SINGULAIR) 10 MG tablet   2. Seasonal allergic rhinitis, unspecified trigger J30.2 montelukast (SINGULAIR) 10 MG tablet       Work on allergy avoidance.   Use Budesonide rinse 1-2 times daily or as needed for sinus headaches/ congestion.   Continue with Zyrtec  Start Singulair 10 mg at night  If no improvement, consider SCIT/ SLIT.  Follow up as needed for allergies/ congestion.         Lorelei Gumzan PA-C  ENT  [facility], [place]  [phone]      Again, thank you for allowing me to participate in the care of your patient.        Sincerely,        Lorelei Guzman PA-C

## 2019-10-03 NOTE — PATIENT INSTRUCTIONS
Work on allergy avoidance.   Use Budesonide rinse 1-2 times daily or as needed for sinus headaches/ congestion.   Continue with Zyrtec  Start Singulair 10 mg at night    Follow up as needed for allergies/ congestion.     Thank you for allowing Lorelei Guzman PA-C and our ENT team to participate in your care.  If your medications are too expensive, please give the nurse a call.  We can possibly change this medication.  If you have a scheduling or an appointment question please contact our Health Unit Coordinator at their direct line [phone].   ALL nursing questions or concerns can be directed to your ENT nurse at: [phone] Siri

## 2019-10-03 NOTE — NURSING NOTE
"Chief Complaint   Patient presents with     Follow Up     MQT follow up       Initial /80   Pulse 84   Temp 97.3  F (36.3  C) (Tympanic)   Ht 1.626 m (5' 4\")   Wt 106.6 kg (235 lb)   SpO2 97%   BMI 40.34 kg/m   Estimated body mass index is 40.34 kg/m  as calculated from the following:    Height as of this encounter: 1.626 m (5' 4\").    Weight as of this encounter: 106.6 kg (235 lb).  Medication Reconciliation: complete  Lorena Kim LPN  "

## 2019-10-10 ENCOUNTER — HOSPITAL ENCOUNTER (OUTPATIENT)
Dept: PHYSICAL THERAPY | Facility: OTHER | Age: 64
Setting detail: THERAPIES SERIES
End: 2019-10-10
Attending: FAMILY MEDICINE
Payer: COMMERCIAL

## 2019-10-10 PROCEDURE — 97110 THERAPEUTIC EXERCISES: CPT | Mod: GP

## 2019-10-10 PROCEDURE — 97140 MANUAL THERAPY 1/> REGIONS: CPT | Mod: GP

## 2019-11-04 ENCOUNTER — HOSPITAL ENCOUNTER (OUTPATIENT)
Dept: MAMMOGRAPHY | Facility: OTHER | Age: 64
Discharge: HOME | End: 2019-11-04
Attending: FAMILY MEDICINE | Admitting: FAMILY MEDICINE
Payer: COMMERCIAL

## 2019-11-04 DIAGNOSIS — Z12.31 VISIT FOR SCREENING MAMMOGRAM: ICD-10-CM

## 2019-11-04 PROCEDURE — 77063 BREAST TOMOSYNTHESIS BI: CPT

## 2019-11-05 ENCOUNTER — HOSPITAL ENCOUNTER (OUTPATIENT)
Dept: PHYSICAL THERAPY | Facility: OTHER | Age: 64
Setting detail: THERAPIES SERIES
End: 2019-11-05
Attending: FAMILY MEDICINE
Payer: COMMERCIAL

## 2019-11-05 ENCOUNTER — MYC MEDICAL ADVICE (OUTPATIENT)
Dept: FAMILY MEDICINE | Facility: OTHER | Age: 64
End: 2019-11-05

## 2019-11-05 DIAGNOSIS — G89.29 CHRONIC RIGHT SHOULDER PAIN: Primary | ICD-10-CM

## 2019-11-05 DIAGNOSIS — M25.511 CHRONIC RIGHT SHOULDER PAIN: Primary | ICD-10-CM

## 2019-11-05 PROCEDURE — 97140 MANUAL THERAPY 1/> REGIONS: CPT | Mod: GP

## 2019-11-05 PROCEDURE — 97110 THERAPEUTIC EXERCISES: CPT | Mod: GP

## 2019-11-05 NOTE — PROGRESS NOTES
Outpatient Physical Therapy Progress Note     Patient: Laura Romero  : 1955    Beginning/End Dates of Reporting Period:  9-10-19 to 2019    Referring Provider: Gloria Barrera MD    Therapy Diagnosis:  Chronic R shoulder pain, impingement syndrome          Client Self Report: Pt was last seen one month ago, working on her home exercises since then. Notes that her left shoulder has been bad in the last week or so. Cannot sleep on her side at all. Notes she was up early this morning due to pain and iced which helped. Did not do anything to aggravate it. Has been limiting her exercise in her water class. Did not do any raking this fall. Frustrated that her progress is just so up and down.     Objective Measurements:  Objective Measure: ROM  Details: full flexion with pain at end range, decreased abduction by -10 with end range pain. Normal IR/ER but painful  Objective Measure: Joint mobility  Details: Tightness  with  inferior spring of the coracoid but not painful.  AC joint mobility decreased again at  end range elevation.   Objective Measure: Myofascial  Details: No significant guarding in the  pectoralis minor. Nontender over the supraspinatus, subscapularis. Good scapular mobility.         Goals:  Goal Identifier HEP   Goal Description Pt to display independence in HEP to assist with self management.   Target Date 19   Date Met      Progress:     Goal Identifier sleep   Goal Description Pt to report the ability to lie on the right side for 2-3 hours of sleep with minimal pain.   Target Date 19   Date Met   2019    Progress: compliance depends on pain level. Good judgement on pt's part re: exercise tolerance     Goal Identifier strength   Goal Description Pt to display at least grade 4/5 shoulder strength to allow her to perform housework tasks with minimal to no pain   Target Date 19   Date Met      Progress: limited by pain     Goal Identifier ROM   Goal Description Pt to  display full R shoulder AROM without pain to assist in reaching overhead   Target Date 11/28/19   Date Met      Progress: partially met--some days full ROM, others mildly limited due to pain       Progress Toward Goals:   Progress limited due to continued intermittent shoulder pain limiting exercise and activity tolerance. Not predictable.    Home Program:  Warm up with supine rotator cuff program X 5-10 reps each: elevation, lateral, circumduction, bench press, protraction, SL abd. SL ER 8 oz up to 3 lb, Codman's prn, IR red theraband, standing extension & adduction from 90 red theraband. Standing abduction to 90 4-8 oz, standing flexion to 90 4-8 oz. Ball on wall: scap stab. Finish with stretching: posterior shoulder stretch (hand on shoulder and hand to ceiling). Wall slide with overpressure, 90/90 pectoral stretch doorframe. Bent horizontal abd 1#, Bent extension 1#    Plan:  Other: No further treatment at this time. Recommended pt contact MD to proceed with MRI which was considered back in July. Will hold chart X 4 weeks, pending diagnostics and  in the event that further PT is indicated    Discharge:  Naomy Medina, PT on 11/5/2019 at 3:02 PM

## 2019-11-05 NOTE — TELEPHONE ENCOUNTER
Patient requesting MRI of right shoulder. Abbie Cardenas LPN ....................11/5/2019  3:38 PM

## 2019-11-13 ENCOUNTER — MYC MEDICAL ADVICE (OUTPATIENT)
Dept: OTOLARYNGOLOGY | Facility: OTHER | Age: 64
End: 2019-11-13

## 2019-11-18 ENCOUNTER — HOSPITAL ENCOUNTER (OUTPATIENT)
Dept: MRI IMAGING | Facility: OTHER | Age: 64
Discharge: HOME | End: 2019-11-18
Attending: FAMILY MEDICINE | Admitting: FAMILY MEDICINE
Payer: COMMERCIAL

## 2019-11-18 DIAGNOSIS — G89.29 CHRONIC RIGHT SHOULDER PAIN: ICD-10-CM

## 2019-11-18 DIAGNOSIS — M25.511 CHRONIC RIGHT SHOULDER PAIN: ICD-10-CM

## 2019-11-18 PROCEDURE — 73221 MRI JOINT UPR EXTREM W/O DYE: CPT | Mod: RT

## 2019-11-19 DIAGNOSIS — M77.8 RIGHT SHOULDER TENDONITIS: Primary | ICD-10-CM

## 2019-12-18 ENCOUNTER — OFFICE VISIT (OUTPATIENT)
Dept: FAMILY MEDICINE | Facility: OTHER | Age: 64
End: 2019-12-18
Attending: PHYSICIAN ASSISTANT
Payer: COMMERCIAL

## 2019-12-18 VITALS
BODY MASS INDEX: 40.96 KG/M2 | OXYGEN SATURATION: 98 % | HEART RATE: 79 BPM | WEIGHT: 239.9 LBS | HEIGHT: 64 IN | TEMPERATURE: 97.9 F | DIASTOLIC BLOOD PRESSURE: 68 MMHG | RESPIRATION RATE: 18 BRPM | SYSTOLIC BLOOD PRESSURE: 118 MMHG

## 2019-12-18 DIAGNOSIS — R39.89 URINARY PROBLEM: ICD-10-CM

## 2019-12-18 DIAGNOSIS — R39.89 SUSPECTED UTI: Primary | ICD-10-CM

## 2019-12-18 LAB
ALBUMIN UR-MCNC: NEGATIVE MG/DL
APPEARANCE UR: CLEAR
BACTERIA #/AREA URNS HPF: ABNORMAL /HPF
BILIRUB UR QL STRIP: NEGATIVE
CAOX CRY #/AREA URNS HPF: ABNORMAL /HPF
COLOR UR AUTO: YELLOW
GLUCOSE UR STRIP-MCNC: NEGATIVE MG/DL
HGB UR QL STRIP: ABNORMAL
KETONES UR STRIP-MCNC: NEGATIVE MG/DL
LEUKOCYTE ESTERASE UR QL STRIP: ABNORMAL
NITRATE UR QL: NEGATIVE
PH UR STRIP: 5.5 PH (ref 5–9)
RBC #/AREA URNS AUTO: ABNORMAL /HPF
SOURCE: ABNORMAL
SP GR UR STRIP: 1.01 (ref 1–1.03)
UROBILINOGEN UR STRIP-ACNC: 0.2 EU/DL (ref 0.2–1)
WBC #/AREA URNS AUTO: ABNORMAL /HPF

## 2019-12-18 PROCEDURE — 87086 URINE CULTURE/COLONY COUNT: CPT | Mod: ZL | Performed by: PHYSICIAN ASSISTANT

## 2019-12-18 PROCEDURE — 81001 URINALYSIS AUTO W/SCOPE: CPT | Mod: ZL | Performed by: PHYSICIAN ASSISTANT

## 2019-12-18 PROCEDURE — 99214 OFFICE O/P EST MOD 30 MIN: CPT | Performed by: PHYSICIAN ASSISTANT

## 2019-12-18 RX ORDER — CEFDINIR 300 MG/1
300 CAPSULE ORAL 2 TIMES DAILY
Qty: 14 CAPSULE | Refills: 0 | Status: SHIPPED | OUTPATIENT
Start: 2019-12-18 | End: 2020-01-07

## 2019-12-18 ASSESSMENT — PAIN SCALES - GENERAL: PAINLEVEL: MODERATE PAIN (4)

## 2019-12-18 ASSESSMENT — MIFFLIN-ST. JEOR: SCORE: 1623.18

## 2019-12-19 NOTE — PROGRESS NOTES
SUBJECTIVE: Laura Romero is a 64 year old female presents to clinic for evaluation of possible UTI  Onset 4 days ago, course is gradually worsening  Associated symptoms - low back pain, urine frequency, urgency, abdominal pressure, nausea    Treatments ibuporfen  History of UTI - infrequent infections, last one was a long time ago  Vaginal discomfort, discharge - none  History of kidney stone, kidney infection, kidney disease - she has had a few kidney stones, last one was 14 years ago  LMP - hysterctomy  BM - doesn't feel like she doesn't have a complete empty with BM, has been constipated this past week.     Past Medical History:   Diagnosis Date     Bacteremia      12/04,Negative coag testing     Calculus of kidney     age 19 and age 50     Chronic sinusitis     5/20/2011     Deviated nasal septum     8/3/2012     Deviated nasal septum     sever turbinate hypertophy and superior septal deviation to right with recurrent sinusitis     Gastro-esophageal reflux disease without esophagitis     8/31/2009     Personal history of other medical treatment (CODE)     rectocele with cervical prolapse     Phlebitis and thrombophlebitis     1/6/11, after varicose vein ablation     Phlebitis and thrombophlebitis of lower extremities, unspecified (CODE)     1/6/2011,Greater saphenous vein left lower extremity     Pneumonia     7/27/2012     Current Outpatient Medications   Medication     aspirin EC 81 MG EC tablet     cetirizine (ZYRTEC) 10 MG tablet     Cholecalciferol (VITAMIN D PO)     fluticasone (FLONASE) 50 MCG/ACT spray     Krill Oil 500 MG CAPS     levothyroxine (SYNTHROID/LEVOTHROID) 100 MCG tablet     lisinopril (PRINIVIL/ZESTRIL) 20 MG tablet     metroNIDAZOLE (METROGEL) 0.75 % topical gel     Multiple Vitamin (MULTIVITAMIN) per tablet     pravastatin (PRAVACHOL) 40 MG tablet     budesonide (PULMICORT) 0.5 MG/2ML neb solution     montelukast (SINGULAIR) 10 MG tablet     No current facility-administered medications  "for this visit.         Allergies   Allergen Reactions     Azithromycin Hives     Zithromax           ROS  General: feels well, no fever  Abdomen: mild discomfort  : POSITIVE - per HPI    OBJECTIVE:   Vitals:    12/18/19 1901   BP: 118/68   Pulse: 79   Resp: 18   Temp: 97.9  F (36.6  C)   TempSrc: Tympanic   SpO2: 98%   Weight: 108.8 kg (239 lb 14.4 oz)   Height: 1.626 m (5' 4\")     Appears well, in no apparent distress.  Vital signs are normal.  Cardiac: normal RR, no murmur  Respiratory: normal respiration, clear to ausculation  Abdomen: soft, TTP suprapubic,No rigidity, no rebound. No guarding. Mild CVAT left side    Results for orders placed or performed in visit on 12/18/19   Urinalysis w Reflex Microscopic If Positive     Status: Abnormal   Result Value Ref Range    Color Urine Yellow     Appearance Urine Clear     Glucose Urine Negative NEG^Negative mg/dL    Bilirubin Urine Negative NEG^Negative    Ketones Urine Negative NEG^Negative mg/dL    Specific Gravity Urine 1.015 1.000 - 1.030    Blood Urine Trace (A) NEG^Negative    pH Urine 5.5 5.0 - 9.0 pH    Protein Albumin Urine Negative NEG^Negative mg/dL    Urobilinogen Urine 0.2 0.2 - 1.0 EU/dL    Nitrite Urine Negative NEG^Negative    Leukocyte Esterase Urine Small (A) NEG^Negative    Source Midstream Urine    Urine Microscopic     Status: Abnormal   Result Value Ref Range    WBC Urine 0 - 5 OTO5^0 - 5 /HPF    RBC Urine O - 2 OTO2^O - 2 /HPF    Bacteria Urine Few (A) NEG^Negative /HPF    Calcium Oxalate Few (A) NEG^Negative /HPF         ASSESSMENT:   (R39.89) Suspected UTI  (primary encounter diagnosis)  Plan: cefdinir (OMNICEF) 300 MG capsule      (R39.89) Urinary problem  Plan: Urinalysis w Reflex Microscopic If Positive,         Urine Microscopic, Urine Culture Aerobic         Bacterial    Urinalysis is not definitive for UTI, urine culture is pending  History of kidney stones, she doesn's think this feels like a stone  Abdomen soft, TTP suprapubic and " mild CVAT left side  Will treat for suspected UTI  Start cefdinir 300 mg oral tablet, take this twice daily x 7 days  Push fluids  Ibuprofen or Tylenol as indicated for discomfort or fever  Return to clinic if symptoms persist or worsen  Patient received verbal and written instruction including review of warning signs    Jolynn Carrillo PA-C on 12/18/2019 at 10:02 PM

## 2019-12-19 NOTE — PATIENT INSTRUCTIONS
"Urinalysis is not definitive for UTI, urine culture is pending  History of kidney stones, she doesn's think this feels like a stone  Will treat for suspected UTI  Start cefdinir 300 mg oral tablet, take this twice daily x 7 days  Push fluids  Ibuprofen or Tylenol as indicated for discomfort or fever  Return to clinic if symptoms persist or worsen  Seek immediate care    Fever over 101 F (38.3 C)    No improvement by the third day of treatment    Increasing back or abdominal pain    Repeated vomiting; unable to keep medicine down    Weakness, dizziness or fainting    Vaginal discharge    Pain, redness or swelling in the labia (outer vaginal area)    Patient Education     Bladder Infection, Female (Adult)    Urine is normally doesn't have any bacteria in it. But bacteria can get into the urinary tract from the skin around the rectum. Or they can travel in the blood from elsewhere in the body. Once they are in your urinary tract, they can cause infection in the urethra (urethritis), the bladder (cystitis), or the kidneys (pyelonephritis).  The most common place for an infection is in the bladder. This is called a bladder infection. This is one of the most common infections in women. Most bladder infections are easily treated. They are not serious unless the infection spreads to the kidney.  The phrases \"bladder infection,\" \"UTI,\" and \"cystitis\" are often used to describe the same thing. But they are not always the same. Cystitis is an inflammation of the bladder. The most common cause of cystitis is an infection.  Symptoms  The infection causes inflammation in the urethra and bladder. This causes many of the symptoms. The most common symptoms of a bladder infection are:    Pain or burning when urinating    Having to urinate more often than usual    Urgent need to urinate    Only a small amount of urine comes out    Blood in urine    Abdominal discomfort. This is usually in the lower abdomen above the pubic " bone.    Cloudy urine    Strong- or bad-smelling urine    Unable to urinate (urinary retention)    Unable to hold urine in (urinary incontinence)    Fever    Loss of appetite    Confusion (in older adults)  Causes  Bladder infections are not contagious. You can't get one from someone else, from a toilet seat, or from sharing a bath.  The most common cause of bladder infections is bacteria from the bowels. The bacteria get onto the skin around the opening of the urethra. From there, they can get into the urine and travel up to the bladder, causing inflammation and infection. This usually happens because of:    Wiping improperly after urinating. Always wipe from front to back.    Bowel incontinence    Pregnancy    Procedures such as having a catheter inserted    Older age    Not emptying your bladder. This can allow bacteria a chance to grow in your urine.    Dehydration    Constipation    Sex    Use of a diaphragm for birth control   Treatment  Bladder infections are diagnosed by a urine test. They are treated with antibiotics and usually clear up quickly without complications. Treatment helps prevent a more serious kidney infection.  Medicines  Medicines can help in the treatment of a bladder infection:    Take antibiotics until they are used up, even if you feel better. It is important to finish them to make sure the infection has cleared.    You can use acetaminophen or ibuprofen for pain, fever, or discomfort, unless another medicine was prescribed. If you have chronic liver or kidney disease, talk with your healthcare provider before using these medicines. Also talk with your provider if you've ever had a stomach ulcer or gastrointestinal bleeding, or are taking blood-thinner medicines.    If you are given phenazopydridine to reduce burning with urination, it will cause your urine to become a bright orange color. This can stain clothing.  Care and prevention  These self-care steps can help prevent future  infections:    Drink plenty of fluids to prevent dehydration and flush out your bladder. Do this unless you must restrict fluids for other health reasons, or your doctor told you not to.    Proper cleaning after going to the bathroom is important. Wipe from front to back after using the toilet to prevent the spread of bacteria.    Urinate more often. Don't try to hold urine in for a long time.    Wear loose-fitting clothes and cotton underwear. Avoid tight-fitting pants.    Improve your diet and prevent constipation. Eat more fresh fruit and vegetables, and fiber, and less junk and fatty foods.    Avoid sex until your symptoms are gone.    Avoid caffeine, alcohol, and spicy foods. These can irritate your bladder.    Urinate right after intercourse to flush out your bladder.    If you use birth control pills and have frequent bladder infections, discuss it with your doctor.  Follow-up care  Call your healthcare provider if all symptoms are not gone after 3 days of treatment. This is especially important if you have repeat infections.  If a culture was done, you will be told if your treatment needs to be changed. If directed, you can call to find out the results.  If X-rays were done, you will be told if the results will affect your treatment.  Call 911  Call 911 if any of the following occur:    Trouble breathing    Hard to wake up or confusion    Fainting or loss of consciousness    Rapid heart rate  When to seek medical advice  Call your healthcare provider right away if any of these occur:    Fever of 100.4 F (38.0 C) or higher, or as directed by your healthcare provider    Symptoms are not better by the third day of treatment    Back or belly (abdominal) pain that gets worse    Repeated vomiting, or unable to keep medicine down    Weakness or dizziness    Vaginal discharge    Pain, redness, or swelling in the outer vaginal area (labia)  Date Last Reviewed: 10/1/2016    [id number] [employer] [address]  [place] [address]. All rights reserved. This information is not intended as a substitute for professional medical care. Always follow your healthcare professional's instructions.

## 2019-12-19 NOTE — NURSING NOTE
"Chief Complaint   Patient presents with     Urinary Problem   Patient is here for pain in the lower left side of her back, frequency, and bladder pressure that started yesterday.     Initial /68   Pulse 79   Temp 97.9  F (36.6  C) (Tympanic)   Resp 18   Ht 1.626 m (5' 4\")   Wt 108.8 kg (239 lb 14.4 oz)   SpO2 98%   BMI 41.18 kg/m   Estimated body mass index is 41.18 kg/m  as calculated from the following:    Height as of this encounter: 1.626 m (5' 4\").    Weight as of this encounter: 108.8 kg (239 lb 14.4 oz).  Medication Reconciliation: complete    Grace Anthony LPN  "

## 2019-12-21 LAB
BACTERIA SPEC CULT: NORMAL
SPECIMEN SOURCE: NORMAL

## 2019-12-23 ENCOUNTER — OFFICE VISIT (OUTPATIENT)
Dept: ORTHOPEDICS | Facility: OTHER | Age: 64
End: 2019-12-23
Attending: FAMILY MEDICINE
Payer: COMMERCIAL

## 2019-12-23 DIAGNOSIS — M77.8 RIGHT SHOULDER TENDONITIS: ICD-10-CM

## 2019-12-23 PROCEDURE — G0463 HOSPITAL OUTPT CLINIC VISIT: HCPCS

## 2020-01-02 NOTE — PROGRESS NOTES
"VITALS:   Height:   64  Weight:   230  Pulse rate:  68  Blood Pressure:  124 / 80      CHIEF COMPLAINT: Right Shoulder Pain    PROBLEMS:   Patient has no noted problems.    PATIENT REPORTED MEDICATIONS:  LEVOTHYROXINE SODIUM TABLET (LEVOTHYROXINE SODIUM TABS)   PRAVASTATIN SODIUM TABLET (PRAVASTATIN SODIUM TABS)   LISINOPRIL TABLET (LISINOPRIL TABS)     Medications were reviewed with patient this visit.    PATIENT REPORTED ALLERGIES:  ZITHROMAX (AZITHROMYCIN) (SevereReaction: No reaction details noted)    Allergies were reviewed during this visit.    RISK FACTORS:  Tobacco use:   never smoker  Passive smoke exposure: No  Alcohol Use:   Yes    HISTORY OF PRESENT ILLNESS:    Identification:  The patient is a 64-year-old female with right shoulder pain.    HPI:   Laura is a 64-year-old female with right shoulder pain that has been worsening over the last six months but it actually goes all the way back to last winter when she was doing quite a bit of shoveling.    The pain got better over the summer after an injection into the shoulder and then became markedly worse over the past couple of months.    Since she stopped physical therapy in approximately October, her shoulder has actually improved.    She still ended up getting an MRI on the 18th of November 2019 due to the pain that she was having, so she does have that for review today.    The patient says that she takes a lot of ibuprofen, it helps with the pain, and she probably takes \"too much of it\".    PMH:   Health history form dated 12/23/19 is reviewed and signed.  Past medical history, surgical history, social history, family history, medications, and review of systems is noted and scanned into the chart.     PAST MEDICAL HISTORY:    High Blood Pressure  Arthritis    PAST ORTHOPEDIC SURGICAL HISTORY:    No Past Orthopedic Surgical History    PAST SURGICAL HISTORY:    Hysterectomy  Tonsillectomy  Varicose Vein Surgery    FAMILY HISTORY:    Father:  Rheumatoid " Arthritis, Heart Problem  Mother:  Heart Problem  Brother: High Blood Pressure  Sister:  High Blood Pressure    SOCIAL HISTORY:   Marital Status:    Occupation:  Retired  Alcohol Use:  Yes  Tobacco Use:  Never  History HIV:  No  History Hepatitis:  No   Secondhand Smoke Exposure:  No    REVIEW OF SYSTEMS:  Joint or Muscle pain: Yes  Stiffness:  Yes  Swelling:  Yes  Difficulty in walking: No  Cold extremities: No  Weakness of muscles: Yes  Rash or Itching: No  Bruising:  No  Numbness/Tingling: Yes    PHYSICAL EXAMINATION:    General:  The patient is alert and oriented x3 and in no acute distress and cooperative during the physical exam with a normal mood and affect.    Skin:  Intact over the right shoulder; no erythema, warmth, rashes or bruising.    Cardiovascular:  Normal capillary refill of the right upper extremity with a palpable radial pulse.  No evidence of upper extremity DVT.    Neurologic:  Normal sensation and motor function in the median, radial and ulnar distributions.     Musculoskeletal:   The patient is a 64-year-old female in no acute distress.    She is mildly obese.   She is in no acute distress, very pleasant on examination.  The patient has full range of motion of bilateral shoulders all the way up to 170 , positive Neer impingement sign on the right, external rotation of 45 , internal rotation to L5.   This is difficult on the right side secondary to pain.  She has a negative liftoff but this does cause her significant amount of pain.   She is nontender to palpation at Codman's point and markedly tender to palpation at the acromioclavicular joint.    She is nontender to palpation at the bicipital groove.   Testing of the rotator cuff reveals give-way weakness secondary to pain with supraspinatus and infraspinatus and teres minor testing.   The patient is neuro and vascularly intact on the right upper extremity otherwise.  She does have a well-healed scar over the anterior of her shoulder  from some kind of a debridement that she had done previously.   She is really unsure of exactly what it is.    MRI:  Review of the MRI of the right shoulder shows significant acromioclavicular joint arthritis.  At the time of the MRI there was significant rotator cuff tendinitis as well as some mild calcific tendinitis appreciated at the greater tuberosity.   The biceps tendon shows a little of biceps tendinitis within the intraarticular course.   The labrum appears to be largely intact.   There is possibly some partial thickness rotator cuff tearing noted within the supraspinatus-infraspinatus junction.    ASSESSMENT:    This patient is a 64-year-old female who appears to have resolved most of her rotator cuff tendinitis at this point but still has significant pain in the acromioclavicular joint.    PROCEDURES:   Risks and benefits of the procedure were reviewed with the patient. Informed consent was obtained. Blood sugar risks for our diabetic patients were also discussed.    Risks, benefits and alternatives were reviewed with the patient.  After informed consent was obtained, under sterile technique after an alcohol prep 20 mg of Kenalog and 10 mg of lidocaine were injected in the right AC joint using a superior approach.  The procedure was tolerated well.     PLAN:   We have given her an acromioclavicular joint injection today and she tolerated this well and had good relief of her pain.  I am going to see her back on an as-needed basis for her right shoulder.    Dictated by Gwyn Hairston M.D.  D:  12/23/19  T:   12/30/19    Copy to:  Dali GONZALES, Gloria     Typed and/or reviewed and corrected by signing  below, and sent to the Physician for final review and signature.     This report was created using voice recording software and computer-generated templates. Although every effort has been made to review for and eliminate errors, some errors may still occur.

## 2020-01-07 ENCOUNTER — OFFICE VISIT (OUTPATIENT)
Dept: FAMILY MEDICINE | Facility: OTHER | Age: 65
End: 2020-01-07
Attending: FAMILY MEDICINE
Payer: COMMERCIAL

## 2020-01-07 VITALS
TEMPERATURE: 99.7 F | SYSTOLIC BLOOD PRESSURE: 120 MMHG | HEIGHT: 64 IN | DIASTOLIC BLOOD PRESSURE: 62 MMHG | RESPIRATION RATE: 16 BRPM | HEART RATE: 70 BPM | BODY MASS INDEX: 40.8 KG/M2 | WEIGHT: 239 LBS

## 2020-01-07 DIAGNOSIS — R35.0 URINARY FREQUENCY: Primary | ICD-10-CM

## 2020-01-07 DIAGNOSIS — M54.50 BILATERAL LOW BACK PAIN, UNSPECIFIED CHRONICITY, UNSPECIFIED WHETHER SCIATICA PRESENT: ICD-10-CM

## 2020-01-07 DIAGNOSIS — R10.2 PELVIC PAIN IN FEMALE: ICD-10-CM

## 2020-01-07 LAB
ALBUMIN UR-MCNC: NEGATIVE MG/DL
APPEARANCE UR: CLEAR
BACTERIA #/AREA URNS HPF: ABNORMAL /HPF
BILIRUB UR QL STRIP: NEGATIVE
COLOR UR AUTO: YELLOW
GLUCOSE UR STRIP-MCNC: NEGATIVE MG/DL
HGB UR QL STRIP: ABNORMAL
KETONES UR STRIP-MCNC: NEGATIVE MG/DL
LEUKOCYTE ESTERASE UR QL STRIP: ABNORMAL
NITRATE UR QL: NEGATIVE
NON-SQ EPI CELLS #/AREA URNS LPF: ABNORMAL /LPF
PH UR STRIP: 5 PH (ref 5–9)
RBC #/AREA URNS AUTO: ABNORMAL /HPF
SOURCE: ABNORMAL
SP GR UR STRIP: 1.02 (ref 1–1.03)
UROBILINOGEN UR STRIP-ACNC: 0.2 EU/DL (ref 0.2–1)
WBC #/AREA URNS AUTO: ABNORMAL /HPF

## 2020-01-07 PROCEDURE — G0463 HOSPITAL OUTPT CLINIC VISIT: HCPCS | Mod: 25 | Performed by: FAMILY MEDICINE

## 2020-01-07 PROCEDURE — 99214 OFFICE O/P EST MOD 30 MIN: CPT | Performed by: FAMILY MEDICINE

## 2020-01-07 PROCEDURE — 81001 URINALYSIS AUTO W/SCOPE: CPT | Mod: ZL | Performed by: FAMILY MEDICINE

## 2020-01-07 PROCEDURE — 51798 US URINE CAPACITY MEASURE: CPT | Performed by: FAMILY MEDICINE

## 2020-01-07 PROCEDURE — 87086 URINE CULTURE/COLONY COUNT: CPT | Mod: ZL | Performed by: FAMILY MEDICINE

## 2020-01-07 ASSESSMENT — PAIN SCALES - GENERAL: PAINLEVEL: MILD PAIN (3)

## 2020-01-07 ASSESSMENT — MIFFLIN-ST. JEOR: SCORE: 1619.1

## 2020-01-07 NOTE — PROGRESS NOTES
SUBJECTIVE:   Nursing Notes:   Ann-Marie Pineda LPN  1/7/2020 11:41 AM  Sign at exiting of workspace  Patient in clinic for bladder problem x 2 weeks, also patient feeling pressure in lower abdominal and pelvic area.  Pain accross lower back since mid December.    Tx with ibuprofen. Was seen in  for urinary issue, no UTI.     Ann-Marie Pineda LPN LPN....................  1/7/2020   11:40 AM    Chief Complaint   Patient presents with     Bladder Problems       Medication Reconciliation: complete    Ann-Marie Pineda LPN      Laura Romero is a 64 year old female who presents to clinic today for bladder issues.  Had been to the [facility] before [place].  Was having frequency, pressure and lower back pain at the time.  She went there and had a urinalysis which did not show a urinary tract infection.  She had been started on antibiotics, but urine culture was negative.  She felt better for a while, but now is having symptoms again.  If she has to have a bowel movement, seems to take longer to get everything out.  Has a bowel movement daily and doesn't seem to have constipation.  Had a hysterectomy for prolapse previously.  Has a sense of pressure similar to when she had the prolapse previously.  Has had low back pain and frequency again.  No dysuria.  Has had urgency as well.  No hematuria noted.  No fever.  Has a history of nephrolithiasis in the past.  Last stones were about 14 years ago.    HPI    I personally reviewed medications/allergies/history listed below:    Patient Active Problem List    Diagnosis Date Noted     Mild intermittent extrinsic asthma without complication 04/24/2019     Priority: Medium     Hypercholesterolemia 02/08/2018     Priority: Medium     Obesity 02/08/2018     Priority: Medium     Overview:   Obesity, BMI 41.53;   Has joined exercise program and working on dietary changes        Rosacea 02/08/2018     Priority: Medium     Asymptomatic varicose veins 02/08/2018     Priority: Medium      Overview:   preloader comment: selections available to preload differs from paper chart.    *Varicosities of the left leg.       ACP (advance care planning) 05/12/2016     Priority: Medium     Advance Care Planning 5/12/2016: ACP Review of Chart / Resources Provided:  Reviewed chart for advance care plan.  Laura Romero has been provided information and resources to begin or update their advance care plan.  Added by Siri Westbrook             S/P nasal septoplasty 03/27/2013     Priority: Medium     Rhinitis, allergic 03/27/2013     Priority: Medium     Perennial allergic rhinitis 03/27/2013     Priority: Medium     Nephrolithiasis 12/12/2012     Priority: Medium     Allergic rhinitis 02/03/2012     Priority: Medium     Pain in joint, lower leg 05/04/2011     Priority: Medium     Overview:   possible meniscal tear       HTN (hypertension) 10/04/2010     Priority: Medium     Hypothyroidism 10/04/2010     Priority: Medium     Past Medical History:   Diagnosis Date     Bacteremia      12/04,Negative coag testing     Calculus of kidney     age 19 and age 50     Chronic sinusitis     5/20/2011     Deviated nasal septum     8/3/2012     Deviated nasal septum     sever turbinate hypertophy and superior septal deviation to right with recurrent sinusitis     Gastro-esophageal reflux disease without esophagitis     8/31/2009     Personal history of other medical treatment (CODE)     rectocele with cervical prolapse     Phlebitis and thrombophlebitis     1/6/11, after varicose vein ablation     Phlebitis and thrombophlebitis of lower extremities, unspecified (CODE)     1/6/2011,Greater saphenous vein left lower extremity     Pneumonia     7/27/2012      Past Surgical History:   Procedure Laterality Date     COLONOSCOPY      10/13/08,which was normal.  Next colonoscopy due in 2018.     HYSTERECTOMY VAGINAL      04/27/05, anterior repair with posterior perineorrhaphy and vaginal vault suspension     LAPAROSCOPIC TUBAL  LIGATION      No Comments Provided     OTHER SURGICAL HISTORY      ,LITHOTRIPSY, with stent placement for right ureteral stone     OTHER SURGICAL HISTORY      05,[id number],REMOVE,ureteral Stent removal     SEPTOPLASTY      2012,with turbinate reduction;  Dr. England     TONSILLECTOMY      No Comments Provided     Family History   Problem Relation Age of Onset     Heart Disease Mother 49        Heart Disease,MI     Hypertension Mother         Hypertension     Arthritis Father         Arthritis,Rheumatoid arthritis     Heart Disease Father         Heart Disease, MI     Hypertension Father         Hypertension     Other - See Comments Maternal Grandmother          of blood clot at childbirth.     Hypertension Sister         Hypertension     Hypertension Brother         Hypertension     Other - See Comments Other         Did have a blood clot     Other - See Comments Daughter         GI Disease,Ulcerative colitis diagnosed 2009     Breast Cancer No family hx of         Cancer-breast     Social History     Tobacco Use     Smoking status: Never Smoker     Smokeless tobacco: Never Used   Substance Use Topics     Alcohol use: Yes     Alcohol/week: 0.0 standard drinks     Comment: Alcoholic Drinks/day: infrequent social     Social History     Social History Narrative    Retired from  [employer]; now helping with grandchildren    Patient has never smoked.     Passive smoke exposure - no    Alcohol Use - yes    Drug Use - no    HIV/High Risk - no    Regular Exercise - yes     Meghana Anderson     Current Outpatient Medications   Medication Sig Dispense Refill     aspirin EC 81 MG EC tablet Take 81 mg by mouth daily with food       budesonide (PULMICORT) 0.5 MG/2ML neb solution Spray 2 mLs (0.5 mg) in nostril 2 times daily Make 240 cc Sammy med sinus irrigation Mix 2 ml vial of budesonide 0.5 mg Rinse 1-2 times a day PRN for 2-4 weeks 2 Box 1     cetirizine (ZYRTEC) 10 MG tablet Take 10 mg by mouth daily        "Cholecalciferol (VITAMIN D PO) Take 1 tablet by mouth daily        fluticasone (FLONASE) 50 MCG/ACT spray Spray 2 sprays into both nostrils daily 1 Bottle 3     Krill Oil 500 MG CAPS Take 1 capsule by mouth daily       levothyroxine (SYNTHROID/LEVOTHROID) 100 MCG tablet TAKE 1 TABLET(100 MCG) BY MOUTH EVERY MORNING BEFORE BREAKFAST 90 tablet 3     lisinopril (PRINIVIL/ZESTRIL) 20 MG tablet TAKE 1 TABLET(20 MG) BY MOUTH DAILY 90 tablet 3     metroNIDAZOLE (METROGEL) 0.75 % topical gel Apply topically 2 times daily 45 g 3     Multiple Vitamin (MULTIVITAMIN) per tablet Take 1 tablet by mouth daily. Food       pravastatin (PRAVACHOL) 40 MG tablet Take 1 tablet (40 mg) by mouth At Bedtime 90 tablet 3     montelukast (SINGULAIR) 10 MG tablet Take 1 tablet (10 mg) by mouth At Bedtime (Patient not taking: Reported on 12/18/2019) 30 tablet 11     Allergies   Allergen Reactions     Azithromycin Hives     Zithromax         Review of Systems   Constitutional: Negative for chills and fever.   Cardiovascular: Negative for peripheral edema.   Genitourinary: Positive for difficulty urinating, frequency and urgency. Negative for dysuria and hematuria.   Musculoskeletal: Positive for back pain.        OBJECTIVE:     /62 (BP Location: Right arm, Patient Position: Sitting, Cuff Size: Adult Large)   Pulse 70   Temp 99.7  F (37.6  C) (Tympanic)   Resp 16   Ht 1.626 m (5' 4\")   Wt 108.4 kg (239 lb)   Breastfeeding No   BMI 41.02 kg/m    Body mass index is 41.02 kg/m .  Physical Exam  Constitutional:       Appearance: Normal appearance.   HENT:      Head: Normocephalic.   Neck:      Musculoskeletal: Normal range of motion and neck supple.   Cardiovascular:      Rate and Rhythm: Normal rate and regular rhythm.      Pulses: Normal pulses.      Heart sounds: No murmur.   Pulmonary:      Effort: Pulmonary effort is normal.      Breath sounds: Normal breath sounds. No wheezing, rhonchi or rales.   Abdominal:      General: Bowel " sounds are normal. There is no distension.      Palpations: Abdomen is soft. There is no mass.      Tenderness: There is abdominal tenderness (mild tenderness in suprapubic region). There is no guarding or rebound.      Hernia: No hernia is present.   Genitourinary:     Comments: Pelvic Exam:  Vulva: No external lesions, normal hair distribution, no adenopathy  Vagina: Moist, pink, no abnormal discharge, well rugated, no lesions.  Does feel to have more laxity of posterior vaginal wall than anterior.  Cervix:surgically absent.  Uterus: surgically absent.  Musculoskeletal:      Right lower leg: No edema.      Left lower leg: No edema.      Comments: Mild lower back discomfort in a band-like distribution just above pants line.   Lymphadenopathy:      Cervical: No cervical adenopathy.   Neurological:      Mental Status: She is alert.           PHQ-9 SCORE 7/30/2015 8/4/2016   PHQ-9 Total Score 0 0       PHQ-2 Score:     PHQ-2 ( 1999 Pf[employer]) 1/7/2020 12/18/2019   Q1: Little interest or pleasure in doing things 0 0   Q2: Feeling down, depressed or hopeless 0 0   PHQ-2 Score 0 0       No flowsheet data found.      No flowsheet data found.      I personally reviewed results withpatient as listed below:   Diagnostic Test Results:  Results for orders placed or performed in visit on 01/07/20   UA reflex to Microscopic and Culture     Status: Abnormal   Result Value Ref Range    Color Urine Yellow     Appearance Urine Clear     Glucose Urine Negative NEG^Negative mg/dL    Bilirubin Urine Negative NEG^Negative    Ketones Urine Negative NEG^Negative mg/dL    Specific Gravity Urine 1.020 1.000 - 1.030    Blood Urine Trace (A) NEG^Negative    pH Urine 5.0 5.0 - 9.0 pH    Protein Albumin Urine Negative NEG^Negative mg/dL    Urobilinogen Urine 0.2 0.2 - 1.0 EU/dL    Nitrite Urine Negative NEG^Negative    Leukocyte Esterase Urine Small (A) NEG^Negative    Source Midstream Urine    Urine Microscopic     Status: Abnormal   Result Value  Ref Range    WBC Urine 0 - 5 OTO5^0 - 5 /HPF    RBC Urine O - 2 OTO2^O - 2 /HPF    Squamous Epithelial /LPF Urine Few FEW^Few /LPF    Bacteria Urine Few (A) NEG^Negative /HPF       ASSESSMENT/PLAN:       ICD-10-CM    1. Urinary frequency R35.0 UA reflex to Microscopic and Culture     Urine Culture Aerobic Bacterial     CT Abdomen Pelvis w/o & w Contrast     MEASURE POST-VOID RESIDUAL URINE/BLADDER CAPACITY, US NON-IMAGING     Urine Microscopic   2. Pelvic pain in female R10.2 CT Abdomen Pelvis w/o & w Contrast     MEASURE POST-VOID RESIDUAL URINE/BLADDER CAPACITY, US NON-IMAGING   3. Bilateral low back pain, unspecified chronicity, unspecified whether sciatica present M54.5 CT Abdomen Pelvis w/o & w Contrast     MEASURE POST-VOID RESIDUAL URINE/BLADDER CAPACITY, US NON-IMAGING       1.  Urinalysis looks fairly normal today, but previously had shown some calcium oxalate crystals.  Discussed that this could represent underlying kidney stones.  Urine culture will be sent to evaluation further for the possibility of urinary tract infection.  Her post-void residual was 79 ml today, which is a little more than I would expect for her to have.  She does not appear to have a significant cystocele on my exam that would be contributing, but this is still a possibility.  Will obtain CT of abdomen/pelvis w/wo contrast to evaluate further for the possibility of stone or other intraabdominal pathology which could be contributing.  Consider urology and/or Gynecology consult pending results.  2.  See #1.  She has had a unilateral oopherectomy as well as hysterectomy in the past.  CT as above.  3.  See #1.    Gloria Mcnally MD  [facility]    Portions of this dictation were created using the Dragon Nuance voice recognition system. Proofreading was completed but there may be errors in text.

## 2020-01-07 NOTE — NURSING NOTE
Patient in clinic for bladder problem x 2 weeks, also patient feeling pressure in lower abdominal and pelvic area.  Pain accross lower back since mid December.    Tx with ibuprofen. Was seen in [facility] for urinary issue, no UTI.     Ann-Marie Pineda LPN LPN....................  1/7/2020   11:40 AM    Chief Complaint   Patient presents with     Bladder Problems       Medication Reconciliation: complete    Ann-Marie Pineda LPN

## 2020-01-08 ASSESSMENT — ENCOUNTER SYMPTOMS
DIFFICULTY URINATING: 1
BACK PAIN: 1
FEVER: 0
CHILLS: 0
DYSURIA: 0
FREQUENCY: 1
HEMATURIA: 0

## 2020-01-09 LAB
BACTERIA SPEC CULT: NO GROWTH
SPECIMEN SOURCE: NORMAL

## 2020-01-10 ENCOUNTER — HOSPITAL ENCOUNTER (OUTPATIENT)
Dept: CT IMAGING | Facility: OTHER | Age: 65
Discharge: HOME | End: 2020-01-10
Attending: FAMILY MEDICINE | Admitting: FAMILY MEDICINE
Payer: MEDICARE

## 2020-01-10 DIAGNOSIS — R10.2 PELVIC PAIN IN FEMALE: ICD-10-CM

## 2020-01-10 DIAGNOSIS — M54.50 BILATERAL LOW BACK PAIN, UNSPECIFIED CHRONICITY, UNSPECIFIED WHETHER SCIATICA PRESENT: ICD-10-CM

## 2020-01-10 DIAGNOSIS — R35.0 URINARY FREQUENCY: ICD-10-CM

## 2020-01-10 DIAGNOSIS — D17.71 ANGIOMYOLIPOMA OF LEFT KIDNEY: Primary | ICD-10-CM

## 2020-01-10 DIAGNOSIS — N20.0 NEPHROLITHIASIS: ICD-10-CM

## 2020-01-10 PROCEDURE — 74178 CT ABD&PLV WO CNTR FLWD CNTR: CPT

## 2020-01-10 PROCEDURE — 25500064 ZZH RX 255 OP 636: Performed by: FAMILY MEDICINE

## 2020-01-10 RX ADMIN — IOHEXOL 145 ML: 350 INJECTION, SOLUTION INTRAVENOUS at 14:57

## 2020-01-13 ENCOUNTER — OFFICE VISIT (OUTPATIENT)
Dept: UROLOGY | Facility: OTHER | Age: 65
End: 2020-01-13
Attending: FAMILY MEDICINE
Payer: COMMERCIAL

## 2020-01-13 VITALS
DIASTOLIC BLOOD PRESSURE: 64 MMHG | WEIGHT: 239.2 LBS | HEART RATE: 68 BPM | RESPIRATION RATE: 16 BRPM | SYSTOLIC BLOOD PRESSURE: 114 MMHG | BODY MASS INDEX: 41.06 KG/M2

## 2020-01-13 DIAGNOSIS — N20.0 NEPHROLITHIASIS: ICD-10-CM

## 2020-01-13 DIAGNOSIS — D17.71 ANGIOMYOLIPOMA OF LEFT KIDNEY: ICD-10-CM

## 2020-01-13 PROCEDURE — 99204 OFFICE O/P NEW MOD 45 MIN: CPT | Performed by: UROLOGY

## 2020-01-13 PROCEDURE — G0463 HOSPITAL OUTPT CLINIC VISIT: HCPCS

## 2020-01-13 ASSESSMENT — PAIN SCALES - GENERAL: PAINLEVEL: NO PAIN (0)

## 2020-01-13 NOTE — NURSING NOTE
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  Medication Reconciliation: complete    Yady Barber LPN 1/13/2020 2:43 PM      Review of Systems:    Weight loss:    No     Recent fever/chills:  No   Night sweats:   No  Current skin rash:  No   Recent hair loss:  No  Heat intolerance:  No   Cold intolerance:  No  Chest pain:   No   Palpitations:   No  Shortness of breath:  No   Wheezing:   No  Constipation:    No   Diarrhea:   yes   Nausea:   No   Vomiting:   No   Kidney/side pain:  No   Back pain:   No  Frequent headaches:  No   Dizziness:     No  Leg swelling:   No   Calf pain:    No    Parents, brothers or sisters with history of kidney cancer:   No  Parents, brothers or sisters with history of bladder cancer: No  Father or brother with history of prostate cancer:  No

## 2020-01-13 NOTE — PROGRESS NOTES
Type of Visit  NPV    Chief Complaint  AML  History of kidney stones    HPI  Ms. Romero is a 65 year old female with history of kidney stones who presents with incidental AML finding.  She has hd 2 stone events in the last 30 years, one of which required surgical intervention.  She denies hematuria, dysuria and flank pain.  The patient underwent a CT scan a few days ago due to flank pain and history of stone disease.  Incidentally the patient was identified as having a possible AML.  She follows up today due to the unexpected finding.  She denies flank pain today and also denies gross hematuria.      Past Medical History  She  has a past medical history of Bacteremia, Calculus of kidney, Chronic sinusitis, Deviated nasal septum, Deviated nasal septum, Gastro-esophageal reflux disease without esophagitis, Personal history of other medical treatment (CODE), Phlebitis and thrombophlebitis, Phlebitis and thrombophlebitis of lower extremities, unspecified (CODE), and Pneumonia.  Patient Active Problem List   Diagnosis     S/P nasal septoplasty     Rhinitis, allergic     Perennial allergic rhinitis     ACP (advance care planning)     Allergic rhinitis     HTN (hypertension)     Hypercholesterolemia     Hypothyroidism     Pain in joint, lower leg     Nephrolithiasis     Obesity     Rosacea     Asymptomatic varicose veins     Mild intermittent extrinsic asthma without complication       Past Surgical History  She  has a past surgical history that includes Tonsillectomy; Septoplasty; Laparoscopic tubal ligation; Hysterectomy vaginal; other surgical history; other surgical history; and Colonoscopy.    Medications  She has a current medication list which includes the following prescription(s): aspirin, budesonide, cetirizine, vitamin d, fluticasone, krill oil, levothyroxine, lisinopril, metronidazole, montelukast, multivitamin, and pravastatin.    Allergies  Allergies   Allergen Reactions     Azithromycin Hives     Zithromax          Social History  She  reports that she has never smoked. She has never used smokeless tobacco. She reports current alcohol use. She reports that she does not use drugs.  No drug abuse.    Family History  Family History   Problem Relation Age of Onset     Heart Disease Mother 49        Heart Disease,MI     Hypertension Mother         Hypertension     Arthritis Father         Arthritis,Rheumatoid arthritis     Heart Disease Father         Heart Disease, MI     Hypertension Father         Hypertension     Other - See Comments Maternal Grandmother          of blood clot at childbirth.     Hypertension Sister         Hypertension     Hypertension Brother         Hypertension     Other - See Comments Other         Did have a blood clot     Other - See Comments Daughter         GI Disease,Ulcerative colitis diagnosed 2009     Breast Cancer No family hx of         Cancer-breast       Review of Systems  I personally reviewed the ROS with the patient.    Nursing Notes:   Yady Torres LPN  2020  2:59 PM  Signed  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  Medication Reconciliation: complete    Yady Barber LPN 2020 2:43 PM      Review of Systems:    Weight loss:    No     Recent fever/chills:  No   Night sweats:   No  Current skin rash:  No   Recent hair loss:  No  Heat intolerance:  No   Cold intolerance:  No  Chest pain:   No   Palpitations:   No  Shortness of breath:  No   Wheezing:   No  Constipation:    No   Diarrhea:   yes   Nausea:   No   Vomiting:   No   Kidney/side pain:  No   Back pain:   No  Frequent headaches:  No   Dizziness:     No  Leg swelling:   No   Calf pain:    No    Parents, brothers or sisters with history of kidney cancer:   No  Parents, brothers or sisters with history of bladder cancer: No  Father or brother with history of prostate cancer:  No              Physical Exam  Vitals:     01/13/20 1446   BP: 114/64   Pulse: 68   Resp: 16   Weight: 108.5 kg (239 lb 3.2 oz)   Constitutional: NAD, WDWN  Head: NCAT  Eyes: Conjunctivae normal  Cardiovascular: Regular rate  Pulmonary/Chest: Respirations are even and non-labored bilaterally  Abdominal: Soft with no distension, tenderness, masses, guarding or CVA tenderness  Neurological: A + O x 3,  cranial nerves II-XII grossly intact  Extremities: ANDREW x 4, warm, no clubbing, no cyanosis  Skin: Pink, warm, dry with no rash  Psychiatric:  Normal mood and affect  Genitourinary: nonpalpable bladder    Labs  Results for ARCENIO ROMERO (MRN [id number]) as of 1/13/2020 15:02   4/23/2019 09:19   Sodium 139   Potassium 4.1   Chloride 103   Carbon Dioxide 29   Urea Nitrogen 14   Creatinine 0.64   GFR Estimate >90   GFR Estimate If Black >90   Calcium 9.1   Anion Gap 7   Albumin 4.2   Protein Total 6.9   Bilirubin Total 0.5   Alkaline Phosphatase 63   ALT 18   AST 18     Imaging  I personally reviewed and interpreted the images and report.  CT Urogram  1/10/2020  IMPRESSION:   Renal lithiasis of the left kidney consisting of 2 small 2 mm calculi  as described. No evidence of ureteral or bladder calculus.     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      7.9 mm and 9.8 mm enhancing lesions within the spleen suggesting the  presence of small splenic hamartomas.    Assessment & Plan  Ms. Romero is a 65 year old female with very small AML and a history of kidney stones with a recent CT scan revealing no significant stone disease.  I reviewed the CT scan with the patient as well as her recent labs.    We discussed stone prevention strategies diet and fluids.    Regarding the AML I explained that these are not cancerous and the primary concern is if they reach 4 to 6 cm in size there is a higher rate of spontaneous retroperitoneal hemorrhage.  The AML that was identified on the CT scan was 1 cm and as such I explained that if she does  not undergo any abdominal imaging in the next 3 to 5 years perhaps a renal ultrasound would be indicated to assure that the AML has not significantly changed in size.

## 2020-01-15 NOTE — PROGRESS NOTES
Outpatient Physical Therapy Discharge Note     Patient: Laura Romero  : 1955     Beginning/End Dates of Reporting Period:  9-10-19 to 2019     Referring Provider: Gloria Barrera MD     Therapy Diagnosis:  Chronic R shoulder pain, impingement syndrome           Client Self Report: Pt was last seen on 19. At that time the plan was for her to follow up her PCP as her shoulder improvement had plateaued with intermittent episodes of pain. Since then she has had an MRI and an ortho consult. Refer to her record for specifics.      Subjective report at the time of her last session:  Pt was last seen one month ago, working on her home exercises since then. Notes that her left shoulder has been bad in the last week or so. Cannot sleep on her side at all. Notes she was up early this morning due to pain and iced which helped. Did not do anything to aggravate it. Has been limiting her exercise in her water class. Did not do any raking this fall. Frustrated that her progress is just so up and down.      Objective Measurements: Current status unknown. Clinical findings on 19 as follows:  Objective Measure: ROM  Details: full flexion with pain at end range, decreased abduction by -10 with end range pain. Normal IR/ER but painful  Objective Measure: Joint mobility  Details: Tightness  with  inferior spring of the coracoid but not painful.  AC joint mobility decreased again at  end range elevation.   Objective Measure: Myofascial  Details: No significant guarding in the  pectoralis minor. Nontender over the supraspinatus, subscapularis. Good scapular mobility.         Goals: current status unknown  Goal Identifier HEP   Goal Description Pt to display independence in HEP to assist with self management.   Target Date 19   Date Met      Progress:      Goal Identifier sleep   Goal Description Pt to report the ability to lie on the right side for 2-3 hours of sleep with minimal pain.   Target Date  11/28/19   Date Met   11/5/2019    Progress: compliance depends on pain level. Good judgement on pt's part re: exercise tolerance      Goal Identifier strength   Goal Description Pt to display at least grade 4/5 shoulder strength to allow her to perform housework tasks with minimal to no pain   Target Date 11/28/19   Date Met      Progress: limited by pain      Goal Identifier ROM   Goal Description Pt to display full R shoulder AROM without pain to assist in reaching overhead   Target Date 11/28/19   Date Met      Progress: partially met--some days full ROM, others mildly limited due to pain         Progress Toward Goals:  Current status unknown--progress on 11-5-19 as follows:  Progress limited due to continued intermittent shoulder pain limiting exercise and activity tolerance. Not predictable.     Home Program:  Warm up with supine rotator cuff program X 5-10 reps each: elevation, lateral, circumduction, bench press, protraction, SL abd. SL ER 8 oz up to 3 lb, Codman's prn, IR red theraband, standing extension & adduction from 90 red theraband. Standing abduction to 90 4-8 oz, standing flexion to 90 4-8 oz. Ball on wall: scap stab. Finish with stretching: posterior shoulder stretch (hand on shoulder and hand to ceiling). Wall slide with overpressure, 90/90 pectoral stretch doorframe. Bent horizontal abd 1#, Bent extension 1#      Plan:  Discharge from therapy.    Discharge:    Reason for Discharge:  Pt has since had an ortho consult with Dr. Hairston and received a cortisone injection (12-23-19). No further word received from patient re :need for further PT.      Discharge Plan: Patient to continue home program.    Kim Medina, PT on 1/15/2020 at 1:54 PM

## 2020-02-27 ENCOUNTER — MYC MEDICAL ADVICE (OUTPATIENT)
Dept: FAMILY MEDICINE | Facility: OTHER | Age: 65
End: 2020-02-27

## 2020-02-27 DIAGNOSIS — L71.9 ROSACEA: ICD-10-CM

## 2020-02-27 DIAGNOSIS — I15.9 SECONDARY HYPERTENSION: ICD-10-CM

## 2020-02-27 RX ORDER — METRONIDAZOLE 7.5 MG/G
GEL TOPICAL 2 TIMES DAILY
Qty: 45 G | Refills: 3 | Status: SHIPPED | OUTPATIENT
Start: 2020-02-27 | End: 2022-12-05

## 2020-02-27 RX ORDER — LISINOPRIL 20 MG/1
TABLET ORAL
Qty: 90 TABLET | Refills: 0 | Status: SHIPPED | OUTPATIENT
Start: 2020-02-27 | End: 2020-05-22

## 2020-02-27 NOTE — TELEPHONE ENCOUNTER
"Requested Prescriptions   Pending Prescriptions Disp Refills     lisinopril (ZESTRIL) 20 MG tablet [Pharmacy Med Name: LISINOPRIL 20MG TABLETS] 90 tablet 3     Sig: TAKE 1 TABLET(20 MG) BY MOUTH DAILY       ACE Inhibitors (Including Combos) Protocol Passed - 2/27/2020 10:23 AM        Passed - Blood pressure under 140/90 in past 12 months     BP Readings from Last 3 Encounters:   01/13/20 114/64   01/07/20 120/62   12/18/19 118/68                 Passed - Recent (12 mo) or future (30 days) visit within the authorizing provider's specialty     Patient has had an office visit with the authorizing provider or a provider within the authorizing providers department within the previous 12 mos or has a future within next 30 days. See \"Patient Info\" tab in inbasket, or \"Choose Columns\" in Meds & Orders section of the refill encounter.              Passed - Medication is active on med list        Passed - Patient is age 18 or older        Passed - No active pregnancy on record        Passed - Normal serum creatinine on file in past 12 months     Recent Labs   Lab Test 04/23/19  0919   CR 0.64             Passed - Normal serum potassium on file in past 12 months     Recent Labs   Lab Test 04/23/19  0919   POTASSIUM 4.1             Passed - No positive pregnancy test within past 12 months      LOV 1/7/20  Last labs per protocol 4/23/19  Prescription approved per [facility] Refill Protocol.  Brenda J. Goodell, RN on 2/27/2020 at 3:16 PM      "

## 2020-03-02 ENCOUNTER — HEALTH MAINTENANCE LETTER (OUTPATIENT)
Age: 65
End: 2020-03-02

## 2020-04-01 DIAGNOSIS — J30.89 PERENNIAL ALLERGIC RHINITIS: ICD-10-CM

## 2020-04-01 DIAGNOSIS — J30.2 SEASONAL ALLERGIC RHINITIS, UNSPECIFIED TRIGGER: ICD-10-CM

## 2020-04-01 NOTE — TELEPHONE ENCOUNTER
budesonide (PULMICORT) 0.5 MG/2ML neb solution      Last Written Prescription Date:  9/110/19  Last Fill Quantity: 2,   # refills: 1  Last Office Visit: 10/3/19 with Lorelei  Future Office visit:       Routing refill request to provider for review/approval because:   Asthma control assessment score within normal limits in last 6 months Protocol Details    Recent (6 mo) or future (30 days) visit within the authorizing provider's specialty      Unsure if pt is still under your care. Please advise. Thank you!

## 2020-04-02 RX ORDER — BUDESONIDE 0.5 MG/2ML
INHALANT ORAL
Qty: 360 ML | Refills: 1 | Status: SHIPPED | OUTPATIENT
Start: 2020-04-02 | End: 2021-10-18

## 2020-05-21 DIAGNOSIS — I15.9 SECONDARY HYPERTENSION: ICD-10-CM

## 2020-05-22 RX ORDER — LISINOPRIL 20 MG/1
TABLET ORAL
Qty: 90 TABLET | Refills: 0 | Status: SHIPPED | OUTPATIENT
Start: 2020-05-22 | End: 2020-08-27

## 2020-05-22 NOTE — TELEPHONE ENCOUNTER
"Requested Prescriptions   Pending Prescriptions Disp Refills     lisinopril (ZESTRIL) 20 MG tablet [Pharmacy Med Name: LISINOPRIL 20MG TABLETS] 90 tablet 0     Sig: TAKE 1 TABLET(20 MG) BY MOUTH DAILY       ACE Inhibitors (Including Combos) Protocol Failed - 5/21/2020  9:27 AM        Failed - Normal serum creatinine on file in past 12 months     Recent Labs   Lab Test 04/23/19  0919   CR 0.64       Ok to refill medication if creatinine is low          Failed - Normal serum potassium on file in past 12 months     Recent Labs   Lab Test 04/23/19  0919   POTASSIUM 4.1             Passed - Blood pressure under 140/90 in past 12 months     BP Readings from Last 3 Encounters:   01/13/20 114/64   01/07/20 120/62   12/18/19 118/68                 Passed - Recent (12 mo) or future (30 days) visit within the authorizing provider's specialty     Patient has had an office visit with the authorizing provider or a provider within the authorizing providers department within the previous 12 mos or has a future within next 30 days. See \"Patient Info\" tab in inbasket, or \"Choose Columns\" in Meds & Orders section of the refill encounter.              Passed - Medication is active on med list        Passed - Patient is age 18 or older        Passed - No active pregnancy on record        Passed - No positive pregnancy test within past 12 months              Last Written Prescription Date:  02/27/2020  Last Fill Quantity: 90,   # refills: 0  Last Office Visit: 01/07/2020 with Gloria Mcnally MD  Future Office visit:   None noted.  Unable to complete prescription refill per RN medication refill policy. Will route to provider for review and consideration.  Margie Bhagat RN on 5/22/2020 at 9:17 AM              "

## 2020-06-12 DIAGNOSIS — E78.00 HYPERCHOLESTEROLEMIA: ICD-10-CM

## 2020-06-16 RX ORDER — PRAVASTATIN SODIUM 40 MG
TABLET ORAL
Qty: 90 TABLET | Refills: 1 | Status: SHIPPED | OUTPATIENT
Start: 2020-06-16 | End: 2020-08-27

## 2020-06-17 DIAGNOSIS — E03.9 HYPOTHYROIDISM, UNSPECIFIED TYPE: ICD-10-CM

## 2020-06-17 NOTE — LETTER
June 18, 2020      Laura Romero  [address]  [place] [address]        Dear Laura,     A refill request was received from your pharmacy for Levothyroxine.    Additional refills require an office visit with Dr. Saad Barrera for annual review and labs.    Please call [phone] to schedule appointment.        Sincerely,      Refill nurse

## 2020-06-18 RX ORDER — LEVOTHYROXINE SODIUM 100 UG/1
TABLET ORAL
Qty: 90 TABLET | Refills: 0 | Status: SHIPPED | OUTPATIENT
Start: 2020-06-18 | End: 2020-08-27

## 2020-06-18 NOTE — TELEPHONE ENCOUNTER
Prescription approved per [facility] [place].    Labs not current:  TSH    LOV: and labd 4/23/2019  Patient is due for annual review and labs.    Letter and my chart message sent.  Yaa refill given  Anny Das RN on 6/18/2020 at 2:32 PM

## 2020-08-27 ENCOUNTER — OFFICE VISIT (OUTPATIENT)
Dept: FAMILY MEDICINE | Facility: OTHER | Age: 65
End: 2020-08-27
Attending: FAMILY MEDICINE
Payer: MEDICARE

## 2020-08-27 VITALS
RESPIRATION RATE: 20 BRPM | OXYGEN SATURATION: 96 % | HEART RATE: 73 BPM | HEIGHT: 64 IN | TEMPERATURE: 98.2 F | DIASTOLIC BLOOD PRESSURE: 74 MMHG | BODY MASS INDEX: 40.37 KG/M2 | SYSTOLIC BLOOD PRESSURE: 116 MMHG | WEIGHT: 236.5 LBS

## 2020-08-27 DIAGNOSIS — Z00.00 ENCOUNTER FOR MEDICARE ANNUAL WELLNESS EXAM: Primary | ICD-10-CM

## 2020-08-27 DIAGNOSIS — E78.00 HYPERCHOLESTEROLEMIA: ICD-10-CM

## 2020-08-27 DIAGNOSIS — E03.9 HYPOTHYROIDISM, UNSPECIFIED TYPE: ICD-10-CM

## 2020-08-27 DIAGNOSIS — Z13.0 SCREENING FOR DEFICIENCY ANEMIA: ICD-10-CM

## 2020-08-27 DIAGNOSIS — Z12.11 SCREEN FOR COLON CANCER: ICD-10-CM

## 2020-08-27 DIAGNOSIS — Z23 NEED FOR VACCINATION FOR PNEUMOCOCCUS: ICD-10-CM

## 2020-08-27 DIAGNOSIS — Z78.0 POSTMENOPAUSE: ICD-10-CM

## 2020-08-27 DIAGNOSIS — I10 ESSENTIAL HYPERTENSION: ICD-10-CM

## 2020-08-27 DIAGNOSIS — Z23 NEED FOR SHINGLES VACCINE: ICD-10-CM

## 2020-08-27 DIAGNOSIS — J30.89 PERENNIAL ALLERGIC RHINITIS: ICD-10-CM

## 2020-08-27 DIAGNOSIS — Z13.6 SCREENING FOR AAA (ABDOMINAL AORTIC ANEURYSM): ICD-10-CM

## 2020-08-27 LAB
ALBUMIN SERPL-MCNC: 4.5 G/DL (ref 3.5–5.7)
ALP SERPL-CCNC: 60 U/L (ref 34–104)
ALT SERPL W P-5'-P-CCNC: 16 U/L (ref 7–52)
ANION GAP SERPL CALCULATED.3IONS-SCNC: 8 MMOL/L (ref 3–14)
AST SERPL W P-5'-P-CCNC: 17 U/L (ref 13–39)
BASOPHILS # BLD AUTO: 0.1 10E9/L (ref 0–0.2)
BASOPHILS NFR BLD AUTO: 0.9 %
BILIRUB SERPL-MCNC: 0.7 MG/DL (ref 0.3–1)
BUN SERPL-MCNC: 16 MG/DL (ref 7–25)
CALCIUM SERPL-MCNC: 9.5 MG/DL (ref 8.6–10.3)
CHLORIDE SERPL-SCNC: 102 MMOL/L (ref 98–107)
CHOLEST SERPL-MCNC: 213 MG/DL
CO2 SERPL-SCNC: 32 MMOL/L (ref 21–31)
CREAT SERPL-MCNC: 0.74 MG/DL (ref 0.6–1.2)
DIFFERENTIAL METHOD BLD: ABNORMAL
EOSINOPHIL # BLD AUTO: 0.1 10E9/L (ref 0–0.7)
EOSINOPHIL NFR BLD AUTO: 1.4 %
ERYTHROCYTE [DISTWIDTH] IN BLOOD BY AUTOMATED COUNT: 16.7 % (ref 10–15)
GFR SERPL CREATININE-BSD FRML MDRD: 79 ML/MIN/{1.73_M2}
GLUCOSE SERPL-MCNC: 94 MG/DL (ref 70–105)
HCT VFR BLD AUTO: 45.6 % (ref 35–47)
HDLC SERPL-MCNC: 69 MG/DL (ref 23–92)
HGB BLD-MCNC: 14.2 G/DL (ref 11.7–15.7)
IMM GRANULOCYTES # BLD: 0 10E9/L (ref 0–0.4)
IMM GRANULOCYTES NFR BLD: 0.3 %
LDLC SERPL CALC-MCNC: 120 MG/DL
LYMPHOCYTES # BLD AUTO: 2.5 10E9/L (ref 0.8–5.3)
LYMPHOCYTES NFR BLD AUTO: 37.2 %
MCH RBC QN AUTO: 27.1 PG (ref 26.5–33)
MCHC RBC AUTO-ENTMCNC: 31.1 G/DL (ref 31.5–36.5)
MCV RBC AUTO: 87 FL (ref 78–100)
MONOCYTES # BLD AUTO: 0.5 10E9/L (ref 0–1.3)
MONOCYTES NFR BLD AUTO: 7.6 %
NEUTROPHILS # BLD AUTO: 3.5 10E9/L (ref 1.6–8.3)
NEUTROPHILS NFR BLD AUTO: 52.6 %
NONHDLC SERPL-MCNC: 144 MG/DL
PLATELET # BLD AUTO: 275 10E9/L (ref 150–450)
POTASSIUM SERPL-SCNC: 4.2 MMOL/L (ref 3.5–5.1)
PROT SERPL-MCNC: 7.1 G/DL (ref 6.4–8.9)
RBC # BLD AUTO: 5.24 10E12/L (ref 3.8–5.2)
SODIUM SERPL-SCNC: 142 MMOL/L (ref 134–144)
TRIGL SERPL-MCNC: 118 MG/DL
TSH SERPL DL<=0.05 MIU/L-ACNC: 0.89 IU/ML (ref 0.34–5.6)
WBC # BLD AUTO: 6.6 10E9/L (ref 4–11)

## 2020-08-27 PROCEDURE — G0402 INITIAL PREVENTIVE EXAM: HCPCS | Performed by: FAMILY MEDICINE

## 2020-08-27 PROCEDURE — 80061 LIPID PANEL: CPT | Mod: ZL | Performed by: FAMILY MEDICINE

## 2020-08-27 PROCEDURE — 80053 COMPREHEN METABOLIC PANEL: CPT | Mod: ZL | Performed by: FAMILY MEDICINE

## 2020-08-27 PROCEDURE — G0463 HOSPITAL OUTPT CLINIC VISIT: HCPCS | Mod: 25

## 2020-08-27 PROCEDURE — 36415 COLL VENOUS BLD VENIPUNCTURE: CPT | Mod: ZL | Performed by: FAMILY MEDICINE

## 2020-08-27 PROCEDURE — 85025 COMPLETE CBC W/AUTO DIFF WBC: CPT | Mod: ZL | Performed by: FAMILY MEDICINE

## 2020-08-27 PROCEDURE — G0009 ADMIN PNEUMOCOCCAL VACCINE: HCPCS

## 2020-08-27 PROCEDURE — 90670 PCV13 VACCINE IM: CPT

## 2020-08-27 PROCEDURE — 84443 ASSAY THYROID STIM HORMONE: CPT | Mod: ZL | Performed by: FAMILY MEDICINE

## 2020-08-27 RX ORDER — FLUTICASONE PROPIONATE 50 MCG
2 SPRAY, SUSPENSION (ML) NASAL DAILY
Qty: 18.2 ML | Refills: 11 | COMMUNITY
Start: 2020-08-27 | End: 2021-11-05

## 2020-08-27 RX ORDER — LEVOTHYROXINE SODIUM 100 UG/1
TABLET ORAL
Qty: 90 TABLET | Refills: 3 | Status: SHIPPED | OUTPATIENT
Start: 2020-08-27 | End: 2021-09-20

## 2020-08-27 RX ORDER — ALBUTEROL SULFATE 90 UG/1
2 AEROSOL, METERED RESPIRATORY (INHALATION) EVERY 4 HOURS PRN
Qty: 1 INHALER | Refills: 11 | Status: SHIPPED | OUTPATIENT
Start: 2020-08-27 | End: 2022-12-05

## 2020-08-27 RX ORDER — LISINOPRIL 20 MG/1
20 TABLET ORAL DAILY
Qty: 90 TABLET | Refills: 3 | Status: SHIPPED | OUTPATIENT
Start: 2020-08-27 | End: 2021-08-27

## 2020-08-27 RX ORDER — BUDESONIDE 0.5 MG/2ML
INHALANT ORAL
Qty: 360 ML | Refills: 11 | Status: CANCELLED | OUTPATIENT
Start: 2020-08-27

## 2020-08-27 RX ORDER — PRAVASTATIN SODIUM 40 MG
40 TABLET ORAL AT BEDTIME
Qty: 90 TABLET | Refills: 3 | Status: SHIPPED | OUTPATIENT
Start: 2020-08-27 | End: 2021-09-20

## 2020-08-27 ASSESSMENT — MIFFLIN-ST. JEOR: SCORE: 1594.82

## 2020-08-27 ASSESSMENT — PAIN SCALES - GENERAL: PAINLEVEL: NO PAIN (0)

## 2020-08-27 NOTE — PATIENT INSTRUCTIONS
Patient Education   Personalized Prevention Plan  You are due for the preventive services outlined below.  Your care team is available to assist you in scheduling these services.  If you have already completed any of these items, please share that information with your care team to update in your medical record.  Health Maintenance Due   Topic Date Due     Osteoporosis Screening  1955     Asthma Action Plan - yearly  1955     Asthma Control Test  1955     HIV Screening  01/10/1970     Colorectal Cancer Screening  10/13/2018     Annual Wellness Visit  01/10/2020     Pneumococcal Vaccine (1 of 2 - PCV13) 01/10/2020     Flu Vaccine (1) 09/01/2020

## 2020-08-27 NOTE — NURSING NOTE
Patient presents to clinic for welcome to Medicare physical.  Medication Reconciliation: complete    Nellie Springer LPN

## 2020-08-27 NOTE — PROGRESS NOTES
SUBJECTIVE:   Laura Romero is a 65 year old female who presents for Preventive Visit.    Are you in the first 12 months of your Medicare coverage?  Yes,  Visual Acuity:  Right Eye: 20/20   Left Eye: 20/20  Both Eyes: 20/20    HPI  Do you feel safe in your environment? Yes    Have you ever done Advance Care Planning? (For example, a Health Directive, POLST, or a discussion with a medical provider or your loved ones about your wishes): No, advance care planning information given to patient to review.  Patient plans to discuss their wishes with loved ones or provider.        Right Ear:      1000 Hz RESPONSE- on Level:   20 db  (Conditioning sound)   1000 Hz: RESPONSE- on Level:   20 db    2000 Hz: RESPONSE- on Level:   20 db    4000 Hz: RESPONSE- on Level:   20 db     Left Ear:      4000 Hz: RESPONSE- on Level:   20 db    2000 Hz: RESPONSE- on Level:   20 db    1000 Hz: RESPONSE- on Level:   20 db     500 Hz: RESPONSE- on Level:   20 db     Right Ear:    500 Hz: RESPONSE- on Level: 25 db    Hearing Acuity: Pass    Hearing Assessment: normal  Fall risk  Fallen 2 or more times in the past year?: No  Any fall with injury in the past year?: No    Cognitive Screening   1) Repeat 3 items (Leader, Season, Table)    2) Clock draw: NORMAL  3) 3 item recall: Recalls 3 objects  Results: 3 items recalled: COGNITIVE IMPAIRMENT LESS LIKELY    Mini-CogTM Copyright JEROD Mckeon. Licensed by the author for use in [facility]; reprinted with permission (osvaldo@.[email or web address]). All rights reserved.      Do you have sleep apnea, excessive snoring or daytime drowsiness?: no    Reviewed and updated as needed this visit by clinical staff  Tobacco  Allergies  Meds  Med Hx  Surg Hx  Fam Hx  Soc Hx        Reviewed and updated as needed this visit by Provider        Social History     Tobacco Use     Smoking status: Never Smoker     Smokeless tobacco: Never Used   Substance Use Topics     Alcohol use: Yes     Alcohol/week: 0.0 standard  drinks     Comment: Alcoholic Drinks/day: infrequent social     If you drink alcohol do you typically have >3 drinks per day or >7 drinks per week? No, 1 per week      Alcohol Use 8/27/2020   Prescreen: >3 drinks/day or >7 drinks/week? Yes           Current providers sharing in care for this patient include:   Patient Care Team:  Gloria Mcnally MD as PCP - General (Family Practice)  Gloria Mcnally MD as Assigned PCP    The following health maintenance items are reviewed in Epic and correct as of today:  Health Maintenance   Topic Date Due     DEXA  1955     ASTHMA ACTION PLAN  1955     ASTHMA CONTROL TEST  1955     HIV SCREENING  01/10/1970     COLORECTAL CANCER SCREENING  10/13/2018     MEDICARE ANNUAL WELLNESS VISIT  01/10/2020     PNEUMOCOCCAL IMMUNIZATION 65+ LOW/MEDIUM RISK (1 of 2 - PCV13) 01/10/2020     INFLUENZA VACCINE (1) 09/01/2020     ZOSTER IMMUNIZATION (1 of 2) 05/10/2021 (Originally 1/10/2005)     FALL RISK ASSESSMENT  10/03/2020     ADVANCE CARE PLANNING  05/12/2021     MAMMO SCREENING  11/04/2021     LIPID  04/23/2024     DTAP/TDAP/TD IMMUNIZATION (3 - Td) 10/26/2027     HEPATITIS C SCREENING  Completed     PHQ-2  Completed     IPV IMMUNIZATION  Aged Out     MENINGITIS IMMUNIZATION  Aged Out     HEPATITIS B IMMUNIZATION  Aged Out     Lab work is in process  BP Readings from Last 3 Encounters:   08/27/20 116/74   01/13/20 114/64   01/07/20 120/62    Wt Readings from Last 3 Encounters:   08/27/20 107.3 kg (236 lb 8 oz)   01/13/20 108.5 kg (239 lb 3.2 oz)   01/07/20 108.4 kg (239 lb)                  Patient Active Problem List   Diagnosis     S/P nasal septoplasty     Rhinitis, allergic     Perennial allergic rhinitis     ACP (advance care planning)     Allergic rhinitis     HTN (hypertension)     Hypercholesterolemia     Hypothyroidism     Pain in joint, lower leg     Nephrolithiasis     Obesity     Rosacea     Asymptomatic varicose veins     Mild intermittent  extrinsic asthma without complication     Past Surgical History:   Procedure Laterality Date     COLONOSCOPY      10/13/08,which was normal.  Next colonoscopy due in 2018.     HYSTERECTOMY VAGINAL      05, anterior repair with posterior perineorrhaphy and vaginal vault suspension     LAPAROSCOPIC TUBAL LIGATION      No Comments Provided     OTHER SURGICAL HISTORY      ,LITHOTRIPSY, with stent placement for right ureteral stone     OTHER SURGICAL HISTORY      05,[id number],REMOVE,ureteral Stent removal     SEPTOPLASTY      2012,with turbinate reduction;  Dr. England     TONSILLECTOMY      No Comments Provided       Social History     Tobacco Use     Smoking status: Never Smoker     Smokeless tobacco: Never Used   Substance Use Topics     Alcohol use: Yes     Alcohol/week: 0.0 standard drinks     Comment: Alcoholic Drinks/day: infrequent social     Family History   Problem Relation Age of Onset     Heart Disease Mother 49        Heart Disease,MI     Hypertension Mother         Hypertension     Arthritis Father         Arthritis,Rheumatoid arthritis     Heart Disease Father         Heart Disease, MI     Hypertension Father         Hypertension     Other - See Comments Maternal Grandmother          of blood clot at childbirth.     Hypertension Sister         Hypertension     Hypertension Brother         Hypertension     Other - See Comments Other         Did have a blood clot     Other - See Comments Daughter         GI Disease,Ulcerative colitis diagnosed 2009     Breast Cancer No family hx of         Cancer-breast         Current Outpatient Medications   Medication Sig Dispense Refill     albuterol (PROAIR HFA/PROVENTIL HFA/VENTOLIN HFA) 108 (90 Base) MCG/ACT inhaler Inhale 2 puffs into the lungs every 4 hours as needed Inhale 1-2 puff 1 Inhaler 11     aspirin EC 81 MG EC tablet Take 81 mg by mouth daily with food       budesonide (PULMICORT) 0.5 MG/2ML neb solution SPRAY 2MLS IN NOSTRIL TWICE  "DAILY MAKE 240CC MICHAEL MED SINUS IRRI. MIX 2ML VIAL OF BUDESONIDE 0.5MG RINSE 1-2 X DAILY AS NEEDED X 2-4 WEEKS 360 mL 1     cetirizine (ZYRTEC) 10 MG tablet Take 10 mg by mouth daily       Cholecalciferol (VITAMIN D PO) Take 1 tablet by mouth daily        fluticasone (FLONASE) 50 MCG/ACT nasal spray Spray 2 sprays into both nostrils daily 18.2 mL 11     Krill Oil 500 MG CAPS Take 1 capsule by mouth daily       levothyroxine (SYNTHROID/LEVOTHROID) 100 MCG tablet TAKE 1 TABLET(100 MCG) BY MOUTH EVERY MORNING BEFORE BREAKFAST 90 tablet 3     lisinopril (ZESTRIL) 20 MG tablet Take 1 tablet (20 mg) by mouth daily 90 tablet 3     metroNIDAZOLE (METROGEL) 0.75 % external gel Apply topically 2 times daily 45 g 3     montelukast (SINGULAIR) 10 MG tablet Take 1 tablet (10 mg) by mouth At Bedtime 30 tablet 11     Multiple Vitamin (MULTIVITAMIN) per tablet Take 1 tablet by mouth daily. Food       other medical supplies Shingrix.  Diagnosis:  Z23. 1 each 1     pravastatin (PRAVACHOL) 40 MG tablet Take 1 tablet (40 mg) by mouth At Bedtime 90 tablet 3     Allergies   Allergen Reactions     Azithromycin Hives     Zithromax           Review of Systems  Constitutional, HEENT, cardiovascular, pulmonary, gi and gu systems are negative, except as otherwise noted.    OBJECTIVE:   /74 (BP Location: Right arm, Patient Position: Sitting, Cuff Size: Adult Large)   Pulse 73   Temp 98.2  F (36.8  C) (Tympanic)   Resp 20   Ht 1.613 m (5' 3.5\")   Wt 107.3 kg (236 lb 8 oz)   LMP  (LMP Unknown)   SpO2 96%   Breastfeeding No   BMI 41.24 kg/m   Estimated body mass index is 41.24 kg/m  as calculated from the following:    Height as of this encounter: 1.613 m (5' 3.5\").    Weight as of this encounter: 107.3 kg (236 lb 8 oz).  Physical Exam  GENERAL APPEARANCE: healthy, alert and no distress  EYES: Eyes grossly normal to inspection, PERRL and conjunctivae and sclerae normal  HENT: ear canals and TM's normal, nose and mouth without ulcers " or lesions, oropharynx clear and oral mucous membranes moist  NECK: no adenopathy, no asymmetry, masses, or scars and thyroid normal to palpation  RESP: lungs clear to auscultation - no rales, rhonchi or wheezes  BREAST: normal without masses, tenderness or nipple discharge and no palpable axillary masses or adenopathy  CV: regular rate and rhythm, normal S1 S2, no S3 or S4, no murmur, click or rub, no peripheral edema and peripheral pulses strong  ABDOMEN: soft, nontender, no hepatosplenomegaly, no masses and bowel sounds normal  MS: no musculoskeletal defects are noted and gait is age appropriate without ataxia  SKIN: no suspicious lesions or rashes  NEURO: Normal strength and tone, sensory exam grossly normal, mentation intact and speech normal  PSYCH: mentation appears normal and affect normal/bright    Diagnostic Test Results:  Labs reviewed in Epic    ASSESSMENT / PLAN:       ICD-10-CM    1. Encounter for Medicare annual wellness exam  Z00.00    2. Screen for colon cancer  Z12.11 ABSTRACT COLOGUARD-NO CHARGE   3. Screening for AAA (abdominal aortic aneurysm)  Z13.6 US Aorta Medicare AAA Screening   4. Postmenopause  Z78.0 DX Hip/Pelvis/Spine   5. Need for vaccination for pneumococcus  Z23 GH IMM-  PNEUMOCOCCAL CONJ VACCINE 13 VALENT IM   6. Need for shingles vaccine  Z23 other medical supplies   7. Screening for deficiency anemia  Z13.0 CBC with platelets differential     CBC with platelets differential   8. Secondary hypertension  I15.9 lisinopril (ZESTRIL) 20 MG tablet     Comprehensive metabolic panel     Comprehensive metabolic panel   9. Hypercholesterolemia  E78.00 pravastatin (PRAVACHOL) 40 MG tablet     Comprehensive metabolic panel     Lipid Profile     Comprehensive metabolic panel     Lipid Profile   10. Hypothyroidism, unspecified type  E03.9 levothyroxine (SYNTHROID/LEVOTHROID) 100 MCG tablet     TSH Reflex GH     TSH Reflex GH   11. Perennial allergic rhinitis  J30.89 fluticasone (FLONASE) 50  "MCG/ACT nasal spray     albuterol (PROAIR HFA/PROVENTIL HFA/VENTOLIN HFA) 108 (90 Base) MCG/ACT inhaler     1.  Mammogram last completed 11/4/2019.  DEXA ordered as below.  Colon cancer screening via cologuard ordered as noted below.  Pap Smear deferred since she is s/p hysterectomy.  Tdap last completed 10/26/2017.  Abdominal aortic aneurysm screening as below.  2.  cologuard ordered.  3.  ultrasound to screen for abdominal aortic aneurysm ordered.  4.  DEXA ordered.  5.  Prevnar updated today.  6.  Prescription given to obtain shingrix at outside pharmacy.  7.  Complete Blood Count today as above.  8.  Blood pressure stable today.  Labs as above.  Lisinopril refilled as above.  9.  Pravastatin refilled.  Labs as above.  10.  Levothyroxine refilled.  TSH as above.    11.  Fluticasone refilled.  Albuterol refilled.    COUNSELING:  Reviewed preventive health counseling, as reflected in patient instructions       Regular exercise       Healthy diet/nutrition       Vision screening       Dental care       Immunizations    Vaccinated for: Pneumococcal             Osteoporosis Prevention/Bone Health       Colon cancer screening       Advanced Planning     Estimated body mass index is 41.24 kg/m  as calculated from the following:    Height as of this encounter: 1.613 m (5' 3.5\").    Weight as of this encounter: 107.3 kg (236 lb 8 oz).    Weight management plan: Discussed healthy diet and exercise guidelines    She reports that she has never smoked. She has never used smokeless tobacco.      Appropriate preventive services were discussed with this patient, including applicable screening as appropriate for cardiovascular disease, diabetes, osteopenia/osteoporosis, and glaucoma.  As appropriate for age/gender, discussed screening for colorectal cancer, prostate cancer, breast cancer, and cervical cancer. Checklist reviewing preventive services available has been given to the patient.    Reviewed patients plan of care and " provided an AVS. The Basic Care Plan (routine screening as documented in Health Maintenance) for Laura meets the Care Plan requirement. This Care Plan has been established and reviewed with the Patient.    Counseling Resources:  ATP IV Guidelines  Pooled Cohorts Equation Calculator  Breast Cancer Risk Calculator  Breast Cancer: Medication to Reduce Risk  FRAX Risk Assessment  ICSI Preventive Guidelines  Dietary Guidelines for Americans, 2010  USDA's MyPlate  ASA Prophylaxis  Lung CA Screening    Gloria Mcnally MD  [facility]    Identified Health Risks:

## 2020-08-28 ASSESSMENT — ASTHMA QUESTIONNAIRES: ACT_TOTALSCORE: 25

## 2020-10-05 LAB — COLOGUARD-ABSTRACT: POSITIVE

## 2020-10-13 ENCOUNTER — TELEPHONE (OUTPATIENT)
Dept: FAMILY MEDICINE | Facility: OTHER | Age: 65
End: 2020-10-13

## 2020-10-13 DIAGNOSIS — Z12.11 SCREEN FOR COLON CANCER: Primary | ICD-10-CM

## 2020-10-13 DIAGNOSIS — R19.5 POSITIVE COLORECTAL CANCER SCREENING USING COLOGUARD TEST: ICD-10-CM

## 2020-10-13 NOTE — TELEPHONE ENCOUNTER
Notified patient of response per Gloria Mcnally MD and she verbally understood.  Nellie Springer LPN............10/13/2020 1:48 PM

## 2020-10-13 NOTE — TELEPHONE ENCOUNTER
Please call patient - her Cologuard returned abnormal, which means that she should have a colonoscopy to evaluate further.  I have put an order through for this to be scheduled.  Gloria Mcnally MD

## 2020-10-19 DIAGNOSIS — Z00.00 HEALTHCARE MAINTENANCE: Primary | ICD-10-CM

## 2020-10-19 RX ORDER — POLYETHYLENE GLYCOL 3350, SODIUM CHLORIDE, SODIUM BICARBONATE, POTASSIUM CHLORIDE 420; 11.2; 5.72; 1.48 G/4L; G/4L; G/4L; G/4L
4000 POWDER, FOR SOLUTION ORAL ONCE
Qty: 4000 ML | Refills: 0 | Status: SHIPPED | OUTPATIENT
Start: 2020-10-19 | End: 2020-10-19

## 2020-10-19 RX ORDER — BISACODYL 5 MG/1
TABLET, DELAYED RELEASE ORAL
Qty: 2 TABLET | Refills: 0 | Status: ON HOLD | OUTPATIENT
Start: 2020-10-19 | End: 2020-10-26

## 2020-10-19 NOTE — TELEPHONE ENCOUNTER
Screening Questions for the Scheduling of Screening Colonoscopies   (If Colonoscopy is diagnostic, Provider should review the chart before scheduling.)  Are you younger than 50 or older than 80?   NO   Do you take aspirin or fish oil?  YES  - BOTH  (if yes, tell patient to stop 1 week prior to Colonoscopy)  Do you take warfarin (Coumadin), clopidogrel (Plavix), apixaban (Eliquis), dabigatram (Pradaxa), rivaroxaban (Xarelto) or any blood thinner? NO   Do you use oxygen at home?  NO   Do you have kidney disease? NO   Are you on dialysis? NO   Have you had a stroke or heart attack in the last year? NO   Have you had a stent in your heart or any blood vessel in the last year? NO   Have you had a transplant of any organ? NO  Have you had a colonoscopy or upper endoscopy (EGD) before? YES          When?  2008  Date of scheduled Colonoscopy. 10/26/2020  Provider VISHAL   Pharmacy [facility]

## 2020-10-20 ENCOUNTER — HOSPITAL ENCOUNTER (OUTPATIENT)
Dept: ULTRASOUND IMAGING | Facility: OTHER | Age: 65
End: 2020-10-20
Attending: FAMILY MEDICINE
Payer: MEDICARE

## 2020-10-20 ENCOUNTER — HOSPITAL ENCOUNTER (OUTPATIENT)
Dept: BONE DENSITY | Facility: OTHER | Age: 65
End: 2020-10-20
Attending: FAMILY MEDICINE
Payer: MEDICARE

## 2020-10-20 DIAGNOSIS — Z13.6 SCREENING FOR AAA (ABDOMINAL AORTIC ANEURYSM): ICD-10-CM

## 2020-10-20 DIAGNOSIS — Z78.0 POSTMENOPAUSE: ICD-10-CM

## 2020-10-20 PROBLEM — R19.5 POSITIVE FIT (FECAL IMMUNOCHEMICAL TEST): Status: ACTIVE | Noted: 2020-10-20

## 2020-10-20 PROCEDURE — 76706 US ABDL AORTA SCREEN AAA: CPT

## 2020-10-20 PROCEDURE — 77080 DXA BONE DENSITY AXIAL: CPT

## 2020-10-22 ENCOUNTER — ALLIED HEALTH/NURSE VISIT (OUTPATIENT)
Dept: FAMILY MEDICINE | Facility: OTHER | Age: 65
End: 2020-10-22
Attending: SURGERY
Payer: MEDICARE

## 2020-10-22 DIAGNOSIS — Z00.00 HEALTHCARE MAINTENANCE: ICD-10-CM

## 2020-10-22 PROCEDURE — 99207 PR NO CHARGE NURSE ONLY: CPT

## 2020-10-22 PROCEDURE — C9803 HOPD COVID-19 SPEC COLLECT: HCPCS

## 2020-10-22 PROCEDURE — U0003 INFECTIOUS AGENT DETECTION BY NUCLEIC ACID (DNA OR RNA); SEVERE ACUTE RESPIRATORY SYNDROME CORONAVIRUS 2 (SARS-COV-2) (CORONAVIRUS DISEASE [COVID-19]), AMPLIFIED PROBE TECHNIQUE, MAKING USE OF HIGH THROUGHPUT TECHNOLOGIES AS DESCRIBED BY CMS-2020-01-R: HCPCS | Mod: ZL | Performed by: SURGERY

## 2020-10-22 NOTE — PROGRESS NOTES
Patient swabbed for COVID-19 testing. For covid rule out.  Darlene Palma LPN on 10/22/2020 at 8:19 AM

## 2020-10-23 LAB
SARS-COV-2 RNA SPEC QL NAA+PROBE: NOT DETECTED
SPECIMEN SOURCE: NORMAL

## 2020-10-26 ENCOUNTER — ANESTHESIA EVENT (OUTPATIENT)
Dept: SURGERY | Facility: OTHER | Age: 65
End: 2020-10-26
Payer: MEDICARE

## 2020-10-26 ENCOUNTER — ANESTHESIA (OUTPATIENT)
Dept: SURGERY | Facility: OTHER | Age: 65
End: 2020-10-26
Payer: MEDICARE

## 2020-10-26 ENCOUNTER — HOSPITAL ENCOUNTER (OUTPATIENT)
Facility: OTHER | Age: 65
Discharge: HOME | End: 2020-10-26
Attending: SURGERY | Admitting: SURGERY
Payer: MEDICARE

## 2020-10-26 VITALS
BODY MASS INDEX: 41.24 KG/M2 | HEART RATE: 66 BPM | RESPIRATION RATE: 14 BRPM | OXYGEN SATURATION: 100 % | TEMPERATURE: 98.6 F | WEIGHT: 236.5 LBS | DIASTOLIC BLOOD PRESSURE: 64 MMHG | SYSTOLIC BLOOD PRESSURE: 130 MMHG

## 2020-10-26 PROBLEM — K63.5 COLON POLYPS: Status: ACTIVE | Noted: 2020-10-26

## 2020-10-26 PROCEDURE — 999N000010 HC STATISTIC ANES STAT CODE-CRNA PER MINUTE: Performed by: SURGERY

## 2020-10-26 PROCEDURE — 45384 COLONOSCOPY W/LESION REMOVAL: CPT | Performed by: SURGERY

## 2020-10-26 PROCEDURE — 250N000009 HC RX 250: Performed by: NURSE ANESTHETIST, CERTIFIED REGISTERED

## 2020-10-26 PROCEDURE — 45380 COLONOSCOPY AND BIOPSY: CPT | Performed by: SURGERY

## 2020-10-26 PROCEDURE — 250N000009 HC RX 250: Performed by: SURGERY

## 2020-10-26 PROCEDURE — 45384 COLONOSCOPY W/LESION REMOVAL: CPT | Performed by: NURSE ANESTHETIST, CERTIFIED REGISTERED

## 2020-10-26 PROCEDURE — 88305 TISSUE EXAM BY PATHOLOGIST: CPT

## 2020-10-26 PROCEDURE — 258N000003 HC RX IP 258 OP 636: Performed by: SURGERY

## 2020-10-26 PROCEDURE — 250N000011 HC RX IP 250 OP 636: Performed by: NURSE ANESTHETIST, CERTIFIED REGISTERED

## 2020-10-26 PROCEDURE — 250N000013 HC RX MED GY IP 250 OP 250 PS 637: Mod: GY | Performed by: SURGERY

## 2020-10-26 RX ORDER — PROPOFOL 10 MG/ML
INJECTION, EMULSION INTRAVENOUS PRN
Status: DISCONTINUED | OUTPATIENT
Start: 2020-10-26 | End: 2020-10-26

## 2020-10-26 RX ORDER — NALOXONE HYDROCHLORIDE 0.4 MG/ML
.1-.4 INJECTION, SOLUTION INTRAMUSCULAR; INTRAVENOUS; SUBCUTANEOUS
Status: DISCONTINUED | OUTPATIENT
Start: 2020-10-26 | End: 2020-10-26 | Stop reason: HOSPADM

## 2020-10-26 RX ORDER — SODIUM CHLORIDE, SODIUM LACTATE, POTASSIUM CHLORIDE, CALCIUM CHLORIDE 600; 310; 30; 20 MG/100ML; MG/100ML; MG/100ML; MG/100ML
INJECTION, SOLUTION INTRAVENOUS CONTINUOUS
Status: DISCONTINUED | OUTPATIENT
Start: 2020-10-26 | End: 2020-10-26 | Stop reason: HOSPADM

## 2020-10-26 RX ORDER — FLUMAZENIL 0.1 MG/ML
0.2 INJECTION, SOLUTION INTRAVENOUS
Status: DISCONTINUED | OUTPATIENT
Start: 2020-10-26 | End: 2020-10-26 | Stop reason: HOSPADM

## 2020-10-26 RX ORDER — SIMETHICONE 40MG/0.6ML
SUSPENSION, DROPS(FINAL DOSAGE FORM)(ML) ORAL PRN
Status: DISCONTINUED | OUTPATIENT
Start: 2020-10-26 | End: 2020-10-26 | Stop reason: HOSPADM

## 2020-10-26 RX ORDER — LIDOCAINE 40 MG/G
CREAM TOPICAL
Status: DISCONTINUED | OUTPATIENT
Start: 2020-10-26 | End: 2020-10-26 | Stop reason: HOSPADM

## 2020-10-26 RX ORDER — ONDANSETRON 2 MG/ML
4 INJECTION INTRAMUSCULAR; INTRAVENOUS
Status: DISCONTINUED | OUTPATIENT
Start: 2020-10-26 | End: 2020-10-26 | Stop reason: HOSPADM

## 2020-10-26 RX ORDER — PROPOFOL 10 MG/ML
INJECTION, EMULSION INTRAVENOUS CONTINUOUS PRN
Status: DISCONTINUED | OUTPATIENT
Start: 2020-10-26 | End: 2020-10-26

## 2020-10-26 RX ORDER — LIDOCAINE HYDROCHLORIDE 20 MG/ML
INJECTION, SOLUTION INFILTRATION; PERINEURAL PRN
Status: DISCONTINUED | OUTPATIENT
Start: 2020-10-26 | End: 2020-10-26

## 2020-10-26 RX ADMIN — SODIUM CHLORIDE, POTASSIUM CHLORIDE, SODIUM LACTATE AND CALCIUM CHLORIDE: 600; 310; 30; 20 INJECTION, SOLUTION INTRAVENOUS at 10:24

## 2020-10-26 RX ADMIN — PROPOFOL 30 MG: 10 INJECTION, EMULSION INTRAVENOUS at 10:53

## 2020-10-26 RX ADMIN — PROPOFOL 100 MG: 10 INJECTION, EMULSION INTRAVENOUS at 10:31

## 2020-10-26 RX ADMIN — PROPOFOL 200 MCG/KG/MIN: 10 INJECTION, EMULSION INTRAVENOUS at 10:31

## 2020-10-26 RX ADMIN — LIDOCAINE HYDROCHLORIDE 100 MG: 20 INJECTION, SOLUTION INFILTRATION; PERINEURAL at 10:31

## 2020-10-26 NOTE — ANESTHESIA CARE TRANSFER NOTE
Patient: Laura Romero    Procedure(s):  COLONOSCOPY, WITH POLYPECTOMY AND BIOPSY    Diagnosis: Positive colorectal cancer screening using Cologuard test [R19.5]  Diagnosis Additional Information: No value filed.    Anesthesia Type:   MAC     Note:  Airway :Room Air  Patient transferred to:Phase II  Comments: Patient's VSS. Spontaneous respirations. Patient awake and oriented. IV patent. Report to RN.Handoff Report: Identifed the Patient, Identified the Reponsible Provider, Reviewed the pertinent medical history, Discussed the surgical course, Reviewed Intra-OP anesthesia mangement and issues during anesthesia, Set expectations for post-procedure period and Allowed opportunity for questions and acknowledgement of understanding      Vitals: (Last set prior to Anesthesia Care Transfer)    CRNA VITALS  10/26/2020 1029 - 10/26/2020 1059      10/26/2020             Resp Rate (set):  10                Electronically Signed By: ALICIA Alejandra CRNA  October 26, 2020  10:59 AM

## 2020-10-26 NOTE — ANESTHESIA POSTPROCEDURE EVALUATION
Patient: Laura Romero    Procedure(s):  COLONOSCOPY, WITH POLYPECTOMY AND BIOPSY    Diagnosis:Positive colorectal cancer screening using Cologuard test [R19.5]  Diagnosis Additional Information: No value filed.    Anesthesia Type:  MAC    Note:  Anesthesia Post Evaluation    Patient location during evaluation: Bedside  Patient participation: Able to fully participate in evaluation  Level of consciousness: awake and alert  Pain management: adequate  Airway patency: patent  Cardiovascular status: acceptable  Respiratory status: acceptable  Hydration status: acceptable  PONV: none     Anesthetic complications: None          Last vitals:  Vitals:    10/26/20 1100 10/26/20 1115 10/26/20 1130   BP: 101/67 118/72 130/64   Pulse:  67 66   Resp:      Temp: 98.6  F (37  C)     SpO2: 96% 96% 100%         Electronically Signed By: ALICIA Guardado CRNA  October 26, 2020  12:25 PM

## 2020-10-26 NOTE — ANESTHESIA PREPROCEDURE EVALUATION
Anesthesia Pre-Procedure Evaluation    Patient: Laura Romero   MRN: [id number] : 1955          Preoperative Diagnosis: Positive colorectal cancer screening using Cologuard test [R19.5]    Procedure(s):  COLONOSCOPY    Past Medical History:   Diagnosis Date     Bacteremia      ,Negative coag testing     Calculus of kidney     age 19 and age 50     Chronic sinusitis     2011     Deviated nasal septum     8/3/2012     Deviated nasal septum     sever turbinate hypertophy and superior septal deviation to right with recurrent sinusitis     Gastro-esophageal reflux disease without esophagitis     2009     Personal history of other medical treatment (CODE)     rectocele with cervical prolapse     Phlebitis and thrombophlebitis     11, after varicose vein ablation     Phlebitis and thrombophlebitis of lower extremities, unspecified (CODE)     2011,Greater saphenous vein left lower extremity     Pneumonia     2012     Past Surgical History:   Procedure Laterality Date     COLONOSCOPY      10/13/08,which was normal.  Next colonoscopy due in 2018.     HYSTERECTOMY VAGINAL      05, anterior repair with posterior perineorrhaphy and vaginal vault suspension     LAPAROSCOPIC TUBAL LIGATION      No Comments Provided     OTHER SURGICAL HISTORY      ,LITHOTRIPSY, with stent placement for right ureteral stone     OTHER SURGICAL HISTORY      05,[id number],REMOVE,ureteral Stent removal     SEPTOPLASTY      2012,with turbinate reduction;  Dr. Egnland     TONSILLECTOMY      No Comments Provided       Anesthesia Evaluation     . Pt has had prior anesthetic.     No history of anesthetic complications          ROS/MED HX    ENT/Pulmonary: Comment: S/P nasal septoplasty    (+)allergic rhinitis, Intermittent asthma , . .    Neurologic:  - neg neurologic ROS     Cardiovascular:     (+) Dyslipidemia, hypertension----. : . . . :. .       METS/Exercise Tolerance:  >4 METS   Hematologic:  - neg  "hematologic  ROS       Musculoskeletal:  - neg musculoskeletal ROS       GI/Hepatic:     (+) GERD Asymptomatic on medication,       Renal/Genitourinary:     (+) Nephrolithiasis ,       Endo:     (+) thyroid problem hypothyroidism, Obesity, .      Psychiatric:  - neg psychiatric ROS       Infectious Disease:  - neg infectious disease ROS       Malignancy:      - no malignancy   Other:    - neg other ROS                      Physical Exam  Normal systems: cardiovascular, pulmonary and dental    Airway   Mallampati: II  TM distance: >3 FB  Neck ROM: full    Dental     Cardiovascular   Rhythm and rate: regular and normal      Pulmonary             Lab Results   Component Value Date    WBC 6.6 08/27/2020    HGB 14.2 08/27/2020    HCT 45.6 08/27/2020     08/27/2020     08/27/2020    POTASSIUM 4.2 08/27/2020    CHLORIDE 102 08/27/2020    CO2 32 (H) 08/27/2020    BUN 16 08/27/2020    CR 0.74 08/27/2020    GLC 94 08/27/2020    MACIE 9.5 08/27/2020    ALBUMIN 4.5 08/27/2020    PROTTOTAL 7.1 08/27/2020    ALT 16 08/27/2020    AST 17 08/27/2020    ALKPHOS 60 08/27/2020    BILITOTAL 0.7 08/27/2020       Preop Vitals  BP Readings from Last 3 Encounters:   08/27/20 116/74   01/13/20 114/64   01/07/20 120/62    Pulse Readings from Last 3 Encounters:   08/27/20 73   01/13/20 68   01/07/20 70      Resp Readings from Last 3 Encounters:   08/27/20 20   01/13/20 16   01/07/20 16    SpO2 Readings from Last 3 Encounters:   08/27/20 96%   12/18/19 98%   10/03/19 97%      Temp Readings from Last 1 Encounters:   08/27/20 98.2  F (36.8  C) (Tympanic)    Ht Readings from Last 1 Encounters:   08/27/20 1.613 m (5' 3.5\")      Wt Readings from Last 1 Encounters:   08/27/20 107.3 kg (236 lb 8 oz)    Estimated body mass index is 41.24 kg/m  as calculated from the following:    Height as of 8/27/20: 1.613 m (5' 3.5\").    Weight as of 8/27/20: 107.3 kg (236 lb 8 oz).       Anesthesia Plan      History & Physical Review      ASA Status:  2 . "    NPO Status:  > 8 hours    Plan for MAC with Intravenous induction. Maintenance will be Balanced.      Risks, benefits and alternatives discussed and would like to proceed. General anesthesia ok if indicated.           Postoperative Care      Consents  Anesthetic plan, risks, benefits and alternatives discussed with:  Patient.  Use of blood products discussed: No .   .                 ALICIA Guardado CRNA

## 2020-10-26 NOTE — OP NOTE
PROCEDURE NOTE    DATE OF SERVICE: 10/26/2020    SURGEON: Justin Garcia MD    PRE-OP DIAGNOSIS:      Positive FIT      POST-OP DIAGNOSIS:  Same  Polyps at  Cecum, 20 cm and rectum    PROCEDURE:   Colonoscopy with hot biopsy      ANESTHESIA:  SUDHIR Israel CRNA    INDICATION FOR THE PROCEDURE: The patient is a 65 year old female with positive Cologuard . The patient has no other complaints  . After explaining the risks to include bleeding, perforation, potential inability toreach the cecum, the patient wished to proceed.    PROCEDURE:After adequate sedation, the patient was in the left lateral decubitus position.  Rectal exam was performed.  There was normal tone and no palpable masses .  The colonoscope was introduced into the rectum and advanced to the cecum with Mild difficulty.  The patient's prep was excellent.  The terminal cecum was reached.  The cecum, ascending, transverse, descending and sigmoid colon was with diminutive polyps at cecum, 20 cm and rectum that were all hot biopsied and destroyed .  The scope was retroflexed in the rectum.  The rectum was otherwise unremarkable  .  The scope was straightened and removed.  The patient tolerated the procedure well.     ESTIMATED BLOOD LOSS: none    COMPLICATIONS:  None    TISSUE REMOVED:  Yes    RECOMMEND:      Follow-up pending pathology      Justin Garcia MD FACS

## 2020-10-26 NOTE — H&P
History and Physical    CHIEF COMPLAINT / REASON FOR PROCEDURE:  Positive Cologuard    PERTINENT HISTORY   Patient is a 65 year old female who presents today for colonoscopy for positive Cologuard.   Last colonoscopy 2008.  Patient has no complaints.    Past Medical History:   Diagnosis Date     Bacteremia      12/04,Negative coag testing     Calculus of kidney     age 19 and age 50     Chronic sinusitis     5/20/2011     Deviated nasal septum     8/3/2012     Deviated nasal septum     sever turbinate hypertophy and superior septal deviation to right with recurrent sinusitis     Gastro-esophageal reflux disease without esophagitis     8/31/2009     Personal history of other medical treatment (CODE)     rectocele with cervical prolapse     Phlebitis and thrombophlebitis     1/6/11, after varicose vein ablation     Phlebitis and thrombophlebitis of lower extremities, unspecified (CODE)     1/6/2011,Greater saphenous vein left lower extremity     Pneumonia     7/27/2012     Past Surgical History:   Procedure Laterality Date     COLONOSCOPY      10/13/08,which was normal.  Next colonoscopy due in 2018.     HYSTERECTOMY VAGINAL      04/27/05, anterior repair with posterior perineorrhaphy and vaginal vault suspension     LAPAROSCOPIC TUBAL LIGATION      No Comments Provided     OTHER SURGICAL HISTORY      ,LITHOTRIPSY, with stent placement for right ureteral stone     OTHER SURGICAL HISTORY      8/11/05,[id number],REMOVE,ureteral Stent removal     SEPTOPLASTY      8/2012,with turbinate reduction;  Dr. England     TONSILLECTOMY      No Comments Provided       Bleeding tendencies:  No    ALLERGIES/SENSITIVITIES:   Allergies   Allergen Reactions     Azithromycin Hives     Zithromax          CURRENT MEDICATIONS:    Prior to Admission medications    Medication Sig Start Date End Date Taking? Authorizing Provider   budesonide (PULMICORT) 0.5 MG/2ML neb solution SPRAY 2MLS IN NOSTRIL TWICE DAILY MAKE 240CC MICHAEL MED SINUS  IRRI. MIX 2ML VIAL OF BUDESONIDE 0.5MG RINSE 1-2 X DAILY AS NEEDED X 2-4 WEEKS  Patient taking differently: daily as needed  4/2/20  Yes Lorelei Guzman PA-C   cetirizine (ZYRTEC) 10 MG tablet Take 10 mg by mouth daily 12/12/12  Yes Reported, Patient   Cholecalciferol (VITAMIN D PO) Take 1 tablet by mouth daily    Yes Reported, Patient   fluticasone (FLONASE) 50 MCG/ACT nasal spray Spray 2 sprays into both nostrils daily 8/27/20  Yes Gloria Mcnally MD   levothyroxine (SYNTHROID/LEVOTHROID) 100 MCG tablet TAKE 1 TABLET(100 MCG) BY MOUTH EVERY MORNING BEFORE BREAKFAST 8/27/20  Yes Gloria Mcnally MD   lisinopril (ZESTRIL) 20 MG tablet Take 1 tablet (20 mg) by mouth daily 8/27/20  Yes Gloria Mcnally MD   metroNIDAZOLE (METROGEL) 0.75 % external gel Apply topically 2 times daily 2/27/20  Yes Gloria Mcnally MD   Multiple Vitamin (MULTIVITAMIN) per tablet Take 1 tablet by mouth daily. Food 6/20/12  Yes Reported, Patient   pravastatin (PRAVACHOL) 40 MG tablet Take 1 tablet (40 mg) by mouth At Bedtime 8/27/20  Yes Gloria Mcnally MD   albuterol (PROAIR HFA/PROVENTIL HFA/VENTOLIN HFA) 108 (90 Base) MCG/ACT inhaler Inhale 2 puffs into the lungs every 4 hours as needed Inhale 1-2 puff 8/27/20   Gloria Mcnally MD   aspirin EC 81 MG EC tablet Take 81 mg by mouth daily with food 9/29/10   Reported, Patient   Krill Oil 500 MG CAPS Take 1 capsule by mouth daily 2/28/18   Adelia Burgess MD   other medical supplies [employer].  Diagnosis:  Z23. 8/27/20   Gloria Mcnally MD       Physical Exam:  BP (!) 144/93   Pulse 82   Temp 99.1  F (37.3  C)   Resp 14   Wt 107.3 kg (236 lb 8 oz)   LMP  (LMP Unknown)   SpO2 97%   BMI 41.24 kg/m    EXAM:  Chest/Respiratory Exam: Normal - Clear to auscultation without rales, rhonchi, or wheezing.  Cardiovascular Exam: normal, regular rate and rhythm        PLAN: COLONOSCOPY .  Patient understands risks of bleeding,  perforation, potential inability to reach cecum, aspiration and wishes to proceed. MAC needed for BMI.

## 2020-10-26 NOTE — DISCHARGE INSTRUCTIONS
[facility]  Adult Discharge Orders & Instructions    ________________________________________________________________          For 12 hours after surgery  1. Get plenty of rest.  A responsible adult must stay with you for at least 12 hours after you leave the hospital.   2. You may feel lightheaded.  IF so, sit for a few minutes before standing.  Have someone help you get up.   3. You may have a slight fever. Call the doctor if your fever is over 101 F (38.3 C) (taken under the tongue) or lasts longer than 24 hours.  4. You may have a dry mouth, a sore throat, muscle aches or trouble sleeping.  These should go away after 24 hours.  5. Do not make important or legal decisions.  6.   Do not drive or use heavy equipment.  If you have weakness or tingling, don't drive or use heavy equipment until this feeling goes away.    To contact a doctor, call   [phone]_______________________

## 2020-10-28 PROBLEM — Z86.0101 H/O ADENOMATOUS POLYP OF COLON: Status: ACTIVE | Noted: 2020-10-26

## 2020-10-28 PROBLEM — R19.5 POSITIVE FIT (FECAL IMMUNOCHEMICAL TEST): Status: RESOLVED | Noted: 2020-10-20 | Resolved: 2020-10-28

## 2020-12-03 ENCOUNTER — OFFICE VISIT (OUTPATIENT)
Dept: FAMILY MEDICINE | Facility: OTHER | Age: 65
End: 2020-12-03
Attending: FAMILY MEDICINE
Payer: COMMERCIAL

## 2020-12-03 VITALS
BODY MASS INDEX: 40.8 KG/M2 | OXYGEN SATURATION: 97 % | WEIGHT: 239 LBS | RESPIRATION RATE: 18 BRPM | TEMPERATURE: 98.5 F | SYSTOLIC BLOOD PRESSURE: 128 MMHG | HEIGHT: 64 IN | HEART RATE: 71 BPM | DIASTOLIC BLOOD PRESSURE: 80 MMHG

## 2020-12-03 DIAGNOSIS — J01.01 ACUTE RECURRENT MAXILLARY SINUSITIS: Primary | ICD-10-CM

## 2020-12-03 PROCEDURE — G0463 HOSPITAL OUTPT CLINIC VISIT: HCPCS

## 2020-12-03 PROCEDURE — 99213 OFFICE O/P EST LOW 20 MIN: CPT | Performed by: FAMILY MEDICINE

## 2020-12-03 RX ORDER — PREDNISONE 20 MG/1
20 TABLET ORAL 2 TIMES DAILY
Qty: 10 TABLET | Refills: 0 | Status: SHIPPED | OUTPATIENT
Start: 2020-12-03 | End: 2020-12-08

## 2020-12-03 RX ORDER — CEFDINIR 300 MG/1
300 CAPSULE ORAL 2 TIMES DAILY
Qty: 14 CAPSULE | Refills: 0 | Status: SHIPPED | OUTPATIENT
Start: 2020-12-03 | End: 2021-11-05

## 2020-12-03 ASSESSMENT — ENCOUNTER SYMPTOMS
SHORTNESS OF BREATH: 0
CHILLS: 0
COUGH: 0
NERVOUS/ANXIOUS: 0
FEVER: 0

## 2020-12-03 ASSESSMENT — MIFFLIN-ST. JEOR: SCORE: 1606.16

## 2020-12-03 ASSESSMENT — PAIN SCALES - GENERAL: PAINLEVEL: MILD PAIN (2)

## 2020-12-03 NOTE — NURSING NOTE
Patient presents to clinic experiencing sinus pressure, headaches, dizziness and congestion.  Medication Reconciliation: complete    Nellie Springer LPN

## 2020-12-03 NOTE — PROGRESS NOTES
SUBJECTIVE:   Nursing Notes:   Nellie Springer LPN  12/3/2020  8:58 AM  Sign at exiting of workspace  Patient presents to clinic experiencing sinus pressure, headaches, dizziness and congestion.  Medication Reconciliation: complete    Nellie Springer LPN        Laura Romero is a 65 year old female who presents to clinic today for a complaint of of sinus pressure, headache, dizziness and nasal congestion.  Started at the end of September.  When it snowed, her symptoms were better.  When it melted, she raked in early November.  Her symptoms have been worse since then.  Had a lot of sinus headaches last weeks.  Was needing to take a lot of advil last week.  Never had a fever.  She often gets sinus symptoms in the fall.  Gets very plugged in her ears.  Her symptoms are worse on in her right eye area.  Eyes are very watery.  No sneezing, but this is typical for her with her allergies.  Headaches are better this week.  No cough.  She does have some postnasal drip.  Using a humidfier at home.  Using netipot and budesonide rinse.  Started this a couple of weeks ago.  Using zyrtec daily and her nasal steroid spray.  She has many allergies to both indoor and outdoor allergies.  She had been given the option of allergy shots last year, which she had declined.  Her son had covid, but she was last around him 5 weeks ago.    HPI    I personally reviewed medications/allergies/history listed below:    Patient Active Problem List    Diagnosis Date Noted     H/O adenomatous polyp of colon 10/26/2020     Priority: Medium     Mild intermittent extrinsic asthma without complication 04/24/2019     Priority: Medium     Hypercholesterolemia 02/08/2018     Priority: Medium     Obesity 02/08/2018     Priority: Medium     Overview:   Obesity, BMI 41.53;   Has joined exercise program and working on dietary changes        Rosacea 02/08/2018     Priority: Medium     Asymptomatic varicose veins 02/08/2018     Priority: Medium     Overview:    preloader comment: selections available to preload differs from paper chart.    *Varicosities of the left leg.       ACP (advance care planning) 05/12/2016     Priority: Medium     Advance Care Planning 5/12/2016: ACP Review of Chart / Resources Provided:  Reviewed chart for advance care plan.  Laura Romero has been provided information and resources to begin or update their advance care plan.  Added by Siri Westbrook             S/P nasal septoplasty 03/27/2013     Priority: Medium     Perennial allergic rhinitis 03/27/2013     Priority: Medium     Nephrolithiasis 12/12/2012     Priority: Medium     Allergic rhinitis 02/03/2012     Priority: Medium     Pain in joint, lower leg 05/04/2011     Priority: Medium     Overview:   possible meniscal tear       HTN (hypertension) 10/04/2010     Priority: Medium     Hypothyroidism 10/04/2010     Priority: Medium     Past Medical History:   Diagnosis Date     Bacteremia      12/04,Negative coag testing     Calculus of kidney     age 19 and age 50     Chronic sinusitis     5/20/2011     Deviated nasal septum     8/3/2012     Deviated nasal septum     sever turbinate hypertophy and superior septal deviation to right with recurrent sinusitis     Gastro-esophageal reflux disease without esophagitis     8/31/2009     Personal history of other medical treatment (CODE)     rectocele with cervical prolapse     Phlebitis and thrombophlebitis     1/6/11, after varicose vein ablation     Phlebitis and thrombophlebitis of lower extremities, unspecified (CODE)     1/6/2011,Greater saphenous vein left lower extremity     Pneumonia     7/27/2012      Past Surgical History:   Procedure Laterality Date     COLONOSCOPY      10/13/08,which was normal.  Next colonoscopy due in 2018.     COLONOSCOPY N/A 10/26/2020    F/U 2025 tubular adenoma     HYSTERECTOMY VAGINAL      04/27/05, anterior repair with posterior perineorrhaphy and vaginal vault suspension     LAPAROSCOPIC TUBAL LIGATION       No Comments Provided     OTHER SURGICAL HISTORY      ,LITHOTRIPSY, with stent placement for right ureteral stone     OTHER SURGICAL HISTORY      05,[id number],REMOVE,ureteral Stent removal     SEPTOPLASTY      2012,with turbinate reduction;  Dr. England     TONSILLECTOMY      No Comments Provided     Family History   Problem Relation Age of Onset     Heart Disease Mother 49        Heart Disease,MI     Hypertension Mother         Hypertension     Arthritis Father         Arthritis,Rheumatoid arthritis     Heart Disease Father         Heart Disease, MI     Hypertension Father         Hypertension     Other - See Comments Maternal Grandmother          of blood clot at childbirth.     Hypertension Sister         Hypertension     Hypertension Brother         Hypertension     Other - See Comments Other         Did have a blood clot     Other - See Comments Daughter         GI Disease,Ulcerative colitis diagnosed 2009     Breast Cancer No family hx of         Cancer-breast     Social History     Tobacco Use     Smoking status: Never Smoker     Smokeless tobacco: Never Used   Substance Use Topics     Alcohol use: Yes     Alcohol/week: 0.0 standard drinks     Comment: Alcoholic Drinks/day: infrequent social     Social History     Social History Narrative    Retired from  [employer]; now helping with grandchildren    Patient has never smoked.     Passive smoke exposure - no    Alcohol Use - yes    Drug Use - no    HIV/High Risk - no    Regular Exercise - yes     Meghana Anderson     Current Outpatient Medications   Medication Sig Dispense Refill     albuterol (PROAIR HFA/PROVENTIL HFA/VENTOLIN HFA) 108 (90 Base) MCG/ACT inhaler Inhale 2 puffs into the lungs every 4 hours as needed Inhale 1-2 puff 1 Inhaler 11     aspirin EC 81 MG EC tablet Take 81 mg by mouth daily with food       budesonide (PULMICORT) 0.5 MG/2ML neb solution SPRAY 2MLS IN NOSTRIL TWICE DAILY MAKE 240CC MICHAEL MED SINUS IRRI. MIX 2ML VIAL OF  "BUDESONIDE 0.5MG RINSE 1-2 X DAILY AS NEEDED X 2-4 WEEKS (Patient taking differently: daily as needed ) 360 mL 1     cefdinir (OMNICEF) 300 MG capsule Take 1 capsule (300 mg) by mouth 2 times daily 14 capsule 0     cetirizine (ZYRTEC) 10 MG tablet Take 10 mg by mouth daily       Cholecalciferol (VITAMIN D PO) Take 1 tablet by mouth daily        fluticasone (FLONASE) 50 MCG/ACT nasal spray Spray 2 sprays into both nostrils daily 18.2 mL 11     Krill Oil 500 MG CAPS Take 1 capsule by mouth daily       levothyroxine (SYNTHROID/LEVOTHROID) 100 MCG tablet TAKE 1 TABLET(100 MCG) BY MOUTH EVERY MORNING BEFORE BREAKFAST 90 tablet 3     lisinopril (ZESTRIL) 20 MG tablet Take 1 tablet (20 mg) by mouth daily 90 tablet 3     metroNIDAZOLE (METROGEL) 0.75 % external gel Apply topically 2 times daily 45 g 3     Multiple Vitamin (MULTIVITAMIN) per tablet Take 1 tablet by mouth daily. Food       other medical supplies [employer].  Diagnosis:  Z23. 1 each 1     pravastatin (PRAVACHOL) 40 MG tablet Take 1 tablet (40 mg) by mouth At Bedtime 90 tablet 3     predniSONE (DELTASONE) 20 MG tablet Take 1 tablet (20 mg) by mouth 2 times daily for 5 days 10 tablet 0     Allergies   Allergen Reactions     Azithromycin Hives     Zithromax         Review of Systems   Constitutional: Negative for chills and fever.   Respiratory: Negative for cough and shortness of breath.    Cardiovascular: Negative for peripheral edema.   Psychiatric/Behavioral: Negative for mood changes. The patient is not nervous/anxious.         OBJECTIVE:     /80 (BP Location: Right arm, Patient Position: Sitting, Cuff Size: Adult Large)   Pulse 71   Temp 98.5  F (36.9  C) (Tympanic)   Resp 18   Ht 1.613 m (5' 3.5\")   Wt 108.4 kg (239 lb)   LMP  (LMP Unknown)   SpO2 97%   Breastfeeding No   BMI 41.67 kg/m    Body mass index is 41.67 kg/m .  Physical Exam  Constitutional:       Appearance: Normal appearance.   HENT:      Head: Normocephalic.      Ears:      " Comments: Left > right TMs are retracted, but without redness.     Nose: Congestion present.      Mouth/Throat:      Mouth: Mucous membranes are moist.      Pharynx: No oropharyngeal exudate or posterior oropharyngeal erythema.   Eyes:      Extraocular Movements: Extraocular movements intact.      Pupils: Pupils are equal, round, and reactive to light.   Neck:      Musculoskeletal: Normal range of motion and neck supple.   Cardiovascular:      Rate and Rhythm: Normal rate and regular rhythm.      Pulses: Normal pulses.      Heart sounds: No murmur.   Pulmonary:      Effort: Pulmonary effort is normal.      Breath sounds: Normal breath sounds. No wheezing, rhonchi or rales.   Musculoskeletal:      Right lower leg: No edema.      Left lower leg: No edema.   Lymphadenopathy:      Cervical: No cervical adenopathy.   Neurological:      Mental Status: She is alert.   Psychiatric:         Mood and Affect: Mood normal.           PHQ-9 SCORE 7/30/2015 8/4/2016   PHQ-9 Total Score 0 0       PHQ-2 Score:     PHQ-2 ( 1999 Pfizer) 12/3/2020 8/27/2020   Q1: Little interest or pleasure in doing things 0 0   Q2: Feeling down, depressed or hopeless 0 0   PHQ-2 Score 0 0         ACT Total Scores 8/27/2020 12/3/2020   ACT TOTAL SCORE (Goal Greater than or Equal to 20) 25 25   In the past 12 months, how many times did you visit the emergency room for your asthma without being admitted to the hospital? 0 0   In the past 12 months, how many times were you hospitalized overnight because of your asthma? 0 0         I personally reviewed results withpatient as listed below:   Diagnostic Test Results:  none     ASSESSMENT/PLAN:       ICD-10-CM    1. Acute recurrent maxillary sinusitis  J01.01 cefdinir (OMNICEF) 300 MG capsule     predniSONE (DELTASONE) 20 MG tablet       1.  She definitely has significant allergies and her symptoms seem at this time with allergies with a superimposed sinusitis.  Treat with cefdinir and prednisone.  If  symptoms do not improve, consider imaging of sinuses and brain.  She feels that she has been fairly isolated and feels that it would be unlikely for covid to be causing her current symptoms and declines testing.    Gloria Mcnally MD  [facility] AND [facility]    Portions of this dictation were created using the Dragon Nuance voice recognition system. Proofreading was completed but there may be errors in text.

## 2020-12-04 ASSESSMENT — ASTHMA QUESTIONNAIRES: ACT_TOTALSCORE: 25

## 2020-12-11 ENCOUNTER — OFFICE VISIT (OUTPATIENT)
Dept: OTOLARYNGOLOGY | Facility: OTHER | Age: 65
End: 2020-12-11
Attending: PHYSICIAN ASSISTANT
Payer: MEDICARE

## 2020-12-11 VITALS
SYSTOLIC BLOOD PRESSURE: 122 MMHG | BODY MASS INDEX: 40.8 KG/M2 | TEMPERATURE: 98.2 F | OXYGEN SATURATION: 98 % | HEIGHT: 64 IN | DIASTOLIC BLOOD PRESSURE: 76 MMHG | HEART RATE: 80 BPM | WEIGHT: 239 LBS

## 2020-12-11 DIAGNOSIS — J32.2 CHRONIC ETHMOIDAL SINUSITIS: ICD-10-CM

## 2020-12-11 DIAGNOSIS — J30.89 PERENNIAL ALLERGIC RHINITIS: Primary | ICD-10-CM

## 2020-12-11 DIAGNOSIS — J30.2 SEASONAL ALLERGIC RHINITIS, UNSPECIFIED TRIGGER: ICD-10-CM

## 2020-12-11 DIAGNOSIS — R51.9 RECURRENT HEADACHE: ICD-10-CM

## 2020-12-11 DIAGNOSIS — R06.83 SNORING: ICD-10-CM

## 2020-12-11 DIAGNOSIS — R51.9 FACIAL PAIN: ICD-10-CM

## 2020-12-11 PROCEDURE — 99213 OFFICE O/P EST LOW 20 MIN: CPT | Mod: 25 | Performed by: PHYSICIAN ASSISTANT

## 2020-12-11 PROCEDURE — 31231 NASAL ENDOSCOPY DX: CPT | Performed by: PHYSICIAN ASSISTANT

## 2020-12-11 PROCEDURE — G0463 HOSPITAL OUTPT CLINIC VISIT: HCPCS | Mod: 25 | Performed by: PHYSICIAN ASSISTANT

## 2020-12-11 RX ORDER — AZELASTINE 1 MG/ML
2 SPRAY, METERED NASAL 2 TIMES DAILY
Qty: 1 BOTTLE | Refills: 3 | Status: SHIPPED | OUTPATIENT
Start: 2020-12-11 | End: 2021-11-05

## 2020-12-11 ASSESSMENT — PAIN SCALES - GENERAL: PAINLEVEL: NO PAIN (0)

## 2020-12-11 ASSESSMENT — MIFFLIN-ST. JEOR: SCORE: 1606.16

## 2020-12-11 NOTE — LETTER
12/11/2020         RE: Laura Romero  [address]  [place] [address]        Dear Colleague,    Thank you for referring your patient, Laura Romero, to the [facility]. Please see a copy of my visit note below.    Chief Complaint   Patient presents with     Sinus Problem     Pt is here for her sinuses and allergies.       Laura returns to ENT for concerns of her sinuses/ allergies. She was last seen on 10/3/19 following MQT.   She returns for recheck. She had increase in symptoms over the last several weeks. Increase in facial pain, pressure, sinus congestion. She was treated with Prednisone for 5 days without relief. Further started on Cefdinir and has felt improvement.   Laura has felt increase in pain throughout ethmoid and frontal pain.   She has been using Flonase, Zytec, Sammy Med and Budesonide rinses.     She has intermittent nasal congestion, increase in post nasal drainage, ear pressure along her postauricular. She uses heat pad along her neck and advil resolves her pain.   Breathing has been well. No concerns for wheezing.     Hx of nasal septoplasty.   Hx of SCIT.     MQT- 10/3/19  Dilution #6-None  Dilution #5- Oak, Alternia, helmintherosoirum, dust.   Dilution #2- weeds, grass, trees, mold, cat, dog        Past Medical History:   Diagnosis Date     Bacteremia      12/04,Negative coag testing     Calculus of kidney     age 19 and age 50     Chronic sinusitis     5/20/2011     Deviated nasal septum     8/3/2012     Deviated nasal septum     sever turbinate hypertophy and superior septal deviation to right with recurrent sinusitis     Gastro-esophageal reflux disease without esophagitis     8/31/2009     Personal history of other medical treatment (CODE)     rectocele with cervical prolapse     Phlebitis and thrombophlebitis     1/6/11, after varicose vein ablation     Phlebitis and thrombophlebitis of lower extremities, unspecified (CODE)     1/6/2011,Greater  "saphenous vein left lower extremity     Pneumonia     7/27/2012        Allergies   Allergen Reactions     Azithromycin Hives     Zithromax       Current Outpatient Medications   Medication     albuterol (PROAIR HFA/PROVENTIL HFA/VENTOLIN HFA) 108 (90 Base) MCG/ACT inhaler     aspirin EC 81 MG EC tablet     budesonide (PULMICORT) 0.5 MG/2ML neb solution     cefdinir (OMNICEF) 300 MG capsule     cetirizine (ZYRTEC) 10 MG tablet     Cholecalciferol (VITAMIN D PO)     fluticasone (FLONASE) 50 MCG/ACT nasal spray     Krill Oil 500 MG CAPS     levothyroxine (SYNTHROID/LEVOTHROID) 100 MCG tablet     lisinopril (ZESTRIL) 20 MG tablet     metroNIDAZOLE (METROGEL) 0.75 % external gel     Multiple Vitamin (MULTIVITAMIN) per tablet     other medical supplies     pravastatin (PRAVACHOL) 40 MG tablet     No current facility-administered medications for this visit.       ROS: 10 point ROS neg other than the symptoms noted above in the HPI.  /76 (Cuff Size: Adult Regular)   Pulse 80   Temp 98.2  F (36.8  C) (Tympanic)   Ht 1.613 m (5' 3.5\")   Wt 108.4 kg (239 lb)   LMP  (LMP Unknown)   SpO2 98%   BMI 41.67 kg/m    General - The patient is well nourished and well developed, and appears to have good nutritional status.  Alert and oriented to person and place, answers questions and cooperates with examination appropriately.   Head and Face - Normocephalic and atraumatic, with no gross asymmetry noted.  The facial nerve is intact, with strong symmetric movements.  Voice and Breathing - The patient was breathing comfortably without the use of accessory muscles. There was no wheezing, stridor, or stertor.  The patients voice was clear and strong, and had appropriate pitch and quality.  Ears -The external auditory canals are patent, the tympanic membranes are intact without effusion, retraction or mass.  Bony landmarks are intact.  Eyes - Extraocular movements intact, and the pupils were reactive to light.  Sclera were not " icteric or injected, conjunctiva were pink and moist.  Mouth - Examination of the oral cavity showed pink, healthy oral mucosa. No lesions or ulcerations noted.  The tongue was mobile and midline, and the dentition were in good condition.    Crowded posterior pharynx. FTP IV  Throat - The walls of the oropharynx were smooth, pink, moist, symmetric, and had no lesions or ulcerations.  The tonsillar pillars and soft palate were symmetric.  The uvula was midline on elevation.    Neck - Normal midline excursion of the laryngotracheal complex during swallowing.  Full range of motion on passive movement.  Palpation of the occipital, submental, submandibular, internal jugular chain, and supraclavicular nodes did not demonstrate any abnormal lymph nodes or masses.  Palpation of the thyroid was soft and smooth, with no nodules or goiter appreciated.  The trachea was mobile and midline.    To evaluate the nose and sinuses in the post operative state, I performed rigid nasal endoscopy. The nose was anesthetized with home afrin or topical lidocaine and neosynephrine in the office.    I began with the LEFT side using a 0 degree rigid nasal endoscope, and then similarly examined the RIGHT side    Findings:  Septum: Midline.   Inferior turbinates:  Mild edema.   Middle turbinate and middle meatus:  No purulence, no polyposis, no synechiae    Mucosa is  healthy throughout without polyps nor polypoid degeneration  Np is Clear. ET patent bilaterally.   No active drainage.     Heart- Regular rate  Lungs- Clear Anterior and posterior.       ASSESSMENT:    ICD-10-CM    1. Perennial allergic rhinitis  J30.89 azelastine (ASTELIN) 0.1 % nasal spray   2. Seasonal allergic rhinitis, unspecified trigger  J30.2    3. Recurrent headache  R51.9 SLEEP EVALUATION & MANAGEMENT REFERRAL - [facility] [phone] (Age 13 and up)     CT Sinus w/o Contrast   4. Facial pain  R51.9 CT Sinus w/o Contrast   5.  Chronic ethmoidal sinusitis  J32.2 CT Sinus w/o Contrast   6. Snoring  R06.83 SLEEP EVALUATION & MANAGEMENT REFERRAL - ADULT -[facility] [phone] (Age 13 and up)         Continue with Budesonide rinses.   Rinse 1-2 times daily as needed for congestion.   Continue with Flonase.   Start Astelin 2 sprays to each nostril 1-2 times daily.   Continue with Zyrtec.     Complete Sinus CT   Complete sleep study.   Follow up in 6 weeks.            Lorelei Guzman PA-C  ENT  [facility], [place]  [phone]        Again, thank you for allowing me to participate in the care of your patient.        Sincerely,        Lorelei Guzman PA-C

## 2020-12-11 NOTE — PROGRESS NOTES
Chief Complaint   Patient presents with     Sinus Problem     Pt is here for her sinuses and allergies.       Laura returns to ENT for concerns of her sinuses/ allergies. She was last seen on 10/3/19 following MQT.   She returns for recheck. She had increase in symptoms over the last several weeks. Increase in facial pain, pressure, sinus congestion. She was treated with Prednisone for 5 days without relief. Further started on Cefdinir and has felt improvement.   Laura has felt increase in pain throughout ethmoid and frontal pain.   She has been using Flonase, Zytec, Sammy Med and Budesonide rinses.     She has intermittent nasal congestion, increase in post nasal drainage, ear pressure along her postauricular. She uses heat pad along her neck and advil resolves her pain.   Breathing has been well. No concerns for wheezing.     Hx of nasal septoplasty.   Hx of SCIT.     MQT- 10/3/19  Dilution #6-None  Dilution #5- Oak, Alternia, helmintherosoirum, dust.   Dilution #2- weeds, grass, trees, mold, cat, dog        Past Medical History:   Diagnosis Date     Bacteremia      12/04,Negative coag testing     Calculus of kidney     age 19 and age 50     Chronic sinusitis     5/20/2011     Deviated nasal septum     8/3/2012     Deviated nasal septum     sever turbinate hypertophy and superior septal deviation to right with recurrent sinusitis     Gastro-esophageal reflux disease without esophagitis     8/31/2009     Personal history of other medical treatment (CODE)     rectocele with cervical prolapse     Phlebitis and thrombophlebitis     1/6/11, after varicose vein ablation     Phlebitis and thrombophlebitis of lower extremities, unspecified (CODE)     1/6/2011,Greater saphenous vein left lower extremity     Pneumonia     7/27/2012        Allergies   Allergen Reactions     Azithromycin Hives     Zithromax       Current Outpatient Medications   Medication     albuterol (PROAIR HFA/PROVENTIL HFA/VENTOLIN HFA) 108 (90 Base)  "MCG/ACT inhaler     aspirin EC 81 MG EC tablet     budesonide (PULMICORT) 0.5 MG/2ML neb solution     cefdinir (OMNICEF) 300 MG capsule     cetirizine (ZYRTEC) 10 MG tablet     Cholecalciferol (VITAMIN D PO)     fluticasone (FLONASE) 50 MCG/ACT nasal spray     Krill Oil 500 MG CAPS     levothyroxine (SYNTHROID/LEVOTHROID) 100 MCG tablet     lisinopril (ZESTRIL) 20 MG tablet     metroNIDAZOLE (METROGEL) 0.75 % external gel     Multiple Vitamin (MULTIVITAMIN) per tablet     other medical supplies     pravastatin (PRAVACHOL) 40 MG tablet     No current facility-administered medications for this visit.       ROS: 10 point ROS neg other than the symptoms noted above in the HPI.  /76 (Cuff Size: Adult Regular)   Pulse 80   Temp 98.2  F (36.8  C) (Tympanic)   Ht 1.613 m (5' 3.5\")   Wt 108.4 kg (239 lb)   LMP  (LMP Unknown)   SpO2 98%   BMI 41.67 kg/m    General - The patient is well nourished and well developed, and appears to have good nutritional status.  Alert and oriented to person and place, answers questions and cooperates with examination appropriately.   Head and Face - Normocephalic and atraumatic, with no gross asymmetry noted.  The facial nerve is intact, with strong symmetric movements.  Voice and Breathing - The patient was breathing comfortably without the use of accessory muscles. There was no wheezing, stridor, or stertor.  The patients voice was clear and strong, and had appropriate pitch and quality.  Ears -The external auditory canals are patent, the tympanic membranes are intact without effusion, retraction or mass.  Bony landmarks are intact.  Eyes - Extraocular movements intact, and the pupils were reactive to light.  Sclera were not icteric or injected, conjunctiva were pink and moist.  Mouth - Examination of the oral cavity showed pink, healthy oral mucosa. No lesions or ulcerations noted.  The tongue was mobile and midline, and the dentition were in good condition.    Crowded posterior " pharynx. FTP IV  Throat - The walls of the oropharynx were smooth, pink, moist, symmetric, and had no lesions or ulcerations.  The tonsillar pillars and soft palate were symmetric.  The uvula was midline on elevation.    Neck - Normal midline excursion of the laryngotracheal complex during swallowing.  Full range of motion on passive movement.  Palpation of the occipital, submental, submandibular, internal jugular chain, and supraclavicular nodes did not demonstrate any abnormal lymph nodes or masses.  Palpation of the thyroid was soft and smooth, with no nodules or goiter appreciated.  The trachea was mobile and midline.    To evaluate the nose and sinuses in the post operative state, I performed rigid nasal endoscopy. The nose was anesthetized with home afrin or topical lidocaine and neosynephrine in the office.    I began with the LEFT side using a 0 degree rigid nasal endoscope, and then similarly examined the RIGHT side    Findings:  Septum: Midline.   Inferior turbinates:  Mild edema.   Middle turbinate and middle meatus:  No purulence, no polyposis, no synechiae    Mucosa is  healthy throughout without polyps nor polypoid degeneration  Np is Clear. ET patent bilaterally.   No active drainage.     Heart- Regular rate  Lungs- Clear Anterior and posterior.       ASSESSMENT:    ICD-10-CM    1. Perennial allergic rhinitis  J30.89 azelastine (ASTELIN) 0.1 % nasal spray   2. Seasonal allergic rhinitis, unspecified trigger  J30.2    3. Recurrent headache  R51.9 SLEEP EVALUATION & MANAGEMENT REFERRAL - [facility] and [facility] [phone] (Age 13 and up)     CT Sinus w/o Contrast   4. Facial pain  R51.9 CT Sinus w/o Contrast   5. Chronic ethmoidal sinusitis  J32.2 CT Sinus w/o Contrast   6. Snoring  R06.83 SLEEP EVALUATION & MANAGEMENT REFERRAL - [facility] [phone] (Age 13 and up)         Continue with Budesonide rinses.   Rinse 1-2 times  daily as needed for congestion.   Continue with Flonase.   Start Astelin 2 sprays to each nostril 1-2 times daily.   Continue with Zyrtec.     Complete Sinus CT   Complete sleep study.   Follow up in 6 weeks.            Lorelei Guzman PA-C  ENT  [facility], [place]  [phone]

## 2020-12-11 NOTE — NURSING NOTE
"Chief Complaint   Patient presents with     Sinus Problem     Pt is here for her sinuses and allergies.       Initial /76 (Cuff Size: Adult Regular)   Pulse 80   Temp 98.2  F (36.8  C) (Tympanic)   Ht 1.613 m (5' 3.5\")   Wt 108.4 kg (239 lb)   LMP  (LMP Unknown)   SpO2 98%   BMI 41.67 kg/m   Estimated body mass index is 41.67 kg/m  as calculated from the following:    Height as of this encounter: 1.613 m (5' 3.5\").    Weight as of this encounter: 108.4 kg (239 lb).  Medication Reconciliation: complete  Rakel Delvalle LPN    "

## 2020-12-11 NOTE — PATIENT INSTRUCTIONS
Continue with Budesonide rinses.   Rinse 1-2 times daily as needed for congestion.   Continue with Flonase.   Start Astelin 2 sprays to each nostril 1-2 times daily.   Continue with Zyrtec.     Complete Sinus CT   Complete sleep study.   Follow up in 6 weeks.      Thank you for allowing [facility] and our ENT team to participate in your care.  If your medications are too expensive, please give the nurse a call.  We can possibly change this medication.  If you have a scheduling or an appointment question please contact our Health Unit Coordinator at their direct line [phone].   ALL nursing questions or concerns can be directed to your ENT nurse at: [phone]--Siri

## 2020-12-16 ENCOUNTER — MYC MEDICAL ADVICE (OUTPATIENT)
Dept: FAMILY MEDICINE | Facility: OTHER | Age: 65
End: 2020-12-16

## 2020-12-16 DIAGNOSIS — J01.01 ACUTE RECURRENT MAXILLARY SINUSITIS: Primary | ICD-10-CM

## 2020-12-22 ENCOUNTER — HOSPITAL ENCOUNTER (OUTPATIENT)
Dept: CT IMAGING | Facility: OTHER | Age: 65
Discharge: HOME | End: 2020-12-22
Attending: FAMILY MEDICINE | Admitting: FAMILY MEDICINE
Payer: MEDICARE

## 2020-12-22 DIAGNOSIS — J01.01 ACUTE RECURRENT MAXILLARY SINUSITIS: ICD-10-CM

## 2020-12-22 PROCEDURE — 70486 CT MAXILLOFACIAL W/O DYE: CPT

## 2020-12-31 ENCOUNTER — MYC MEDICAL ADVICE (OUTPATIENT)
Dept: OTOLARYNGOLOGY | Facility: OTHER | Age: 65
End: 2020-12-31

## 2021-01-22 ENCOUNTER — MYC MEDICAL ADVICE (OUTPATIENT)
Dept: FAMILY MEDICINE | Facility: OTHER | Age: 66
End: 2021-01-22

## 2021-05-06 ENCOUNTER — HOSPITAL ENCOUNTER (OUTPATIENT)
Dept: MAMMOGRAPHY | Facility: OTHER | Age: 66
Discharge: HOME | End: 2021-05-06
Attending: FAMILY MEDICINE | Admitting: FAMILY MEDICINE
Payer: MEDICARE

## 2021-05-06 DIAGNOSIS — Z12.31 VISIT FOR SCREENING MAMMOGRAM: ICD-10-CM

## 2021-05-06 PROCEDURE — 77063 BREAST TOMOSYNTHESIS BI: CPT

## 2021-08-27 DIAGNOSIS — I10 ESSENTIAL HYPERTENSION: ICD-10-CM

## 2021-08-27 RX ORDER — LISINOPRIL 20 MG/1
TABLET ORAL
Qty: 90 TABLET | Refills: 3 | Status: SHIPPED | OUTPATIENT
Start: 2021-08-27 | End: 2022-09-08

## 2021-08-27 NOTE — TELEPHONE ENCOUNTER
Prescription refilled per RN Medication Refill Policy..................Shaniqua Zhou RN 8/27/2021 7:59 AM

## 2021-09-16 DIAGNOSIS — E78.00 HYPERCHOLESTEROLEMIA: ICD-10-CM

## 2021-09-16 DIAGNOSIS — E03.9 HYPOTHYROIDISM, UNSPECIFIED TYPE: ICD-10-CM

## 2021-09-20 RX ORDER — PRAVASTATIN SODIUM 40 MG
TABLET ORAL
Qty: 90 TABLET | Refills: 3 | Status: SHIPPED | OUTPATIENT
Start: 2021-09-20 | End: 2021-12-20

## 2021-09-20 RX ORDER — LEVOTHYROXINE SODIUM 100 UG/1
TABLET ORAL
Qty: 90 TABLET | Refills: 3 | Status: SHIPPED | OUTPATIENT
Start: 2021-09-20 | End: 2022-07-29

## 2021-09-20 NOTE — TELEPHONE ENCOUNTER
[facility] sent Rx request for the following:     Requested Prescriptions   Pending Prescriptions Disp Refills     levothyroxine (SYNTHROID/LEVOTHROID) 100 MCG tablet [Pharmacy Med Name: LEVOTHYROXINE 0.100MG (100MCG) TAB] 90 tablet 3     Sig: TAKE 1 TABLET(100 MCG) BY MOUTH EVERY MORNING BEFORE BREAKFAST      Last Prescription Date:   8/27/2020  Last Fill Qty/Refills:         90, R-3    Last Office Visit:              12/3/2020   Future Office visit:           none    Routing refill request to provider for review/approval because:     Thyroid Protocol Failed - 9/16/2021  4:03 AM        Failed - Normal TSH on file in past 12 months     No lab results found.                 pravastatin (PRAVACHOL) 40 MG tablet [Pharmacy Med Name: PRAVASTATIN 40MG TABLETS] 90 tablet 3     Sig: TAKE 1 TABLET(40 MG) BY MOUTH AT BEDTIME     Last Prescription Date:   8/27/2020  Last Fill Qty/Refills:         90, R-3    Routing refill request to provider for review/approval because:      Statins Protocol Failed - 9/16/2021  4:03 AM        Failed - LDL on file in past 12 months     Recent Labs   Lab Test 08/27/20  1050   *        Unable to complete prescription refill per RN Medication Refill Policy.................... Marnie Duran RN ....................  9/20/2021   11:00 AM

## 2021-10-13 ENCOUNTER — TRANSFERRED RECORDS (OUTPATIENT)
Dept: HEALTH INFORMATION MANAGEMENT | Facility: HOSPITAL | Age: 66
End: 2021-10-13

## 2021-10-17 ENCOUNTER — MYC MEDICAL ADVICE (OUTPATIENT)
Dept: OTOLARYNGOLOGY | Facility: OTHER | Age: 66
End: 2021-10-17

## 2021-10-17 DIAGNOSIS — J30.89 PERENNIAL ALLERGIC RHINITIS: ICD-10-CM

## 2021-10-17 DIAGNOSIS — J30.2 SEASONAL ALLERGIC RHINITIS, UNSPECIFIED TRIGGER: ICD-10-CM

## 2021-10-18 RX ORDER — BUDESONIDE 0.5 MG/2ML
INHALANT ORAL
Qty: 360 ML | Refills: 1 | Status: SHIPPED | OUTPATIENT
Start: 2021-10-18 | End: 2022-12-05

## 2021-11-05 ENCOUNTER — OFFICE VISIT (OUTPATIENT)
Dept: FAMILY MEDICINE | Facility: OTHER | Age: 66
End: 2021-11-05
Attending: FAMILY MEDICINE
Payer: MEDICARE

## 2021-11-05 VITALS
HEIGHT: 64 IN | DIASTOLIC BLOOD PRESSURE: 64 MMHG | TEMPERATURE: 98.2 F | SYSTOLIC BLOOD PRESSURE: 128 MMHG | BODY MASS INDEX: 40.63 KG/M2 | RESPIRATION RATE: 16 BRPM | WEIGHT: 238 LBS | OXYGEN SATURATION: 98 % | HEART RATE: 78 BPM

## 2021-11-05 DIAGNOSIS — Z82.49 FAMILY HISTORY OF ISCHEMIC HEART DISEASE: ICD-10-CM

## 2021-11-05 DIAGNOSIS — I10 PRIMARY HYPERTENSION: ICD-10-CM

## 2021-11-05 DIAGNOSIS — Z23 NEEDS FLU SHOT: ICD-10-CM

## 2021-11-05 DIAGNOSIS — J30.89 PERENNIAL ALLERGIC RHINITIS: ICD-10-CM

## 2021-11-05 DIAGNOSIS — Z13.0 SCREENING FOR DEFICIENCY ANEMIA: ICD-10-CM

## 2021-11-05 DIAGNOSIS — E03.9 HYPOTHYROIDISM, UNSPECIFIED TYPE: ICD-10-CM

## 2021-11-05 DIAGNOSIS — Z23 NEED FOR SHINGLES VACCINE: ICD-10-CM

## 2021-11-05 DIAGNOSIS — E78.00 HYPERCHOLESTEROLEMIA: ICD-10-CM

## 2021-11-05 DIAGNOSIS — M25.50 MULTIPLE JOINT PAIN: ICD-10-CM

## 2021-11-05 DIAGNOSIS — Z00.00 MEDICARE ANNUAL WELLNESS VISIT, SUBSEQUENT: Primary | ICD-10-CM

## 2021-11-05 PROBLEM — E66.01 MORBID OBESITY (H): Status: ACTIVE | Noted: 2021-11-05

## 2021-11-05 LAB
ALBUMIN SERPL-MCNC: 4.5 G/DL (ref 3.5–5.7)
ALP SERPL-CCNC: 63 U/L (ref 34–104)
ALT SERPL W P-5'-P-CCNC: 17 U/L (ref 7–52)
ANION GAP SERPL CALCULATED.3IONS-SCNC: 7 MMOL/L (ref 3–14)
AST SERPL W P-5'-P-CCNC: 18 U/L (ref 13–39)
BASOPHILS # BLD AUTO: 0.1 10E3/UL (ref 0–0.2)
BASOPHILS NFR BLD AUTO: 1 %
BILIRUB SERPL-MCNC: 0.8 MG/DL (ref 0.3–1)
BUN SERPL-MCNC: 20 MG/DL (ref 7–25)
CALCIUM SERPL-MCNC: 9.4 MG/DL (ref 8.6–10.3)
CHLORIDE BLD-SCNC: 103 MMOL/L (ref 98–107)
CHOLEST SERPL-MCNC: 236 MG/DL
CO2 SERPL-SCNC: 30 MMOL/L (ref 21–31)
CREAT SERPL-MCNC: 0.73 MG/DL (ref 0.6–1.2)
CRP SERPL-MCNC: 3.6 MG/L
EOSINOPHIL # BLD AUTO: 0.2 10E3/UL (ref 0–0.7)
EOSINOPHIL NFR BLD AUTO: 3 %
ERYTHROCYTE [DISTWIDTH] IN BLOOD BY AUTOMATED COUNT: 14.8 % (ref 10–15)
ERYTHROCYTE [SEDIMENTATION RATE] IN BLOOD BY WESTERGREN METHOD: 7 MM/HR (ref 0–30)
FASTING STATUS PATIENT QL REPORTED: YES
GFR SERPL CREATININE-BSD FRML MDRD: 86 ML/MIN/1.73M2
GLUCOSE BLD-MCNC: 85 MG/DL (ref 70–105)
HCT VFR BLD AUTO: 43.8 % (ref 35–47)
HDLC SERPL-MCNC: 62 MG/DL (ref 23–92)
HGB BLD-MCNC: 14.5 G/DL (ref 11.7–15.7)
IMM GRANULOCYTES # BLD: 0 10E3/UL
IMM GRANULOCYTES NFR BLD: 0 %
LDLC SERPL CALC-MCNC: 144 MG/DL
LYMPHOCYTES # BLD AUTO: 2.5 10E3/UL (ref 0.8–5.3)
LYMPHOCYTES NFR BLD AUTO: 36 %
MCH RBC QN AUTO: 29.7 PG (ref 26.5–33)
MCHC RBC AUTO-ENTMCNC: 33.1 G/DL (ref 31.5–36.5)
MCV RBC AUTO: 90 FL (ref 78–100)
MONOCYTES # BLD AUTO: 0.4 10E3/UL (ref 0–1.3)
MONOCYTES NFR BLD AUTO: 6 %
NEUTROPHILS # BLD AUTO: 3.8 10E3/UL (ref 1.6–8.3)
NEUTROPHILS NFR BLD AUTO: 54 %
NONHDLC SERPL-MCNC: 174 MG/DL
NRBC # BLD AUTO: 0 10E3/UL
NRBC BLD AUTO-RTO: 0 /100
PLATELET # BLD AUTO: 291 10E3/UL (ref 150–450)
POTASSIUM BLD-SCNC: 4.5 MMOL/L (ref 3.5–5.1)
PROT SERPL-MCNC: 6.8 G/DL (ref 6.4–8.9)
RBC # BLD AUTO: 4.89 10E6/UL (ref 3.8–5.2)
RHEUMATOID FACT SER NEPH-ACNC: <14 IU/ML
SODIUM SERPL-SCNC: 140 MMOL/L (ref 134–144)
TRIGL SERPL-MCNC: 150 MG/DL
TSH SERPL DL<=0.005 MIU/L-ACNC: 3.89 MU/L (ref 0.4–4)
URATE SERPL-MCNC: 4.3 MG/DL (ref 4.4–7.6)
WBC # BLD AUTO: 7 10E3/UL (ref 4–11)

## 2021-11-05 PROCEDURE — 90662 IIV NO PRSV INCREASED AG IM: CPT

## 2021-11-05 PROCEDURE — 86200 CCP ANTIBODY: CPT | Mod: ZL | Performed by: FAMILY MEDICINE

## 2021-11-05 PROCEDURE — 84443 ASSAY THYROID STIM HORMONE: CPT | Mod: ZL | Performed by: FAMILY MEDICINE

## 2021-11-05 PROCEDURE — 86038 ANTINUCLEAR ANTIBODIES: CPT | Mod: ZL | Performed by: FAMILY MEDICINE

## 2021-11-05 PROCEDURE — 80053 COMPREHEN METABOLIC PANEL: CPT | Mod: ZL | Performed by: FAMILY MEDICINE

## 2021-11-05 PROCEDURE — 85025 COMPLETE CBC W/AUTO DIFF WBC: CPT | Mod: ZL | Performed by: FAMILY MEDICINE

## 2021-11-05 PROCEDURE — 80061 LIPID PANEL: CPT | Mod: ZL | Performed by: FAMILY MEDICINE

## 2021-11-05 PROCEDURE — G0008 ADMIN INFLUENZA VIRUS VAC: HCPCS

## 2021-11-05 PROCEDURE — 86431 RHEUMATOID FACTOR QUANT: CPT | Mod: ZL | Performed by: FAMILY MEDICINE

## 2021-11-05 PROCEDURE — 36415 COLL VENOUS BLD VENIPUNCTURE: CPT | Mod: ZL | Performed by: FAMILY MEDICINE

## 2021-11-05 PROCEDURE — 86140 C-REACTIVE PROTEIN: CPT | Mod: ZL | Performed by: FAMILY MEDICINE

## 2021-11-05 PROCEDURE — 84550 ASSAY OF BLOOD/URIC ACID: CPT | Mod: ZL | Performed by: FAMILY MEDICINE

## 2021-11-05 PROCEDURE — G0439 PPPS, SUBSEQ VISIT: HCPCS | Performed by: FAMILY MEDICINE

## 2021-11-05 PROCEDURE — 85652 RBC SED RATE AUTOMATED: CPT | Mod: ZL | Performed by: FAMILY MEDICINE

## 2021-11-05 RX ORDER — FLUTICASONE PROPIONATE 50 MCG
2 SPRAY, SUSPENSION (ML) NASAL DAILY
Qty: 18.2 ML | Refills: 11 | Status: SHIPPED | OUTPATIENT
Start: 2021-11-05 | End: 2022-12-05

## 2021-11-05 RX ORDER — FLUTICASONE PROPIONATE 50 MCG
SPRAY, SUSPENSION (ML) NASAL
Qty: 48 G | OUTPATIENT
Start: 2021-11-05

## 2021-11-05 ASSESSMENT — ACTIVITIES OF DAILY LIVING (ADL): CURRENT_FUNCTION: NO ASSISTANCE NEEDED

## 2021-11-05 ASSESSMENT — PAIN SCALES - GENERAL: PAINLEVEL: NO PAIN (0)

## 2021-11-05 ASSESSMENT — MIFFLIN-ST. JEOR: SCORE: 1596.62

## 2021-11-05 NOTE — NURSING NOTE
"Chief Complaint   Patient presents with     Annual Visit       Initial /64   Pulse 78   Temp 98.2  F (36.8  C) (Tympanic)   Resp 16   Ht 1.613 m (5' 3.5\")   Wt 108 kg (238 lb)   LMP  (LMP Unknown)   SpO2 98%   Breastfeeding No   BMI 41.50 kg/m   Estimated body mass index is 41.5 kg/m  as calculated from the following:    Height as of this encounter: 1.613 m (5' 3.5\").    Weight as of this encounter: 108 kg (238 lb).  Medication Reconciliation: complete    FOOD SECURITY SCREENING QUESTIONS  Hunger Vital Signs:  Within the past 12 months we worried whether our food would run out before we got money to buy more. Never  Within the past 12 months the food we bought just didn't last and we didn't have money to get more. Never  Ritika Potter LPN 11/5/2021 8:34 AM       Advance care directive on file? NO  Advance care directive provided to patient? HAS INFORMATION       Ritika Potter LPN  "

## 2021-11-05 NOTE — PROGRESS NOTES
"SUBJECTIVE:   Laura Romero is a 66 year old female who presents for Preventive Visit.    Her right foot has been bothering her.  She has been seeing Susanne Paris in [place].  She was having swelling and pain with walking.  She got fitted with a new orthotic, which made her foot hurt even worse.  She had to go back to her old orthotics.  She had an x-ray which showed ankle arthritis.  She had a steroid injection, which helped.  She was told that she could have an underlying inflammatory disorder such as rheumatoid arthritis.  She has a lot of joint pain in her hands and also right elbow.  She had not been doing anything that inflamed her elbow.  Her dad had rheumatoid arthritis.    She has a history of coronary artery disease.  Several of her family members have had issues as well.  She is interested in further screening to evaluate her underlying risk.    Patient has been advised of split billing requirements and indicates understanding: Yes   Are you in the first 12 months of your Medicare coverage?  No    Healthy Habits:     In general, how would you rate your overall health?  Good    Frequency of exercise:  2-3 days/week    Duration of exercise:  15-30 minutes    Do you usually eat at least 4 servings of fruit and vegetables a day, include whole grains    & fiber and avoid regularly eating high fat or \"junk\" foods?  Yes    Taking medications regularly:  Yes    Medication side effects:  None    Ability to successfully perform activities of daily living:  No assistance needed    Home Safety:  No safety concerns identified    Hearing Impairment:  No hearing concerns    In the past 6 months, have you been bothered by leaking of urine? Yes    In general, how would you rate your overall mental or emotional health?  Good      PHQ-2 Total Score: 0    Additional concerns today:  Yes    Do you feel safe in your environment? Yes    Have you ever done Advance Care Planning? (For example, a Health Directive, POLST, or a " discussion with a medical provider or your loved ones about your wishes): No, advance care planning information given to patient to review.  Advanced care planning was discussed at today's visit.      Fall risk  Fallen 2 or more times in the past year?: No  Any fall with injury in the past year?: No    Cognitive Screening   1) Repeat 3 items (Leader, Season, Table)    2) Clock draw: NORMAL  3) 3 item recall: Recalls 3 objects  Results: 3 items recalled: COGNITIVE IMPAIRMENT LESS LIKELY    Mini-CogTM Copyright S Linda. Licensed by the author for use in [facility] "[employer] reprinted with permission (osvaldo@[facility]). All rights reserved.      Do you have sleep apnea, excessive snoring or daytime drowsiness?: no    Reviewed and updated as needed this visit by clinical staff  Tobacco  Allergies    Med Hx  Surg Hx  Fam Hx  Soc Hx        Reviewed and updated as needed this visit by Provider                Social History     Tobacco Use     Smoking status: Never Smoker     Smokeless tobacco: Never Used   Substance Use Topics     Alcohol use: Yes     Alcohol/week: 0.0 standard drinks     Comment: Alcoholic Drinks/day: infrequent social     If you drink alcohol do you typically have >3 drinks per day or >7 drinks per week? No    Alcohol Use 11/5/2021   Prescreen: >3 drinks/day or >7 drinks/week? No   Prescreen: >3 drinks/day or >7 drinks/week? -               Current providers sharing in care for this patient include:   Patient Care Team:  Gloria Mcnally MD as PCP - General (Family Practice)  Gloria Mcnally MD as Assigned PCP  Lorelei Guzman PA-C as Assigned Surgical Provider    The following health maintenance items are reviewed in Epic and correct as of today:  Health Maintenance Due   Topic Date Due     ASTHMA ACTION PLAN  Never done     ZOSTER IMMUNIZATION (1 of 2) Never done     Pneumococcal Vaccine: 65+ Years (1 of 2 - PPSV23) 10/22/2020     ASTHMA CONTROL TEST  06/03/2021     INFLUENZA  VACCINE (1) 2021     MEDICARE ANNUAL WELLNESS VISIT  2021     FALL RISK ASSESSMENT  2021     BP Readings from Last 3 Encounters:   21 128/64   20 122/76   20 128/80    Wt Readings from Last 3 Encounters:   21 108 kg (238 lb)   20 108.4 kg (239 lb)   20 108.4 kg (239 lb)                  Patient Active Problem List   Diagnosis     S/P nasal septoplasty     Perennial allergic rhinitis     ACP (advance care planning)     Allergic rhinitis     HTN (hypertension)     Hypercholesterolemia     Hypothyroidism     Pain in joint, lower leg     Nephrolithiasis     Obesity     Rosacea     Asymptomatic varicose veins     Mild intermittent extrinsic asthma without complication     H/O adenomatous polyp of colon     Morbid obesity (H)     Past Surgical History:   Procedure Laterality Date     COLONOSCOPY      10/13/08,which was normal.  Next colonoscopy due in .     COLONOSCOPY N/A 10/26/2020    F/U  tubular adenoma     HYSTERECTOMY VAGINAL      05, anterior repair with posterior perineorrhaphy and vaginal vault suspension     LAPAROSCOPIC TUBAL LIGATION      No Comments Provided     OTHER SURGICAL HISTORY      ,LITHOTRIPSY, with stent placement for right ureteral stone     OTHER SURGICAL HISTORY      05,2066,REMOVE,ureteral Stent removal     SEPTOPLASTY      2012,with turbinate reduction;  Dr. England     TONSILLECTOMY      No Comments Provided       Social History     Tobacco Use     Smoking status: Never Smoker     Smokeless tobacco: Never Used   Substance Use Topics     Alcohol use: Yes     Alcohol/week: 0.0 standard drinks     Comment: Alcoholic Drinks/day: infrequent social     Family History   Problem Relation Age of Onset     Heart Disease Mother 49        Heart Disease,MI,  at 69 of a massive MI, she had been a heavy smoker     Hypertension Mother         Hypertension     Arthritis Father         Arthritis,Rheumatoid arthritis      Heart Disease Father         Heart Disease, MI     Hypertension Father         Hypertension     Hypertension Sister      Hypertension Sister         Hypertension     Hypertension Brother      Hypertension Brother         Hypertension     Hypertension Brother      Other - See Comments Maternal Grandmother          of blood clot at childbirth around age 36.     Heart Disease Maternal Grandfather          at 62 of MI     Other - See Comments Paternal Grandmother          after cholecystectomy, very obese     Other - See Comments Paternal Grandfather         had been very anemic prior to death     Other - See Comments Daughter         GI Disease,Ulcerative colitis diagnosed      Other - See Comments Other         Did have a blood clot     Breast Cancer No family hx of         Cancer-breast         Current Outpatient Medications   Medication Sig Dispense Refill     albuterol (PROAIR HFA/PROVENTIL HFA/VENTOLIN HFA) 108 (90 Base) MCG/ACT inhaler Inhale 2 puffs into the lungs every 4 hours as needed Inhale 1-2 puff 1 Inhaler 11     aspirin EC 81 MG EC tablet Take 81 mg by mouth daily with food       budesonide (PULMICORT) 0.5 MG/2ML neb solution SPRAY 2MLS IN NOSTRIL TWICE DAILY MAKE 240CC MICHAEL MED SINUS IRRI. MIX 2ML VIAL OF BUDESONIDE 0.5MG RINSE 1-2 X DAILY AS NEEDED X 2-4 WEEKS 360 mL 1     cetirizine (ZYRTEC) 10 MG tablet Take 10 mg by mouth daily       Cholecalciferol (VITAMIN D PO) Take 1 tablet by mouth daily        fluticasone (FLONASE) 50 MCG/ACT nasal spray Spray 2 sprays into both nostrils daily 18.2 mL 11     Krill Oil 500 MG CAPS Take 1 capsule by mouth daily       levothyroxine (SYNTHROID/LEVOTHROID) 100 MCG tablet TAKE 1 TABLET(100 MCG) BY MOUTH EVERY MORNING BEFORE BREAKFAST 90 tablet 3     lisinopril (ZESTRIL) 20 MG tablet TAKE 1 TABLET(20 MG) BY MOUTH DAILY 90 tablet 3     metroNIDAZOLE (METROGEL) 0.75 % external gel Apply topically 2 times daily 45 g 3     Multiple Vitamin (MULTIVITAMIN)  "per tablet Take 1 tablet by mouth daily. Food       other medical supplies Shingrix.  Diagnosis:  Z23. 1 each 1     pravastatin (PRAVACHOL) 40 MG tablet TAKE 1 TABLET(40 MG) BY MOUTH AT BEDTIME 90 tablet 3     Allergies   Allergen Reactions     Azithromycin Hives     Zithromax         Any new diagnosis of family breast, ovarian, or bowel cancer? No    FHS-7: No flowsheet data found.      Pertinent mammograms are reviewed under the imaging tab.    Review of Systems   Constitutional: Negative for chills and fever.   Respiratory: Negative for cough and shortness of breath.    Cardiovascular: Negative for chest pain.   Musculoskeletal: Positive for arthralgias.   Psychiatric/Behavioral: The patient is not nervous/anxious.      Constitutional, HEENT, cardiovascular, pulmonary, GI, , musculoskeletal, neuro, skin, endocrine and psych systems are negative, except as otherwise noted.    OBJECTIVE:   /64   Pulse 78   Temp 98.2  F (36.8  C) (Tympanic)   Resp 16   Ht 1.613 m (5' 3.5\")   Wt 108 kg (238 lb)   LMP  (LMP Unknown)   SpO2 98%   Breastfeeding No   BMI 41.50 kg/m   Estimated body mass index is 41.5 kg/m  as calculated from the following:    Height as of this encounter: 1.613 m (5' 3.5\").    Weight as of this encounter: 108 kg (238 lb).  Physical Exam  GENERAL APPEARANCE: healthy, alert and no distress  EYES: Eyes grossly normal to inspection, PERRL and conjunctivae and sclerae normal  HENT: ear canals and TM's normal, nose and mouth without ulcers or lesions, oropharynx clear and oral mucous membranes moist  NECK: no adenopathy, no asymmetry, masses, or scars and thyroid normal to palpation  RESP: lungs clear to auscultation - no rales, rhonchi or wheezes  BREAST: normal without masses, tenderness or nipple discharge and no palpable axillary masses or adenopathy  CV: regular rate and rhythm, normal S1 S2, no S3 or S4, no murmur, click or rub, no peripheral edema and peripheral pulses strong  ABDOMEN: " soft, nontender, no hepatosplenomegaly, no masses and bowel sounds normal  MS: no musculoskeletal defects are noted and gait is age appropriate without ataxia  SKIN: no suspicious lesions or rashes  NEURO: Normal strength and tone, sensory exam grossly normal, mentation intact and speech normal  PSYCH: mentation appears normal and affect normal/bright    Diagnostic Test Results:  Labs reviewed in Epic    ASSESSMENT / PLAN:       ICD-10-CM    1. Medicare annual wellness visit, subsequent  Z00.00    2. Multiple joint pain  M25.50 Rheumatoid factor     Cyclic Citrullinated Peptide Antibody IgG     Sedimentation Rate (ESR)     CRP inflammation     Anti Nuclear Zahra IgG by IFA with Reflex     Uric acid     Uric acid     Anti Nuclear Zahra IgG by IFA with Reflex     CRP inflammation     Sedimentation Rate (ESR)     Cyclic Citrullinated Peptide Antibody IgG     Rheumatoid factor   3. Family history of ischemic heart disease  Z82.49 CT Coronary Calcium Scan   4. Hypercholesterolemia  E78.00 Lipid Panel     Lipid Panel   5. Primary hypertension  I10 Comprehensive metabolic panel     Comprehensive metabolic panel   6. Hypothyroidism, unspecified type  E03.9 TSH Reflex GH     TSH Reflex GH   7. Perennial allergic rhinitis  J30.89 fluticasone (FLONASE) 50 MCG/ACT nasal spray   8. Needs flu shot  Z23 INFLUENZA, QUAD, HIGH DOSE, PF, 65YR + (FLUZONE HD)   9. Need for shingles vaccine  Z23 other medical supplies   10. Screening for deficiency anemia  Z13.0 CBC and Differential     CBC and Differential     1.  Mammogram is up to date, last completed on 5/6/2021.  Last colonoscopy completed 10/26/2020.  Tubular adenoma noted.  5-year follow-up recommended.  Last DEXA completed 10/28/2020 and was normal.  Pap deferred due to age greater than 65 and prior hysterectomy.  AAA screening last completed 10/28/2020 and was normal.  Tdap is up-to-date, last completed 10/26/2017.  Covid vaccine x3 has been completed.  She declines Pneumovax at  "this time, but will complete that at a later date.  2.  Labs completed as above to evaluate for possible underlying inflammatory process.  Consider rheumatology referral if any labs point in that direction.  3.  CT coronary calcium scan ordered.  4.  Fasting lipid profile and comp panel ordered.  5.  Blood pressure stable.  Labs as above.  6.  Stable.  TSH today as above.  7.  Stable.  Refills as above.  8.  Flu shot updated today.  9.  Prescription given to obtain Shingrix at outside pharmacy.  10.  CBC as above.    Patient has been advised of split billing requirements and indicates understanding: Yes  COUNSELING:  Reviewed preventive health counseling, as reflected in patient instructions       Regular exercise       Healthy diet/nutrition       Vision screening       Dental care       Aspirin prophylaxis        Osteoporosis prevention/bone health       Colon cancer screening    Estimated body mass index is 41.5 kg/m  as calculated from the following:    Height as of this encounter: 1.613 m (5' 3.5\").    Weight as of this encounter: 108 kg (238 lb).    Weight management plan: Discussed healthy diet and exercise guidelines    She reports that she has never smoked. She has never used smokeless tobacco.      Appropriate preventive services were discussed with this patient, including applicable screening as appropriate for cardiovascular disease, diabetes, osteopenia/osteoporosis, and glaucoma.  As appropriate for age/gender, discussed screening for colorectal cancer, prostate cancer, breast cancer, and cervical cancer. Checklist reviewing preventive services available has been given to the patient.    Reviewed patients plan of care and provided an AVS. The Basic Care Plan (routine screening as documented in Health Maintenance) for Laura meets the Care Plan requirement. This Care Plan has been established and reviewed with the Patient.    Counseling Resources:  ATP IV Guidelines  Pooled Cohorts Equation " Calculator  Breast Cancer Risk Calculator  Breast Cancer: Medication to Reduce Risk  FRAX Risk Assessment  ICSI Preventive Guidelines  Dietary Guidelines for Americans, 2010  [employer]'s MyPlate  ASA Prophylaxis  Lung CA Screening    Gloria Mcnally MD  [facility] AND [facility]    Identified Health Risks:

## 2021-11-07 ASSESSMENT — ENCOUNTER SYMPTOMS
NERVOUS/ANXIOUS: 0
FEVER: 0
COUGH: 0
SHORTNESS OF BREATH: 0
CHILLS: 0
ARTHRALGIAS: 1

## 2021-11-08 LAB
ANA PAT SER IF-IMP: ABNORMAL
ANA SER QL IF: ABNORMAL
ANA TITR SER IF: ABNORMAL {TITER}
CCP AB SER IA-ACNC: 1.5 U/ML

## 2021-11-17 NOTE — PROGRESS NOTES
Patient presents to ENT for concerns of epistaxis.   She had noted right epistaxis. She has since noticed blood clots, mucous over the last few months, worsening over the last couple days.     She has been using Flonase, Budesonide rinses.   Her allergies and sinuses have overall been doing well.   Breathing has been well. No concerns for wheezing.      Hx of nasal septoplasty.   Hx of SCIT.      MQT- 10/3/19  Dilution #6-None  Dilution #5- Oak, Alternia, helmintherosoirum, dust.   Dilution #2- weeds, grass, trees, mold, cat, dog           Past Medical History:   Diagnosis Date     Bacteremia      12/04,Negative coag testing     Calculus of kidney     age 19 and age 50     Chronic sinusitis     5/20/2011     Deviated nasal septum     8/3/2012     Deviated nasal septum     sever turbinate hypertophy and superior septal deviation to right with recurrent sinusitis     Gastro-esophageal reflux disease without esophagitis     8/31/2009     Personal history of other medical treatment (CODE)     rectocele with cervical prolapse     Phlebitis and thrombophlebitis     1/6/11, after varicose vein ablation     Phlebitis and thrombophlebitis of lower extremities, unspecified (CODE)     1/6/2011,Greater saphenous vein left lower extremity     Pneumonia     7/27/2012        Allergies   Allergen Reactions     Azithromycin Hives     Zithromax       Current Outpatient Medications   Medication     albuterol (PROAIR HFA/PROVENTIL HFA/VENTOLIN HFA) 108 (90 Base) MCG/ACT inhaler     aspirin EC 81 MG EC tablet     budesonide (PULMICORT) 0.5 MG/2ML neb solution     cetirizine (ZYRTEC) 10 MG tablet     Cholecalciferol (VITAMIN D PO)     fluticasone (FLONASE) 50 MCG/ACT nasal spray     Krill Oil 500 MG CAPS     levothyroxine (SYNTHROID/LEVOTHROID) 100 MCG tablet     lisinopril (ZESTRIL) 20 MG tablet     metroNIDAZOLE (METROGEL) 0.75 % external gel     Multiple Vitamin (MULTIVITAMIN) per tablet     other medical supplies     pravastatin  "(PRAVACHOL) 40 MG tablet     No current facility-administered medications for this visit.     >ROS- SEE HPI  /70 (BP Location: Right arm, Patient Position: Sitting, Cuff Size: Adult Large)   Pulse 75   Temp 98  F (36.7  C) (Tympanic)   Resp 18   Ht 1.613 m (5' 3.5\")   Wt 108 kg (238 lb)   LMP  (LMP Unknown)   SpO2 97%   BMI 41.50 kg/m        General - The patient is well nourished and well developed, and appears to have good nutritional status.  Alert and oriented to person and place, answers questions and cooperates with examination appropriately.   Head and Face - Normocephalic and atraumatic, with no gross asymmetry noted.  The facial nerve is intact, with strong symmetric movements.  Voice and Breathing - The patient was breathing comfortably without the use of accessory muscles. There was no wheezing, stridor, or stertor.  The patients voice was clear and strong, and had appropriate pitch and quality.  Ears -The external auditory canals are patent, the tympanic membranes are intact without effusion, retraction or mass.  Bony landmarks are intact.  Eyes - Extraocular movements intact, and the pupils were reactive to light.  Sclera were not icteric or injected, conjunctiva were pink and moist.  Mouth - Examination of the oral cavity showed pink, healthy oral mucosa. No lesions or ulcerations noted.  The tongue was mobile and midline, and the dentition were in good condition.    Crowded posterior pharynx. FTP IV  Throat - The walls of the oropharynx were smooth, pink, moist, symmetric, and had no lesions or ulcerations.  The tonsillar pillars and soft palate were symmetric.  The uvula was midline on elevation.    Neck - Normal midline excursion of the laryngotracheal complex during swallowing.  Full range of motion on passive movement.  Palpation of the occipital, submental, submandibular, internal jugular chain, and supraclavicular nodes did not demonstrate any abnormal lymph nodes or masses. "  Palpation of the thyroid was soft and smooth, with no nodules or goiter appreciated.  The trachea was mobile and midline.     To evaluate the nose and sinuses in the post operative state, I performed rigid nasal endoscopy. The nose was anesthetized with home afrin or topical lidocaine and neosynephrine in the office.     I began with the LEFT side using a 0 degree rigid nasal endoscope, and then similarly examined the RIGHT side     Findings:  Septum: Midline. Right anterior septum- ulceration along septum. No active bleeding.   Inferior turbinates:  Mild edema.   Middle turbinate and middle meatus:  No purulence, no polyposis, no synechiae     Mucosa is  healthy throughout without polyps nor polypoid degeneration  Np is Clear. ET patent bilaterally.   No active drainage.        ASSESSMENT/ PLAN:    ICD-10-CM    1. Epistaxis  R04.0    2. Nasal septal ulcer  J34.0    3. Perennial allergic rhinitis  J30.89        Discussed options of nasal cauterization vs. Nasal cares. At this time, she wishes to try home cares.   She will start Nasal saline 3-4 times daily  Nasal Ayr gel TID  Hold Flonase, Budesonide rinses.     Return in 3-4 weeks. If ongoing epistaxis, cauterization will be recommended.   Consider CBC, INR if ongoing.         Lorelie Guzman PA-C  ENT  [facility], [place]

## 2021-11-18 ENCOUNTER — OFFICE VISIT (OUTPATIENT)
Dept: OTOLARYNGOLOGY | Facility: OTHER | Age: 66
End: 2021-11-18
Attending: PHYSICIAN ASSISTANT
Payer: MEDICARE

## 2021-11-18 VITALS
HEART RATE: 75 BPM | BODY MASS INDEX: 40.63 KG/M2 | WEIGHT: 238 LBS | SYSTOLIC BLOOD PRESSURE: 122 MMHG | HEIGHT: 64 IN | DIASTOLIC BLOOD PRESSURE: 70 MMHG | TEMPERATURE: 98 F | RESPIRATION RATE: 18 BRPM | OXYGEN SATURATION: 97 %

## 2021-11-18 DIAGNOSIS — J34.0 NASAL SEPTAL ULCER: ICD-10-CM

## 2021-11-18 DIAGNOSIS — R04.0 EPISTAXIS: Primary | ICD-10-CM

## 2021-11-18 DIAGNOSIS — J30.89 PERENNIAL ALLERGIC RHINITIS: ICD-10-CM

## 2021-11-18 PROCEDURE — 31231 NASAL ENDOSCOPY DX: CPT | Performed by: PHYSICIAN ASSISTANT

## 2021-11-18 PROCEDURE — 99213 OFFICE O/P EST LOW 20 MIN: CPT | Mod: 25 | Performed by: PHYSICIAN ASSISTANT

## 2021-11-18 PROCEDURE — G0463 HOSPITAL OUTPT CLINIC VISIT: HCPCS

## 2021-11-18 PROCEDURE — G0463 HOSPITAL OUTPT CLINIC VISIT: HCPCS | Mod: 25

## 2021-11-18 ASSESSMENT — MIFFLIN-ST. JEOR: SCORE: 1596.62

## 2021-11-18 ASSESSMENT — PAIN SCALES - GENERAL: PAINLEVEL: NO PAIN (0)

## 2021-11-18 NOTE — NURSING NOTE
"Chief Complaint   Patient presents with     Epistaxis     Pt is here for nosebleeds.       Initial /70 (BP Location: Right arm, Patient Position: Sitting, Cuff Size: Adult Large)   Pulse 75   Temp 98  F (36.7  C) (Tympanic)   Resp 18   Ht 1.613 m (5' 3.5\")   Wt 108 kg (238 lb)   LMP  (LMP Unknown)   SpO2 97%   BMI 41.50 kg/m   Estimated body mass index is 41.5 kg/m  as calculated from the following:    Height as of this encounter: 1.613 m (5' 3.5\").    Weight as of this encounter: 108 kg (238 lb).  Medication Reconciliation: complete  Livia Jesus LPN    "

## 2021-11-18 NOTE — LETTER
11/18/2021         RE: Laura Romero  [address]  [place] [address]        Dear Colleague,    Thank you for referring your patient, Laura Romero, to the [facility]. Please see a copy of my visit note below.        Patient presents to ENT for concerns of epistaxis.   She had noted right epistaxis. She has since noticed blood clots, mucous over the last few months, worsening over the last couple days.     She has been using Flonase, Budesonide rinses.   Her allergies and sinuses have overall been doing well.   Breathing has been well. No concerns for wheezing.      Hx of nasal septoplasty.   Hx of SCIT.      MQT- 10/3/19  Dilution #6-None  Dilution #5- Oak, Alternia, helmintherosoirum, dust.   Dilution #2- weeds, grass, trees, mold, cat, dog           Past Medical History:   Diagnosis Date     Bacteremia      12/04,Negative coag testing     Calculus of kidney     age 19 and age 50     Chronic sinusitis     5/20/2011     Deviated nasal septum     8/3/2012     Deviated nasal septum     sever turbinate hypertophy and superior septal deviation to right with recurrent sinusitis     Gastro-esophageal reflux disease without esophagitis     8/31/2009     Personal history of other medical treatment (CODE)     rectocele with cervical prolapse     Phlebitis and thrombophlebitis     1/6/11, after varicose vein ablation     Phlebitis and thrombophlebitis of lower extremities, unspecified (CODE)     1/6/2011,Greater saphenous vein left lower extremity     Pneumonia     7/27/2012        Allergies   Allergen Reactions     Azithromycin Hives     Zithromax       Current Outpatient Medications   Medication     albuterol (PROAIR HFA/PROVENTIL HFA/VENTOLIN HFA) 108 (90 Base) MCG/ACT inhaler     aspirin EC 81 MG EC tablet     budesonide (PULMICORT) 0.5 MG/2ML neb solution     cetirizine (ZYRTEC) 10 MG tablet     Cholecalciferol (VITAMIN D PO)     fluticasone (FLONASE) 50 MCG/ACT nasal spray      "Krill Oil 500 MG CAPS     levothyroxine (SYNTHROID/LEVOTHROID) 100 MCG tablet     lisinopril (ZESTRIL) 20 MG tablet     metroNIDAZOLE (METROGEL) 0.75 % external gel     Multiple Vitamin (MULTIVITAMIN) per tablet     other medical supplies     pravastatin (PRAVACHOL) 40 MG tablet     No current facility-administered medications for this visit.     >ROS- SEE HPI  /70 (BP Location: Right arm, Patient Position: Sitting, Cuff Size: Adult Large)   Pulse 75   Temp 98  F (36.7  C) (Tympanic)   Resp 18   Ht 1.613 m (5' 3.5\")   Wt 108 kg (238 lb)   LMP  (LMP Unknown)   SpO2 97%   BMI 41.50 kg/m        General - The patient is well nourished and well developed, and appears to have good nutritional status.  Alert and oriented to person and place, answers questions and cooperates with examination appropriately.   Head and Face - Normocephalic and atraumatic, with no gross asymmetry noted.  The facial nerve is intact, with strong symmetric movements.  Voice and Breathing - The patient was breathing comfortably without the use of accessory muscles. There was no wheezing, stridor, or stertor.  The patients voice was clear and strong, and had appropriate pitch and quality.  Ears -The external auditory canals are patent, the tympanic membranes are intact without effusion, retraction or mass.  Bony landmarks are intact.  Eyes - Extraocular movements intact, and the pupils were reactive to light.  Sclera were not icteric or injected, conjunctiva were pink and moist.  Mouth - Examination of the oral cavity showed pink, healthy oral mucosa. No lesions or ulcerations noted.  The tongue was mobile and midline, and the dentition were in good condition.    Crowded posterior pharynx. FTP IV  Throat - The walls of the oropharynx were smooth, pink, moist, symmetric, and had no lesions or ulcerations.  The tonsillar pillars and soft palate were symmetric.  The uvula was midline on elevation.    Neck - Normal midline excursion of " the laryngotracheal complex during swallowing.  Full range of motion on passive movement.  Palpation of the occipital, submental, submandibular, internal jugular chain, and supraclavicular nodes did not demonstrate any abnormal lymph nodes or masses.  Palpation of the thyroid was soft and smooth, with no nodules or goiter appreciated.  The trachea was mobile and midline.     To evaluate the nose and sinuses in the post operative state, I performed rigid nasal endoscopy. The nose was anesthetized with home afrin or topical lidocaine and neosynephrine in the office.     I began with the LEFT side using a 0 degree rigid nasal endoscope, and then similarly examined the RIGHT side     Findings:  Septum: Midline. Right anterior septum- ulceration along septum. No active bleeding.   Inferior turbinates:  Mild edema.   Middle turbinate and middle meatus:  No purulence, no polyposis, no synechiae     Mucosa is  healthy throughout without polyps nor polypoid degeneration  Np is Clear. ET patent bilaterally.   No active drainage.        ASSESSMENT/ PLAN:    ICD-10-CM    1. Epistaxis  R04.0    2. Nasal septal ulcer  J34.0    3. Perennial allergic rhinitis  J30.89        Discussed options of nasal cauterization vs. Nasal cares. At this time, she wishes to try home cares.   She will start Nasal saline 3-4 times daily  Nasal Ayr gel TID  Hold Flonase, Budesonide rinses.     Return in 3-4 weeks. If ongoing epistaxis, cauterization will be recommended.   Consider CBC, INR if ongoing.         Lorelei Guzman PA-C  ENT  [facility], [place]            Again, thank you for allowing me to participate in the care of your patient.        Sincerely,        Lorelei Guzman PA-C

## 2021-11-18 NOTE — PATIENT INSTRUCTIONS
Left ear was cleaned.   Normal ear exam.     Hold Flonase, Budesonide rinses.     Start Nasal saline, nasal Ayr gel.     Recheck nose in 3-4 weeks. If continued nosebleeds- will cauterize.     Daily Nasal Moisture Instructions    Keep your nose moist  Use ocean nasal spray, Ayr nasal saline or any other over the counter nasal saline at least 4-5 times a day for 2 weeks.    Use Ayr gel twice a day and at bedtime for 2 weeks.    If no further bleeding cut back to twice a day saline use and twice a day ayr gel use    If you have any severe allergies take a daily antihistamine (claritin, allergra, zyrtec or similar)    No heavy lifting over 10 pounds, no bending or straining for as long as possible.  Preferably for 2 weeks following a heavy bleed.    Stop any medications that may cause bleeding.  Contact your primary care physician with questions about medications and let them know if you stop a medication.          Epistaxis (nose bleed) Instructions    With heavy bleeding, start irrigating with very warm Sammy Med saline irrigation or any other warm saline.  Repeatedly irrigate with warm saline until heavy bleeding stops or improves.     Next gently blow nose and repeat irrigation.  Once the bleeding has slowed down, clamp nose for 5 minutes    Next apply Afrin (oxymetazoline) spray.  2 sprays to affected nostril.  Repeat after 5 minutes.    Next reapply clamp for 30 minutes.    Gargle and spit with warm saline to remove any clots or blood from your throat.    Lie down with head elevated to 45 degrees if possible for at least 15-30 minutes.    Continue to use Afrin nasal spray, 2 sprays twice a day to the nose for 4 days then stop.  Do not use Afrin long term.    Apply Ayr gel, bacitracin or bactroban antibiotic ointment to the nose twice a day and before bed.  Continue this for 4 days then start daily moisture instructions below.    If your bleeding cannot be controlled go to the closest Emergency room.    You may  continue to have some occasional oozing from the nose but the goal is to control the heavy bleeding.    You may also try over the counter NasalCEASE which is a safe packing that you can insert yourself at home.      Thank you for allowing Lorelei Guzman PA-C and our ENT team to participate in your care.  If your medications are too expensive, please give the nurse a call.  We can possibly change this medication.  If you have a scheduling or an appointment question please contact our Health Unit Coordinator at [phone], Ext. [phone].    ALL nursing questions or concerns can be directed to your ENT nurse at: [phone] Siri

## 2021-12-06 ENCOUNTER — ALLIED HEALTH/NURSE VISIT (OUTPATIENT)
Dept: FAMILY MEDICINE | Facility: OTHER | Age: 66
End: 2021-12-06
Attending: FAMILY MEDICINE
Payer: MEDICARE

## 2021-12-06 DIAGNOSIS — Z23 NEED FOR VACCINATION: Primary | ICD-10-CM

## 2021-12-06 PROCEDURE — G0009 ADMIN PNEUMOCOCCAL VACCINE: HCPCS

## 2021-12-06 NOTE — PROGRESS NOTES
Immunization Documentation  Verified patient's first and last name, and . Stated reason for visit today is to receive the Pneumovax vaccine. Accompained to clinic visit with self. Denied any concerns with previous immunizations. Allergies reviewed. VIS handout(s) reviewed and given to take home. vaccine prepared and administered per standing order. Administration documented in IMMUNIZATIONS (see flowsheet and order for further information). pt Instructed to wait in lobby for 15 minutes post-injection and notify staff immediately of any reaction.     Tonya Gutierrez RN ....................  2021   10:04 AM

## 2021-12-17 ENCOUNTER — OFFICE VISIT (OUTPATIENT)
Dept: OTOLARYNGOLOGY | Facility: OTHER | Age: 66
End: 2021-12-17
Attending: PHYSICIAN ASSISTANT
Payer: COMMERCIAL

## 2021-12-17 VITALS
BODY MASS INDEX: 40.63 KG/M2 | DIASTOLIC BLOOD PRESSURE: 72 MMHG | TEMPERATURE: 97.6 F | WEIGHT: 238 LBS | SYSTOLIC BLOOD PRESSURE: 126 MMHG | HEART RATE: 75 BPM | HEIGHT: 64 IN | OXYGEN SATURATION: 97 %

## 2021-12-17 DIAGNOSIS — J34.0 NASAL SEPTAL ULCER: Primary | ICD-10-CM

## 2021-12-17 DIAGNOSIS — Z87.898 H/O EPISTAXIS: ICD-10-CM

## 2021-12-17 PROCEDURE — 99213 OFFICE O/P EST LOW 20 MIN: CPT | Performed by: PHYSICIAN ASSISTANT

## 2021-12-17 PROCEDURE — G0463 HOSPITAL OUTPT CLINIC VISIT: HCPCS

## 2021-12-17 ASSESSMENT — PAIN SCALES - GENERAL: PAINLEVEL: NO PAIN (0)

## 2021-12-17 ASSESSMENT — MIFFLIN-ST. JEOR: SCORE: 1596.62

## 2021-12-17 NOTE — NURSING NOTE
"Chief Complaint   Patient presents with     Epistaxis     Pt is here for a f/u epistaxis and nasal septal spur.       Initial /72 (Cuff Size: Adult Large)   Pulse 75   Temp 97.6  F (36.4  C) (Tympanic)   Ht 1.613 m (5' 3.5\")   Wt 108 kg (238 lb)   LMP  (LMP Unknown)   SpO2 97%   BMI 41.50 kg/m   Estimated body mass index is 41.5 kg/m  as calculated from the following:    Height as of this encounter: 1.613 m (5' 3.5\").    Weight as of this encounter: 108 kg (238 lb).  Medication Reconciliation: complete  Rakel Delvalle LPN    "

## 2021-12-17 NOTE — PROGRESS NOTES
Chief Complaint   Patient presents with     Epistaxis     Pt is here for a f/u epistaxis and nasal septal spur.         Patient presents to ENT for concerns of epistaxis.   She had noted right epistaxis. At her last visit, she was started on nasal cares and recommended to return if ongoing issues. Today, she ahs been doing well   She had noticed there is less bleeding, congestion.   Reports there were small blood at times.   She has been using nasal saline and nasal ayr gel.       No recent use of rinses.   Her allergies and sinuses have overall been doing well.   Breathing has been well. No concerns for wheezing.   Hx of nasal septoplasty.  Hx of SCIT.      MQT- 10/3/19  Dilution #6-None  Dilution #5- Oak, Alternia, helmintherosoirum, dust.   Dilution #2- weeds, grass, trees, mold, cat, dog        Past Medical History:   Diagnosis Date     Bacteremia      12/04,Negative coag testing     Calculus of kidney     age 19 and age 50     Chronic sinusitis     5/20/2011     Deviated nasal septum     8/3/2012     Deviated nasal septum     sever turbinate hypertophy and superior septal deviation to right with recurrent sinusitis     Gastro-esophageal reflux disease without esophagitis     8/31/2009     Personal history of other medical treatment (CODE)     rectocele with cervical prolapse     Phlebitis and thrombophlebitis     1/6/11, after varicose vein ablation     Phlebitis and thrombophlebitis of lower extremities, unspecified (CODE)     1/6/2011,Greater saphenous vein left lower extremity     Pneumonia     7/27/2012        Allergies   Allergen Reactions     Azithromycin Hives     Zithromax       Current Outpatient Medications   Medication     albuterol (PROAIR HFA/PROVENTIL HFA/VENTOLIN HFA) 108 (90 Base) MCG/ACT inhaler     aspirin EC 81 MG EC tablet     budesonide (PULMICORT) 0.5 MG/2ML neb solution     cetirizine (ZYRTEC) 10 MG tablet     Cholecalciferol (VITAMIN D PO)     fluticasone (FLONASE) 50 MCG/ACT nasal spray  "    Krill Oil 500 MG CAPS     levothyroxine (SYNTHROID/LEVOTHROID) 100 MCG tablet     lisinopril (ZESTRIL) 20 MG tablet     metroNIDAZOLE (METROGEL) 0.75 % external gel     Multiple Vitamin (MULTIVITAMIN) per tablet     other medical supplies     pravastatin (PRAVACHOL) 40 MG tablet     No current facility-administered medications for this visit.     ROS- SEE HPI  /72 (Cuff Size: Adult Large)   Pulse 75   Temp 97.6  F (36.4  C) (Tympanic)   Ht 1.613 m (5' 3.5\")   Wt 108 kg (238 lb)   LMP  (LMP Unknown)   SpO2 97%   BMI 41.50 kg/m      General - The patient is well nourished and well developed, and appears to have good nutritional status.  Alert and oriented to person and place, answers questions and cooperates with examination appropriately.   Head and Face - Normocephalic and atraumatic, with no gross asymmetry noted.  The facial nerve is intact, with strong symmetric movements.  Voice and Breathing - The patient was breathing comfortably without the use of accessory muscles. There was no wheezing, stridor, or stertor.  The patients voice was clear and strong, and had appropriate pitch and quality.  Ears -The external auditory canals are patent, the tympanic membranes are intact without effusion, retraction or mass.  Bony landmarks are intact.  Eyes - Extraocular movements intact, and the pupils were reactive to light.  Sclera were not icteric or injected, conjunctiva were pink and moist.  Mouth - Examination of the oral cavity showed pink, healthy oral mucosa. No lesions or ulcerations noted.  The tongue was mobile and midline, and the dentition were in good condition.    Crowded posterior pharynx. FTP IV  Throat - The walls of the oropharynx were smooth, pink, moist, symmetric, and had no lesions or ulcerations.  The tonsillar pillars and soft palate were symmetric.  The uvula was midline on elevation.    Neck - Normal midline excursion of the laryngotracheal complex during swallowing.  Full range of " motion on passive movement.  Palpation of the occipital, submental, submandibular, internal jugular chain, and supraclavicular nodes did not demonstrate any abnormal lymph nodes or masses.  Palpation of the thyroid was soft and smooth, with no nodules or goiter appreciated.  The trachea was mobile and midline.       Septum: Midline. Right anterior septum- ulceration along septum. No active bleeding.        ASSESSMENT/ PLAN:    ICD-10-CM    1. Nasal septal ulcer  J34.0    2. H/O epistaxis  Z87.898      Use Nasal saline 2 sprays to each nostril 2 times daily  Continue with Nasal Ayr gel.   Humidifiers  Use Sammy med rinse as needed   Use Budesonide rinses if increase in nasal congestion/ sinus pressure.       Return to ENT if recurrent epistaxis.             Lorelei Guzman PA-C  ENT  [facility], [place]

## 2021-12-17 NOTE — PATIENT INSTRUCTIONS
Use Nasal saline 2 sprays to each nostril 2 times daily  Continue with Nasal Ayr gel.   Humidifiers  Use Sammy med rinse as needed   Use Budesonide rinses if increase in nasal congestion/ sinus pressure.         Thank you for allowing Lorelei Guzman PA-C and our ENT team to participate in your care.  If your medications are too expensive, please give the nurse a call.  We can possibly change this medication.  If you have a scheduling or an appointment question please contact our Health Unit Coordinator at [phone], Ext. [phone].    ALL nursing questions or concerns can be directed to your ENT nurse at: [phone] Siri

## 2021-12-20 ENCOUNTER — MYC MEDICAL ADVICE (OUTPATIENT)
Dept: FAMILY MEDICINE | Facility: OTHER | Age: 66
End: 2021-12-20
Payer: COMMERCIAL

## 2021-12-20 DIAGNOSIS — E78.00 HYPERCHOLESTEROLEMIA: Primary | ICD-10-CM

## 2021-12-20 RX ORDER — ATORVASTATIN CALCIUM 80 MG/1
80 TABLET, FILM COATED ORAL DAILY
Qty: 90 TABLET | Refills: 3 | Status: SHIPPED | OUTPATIENT
Start: 2021-12-20 | End: 2021-12-23

## 2021-12-22 NOTE — TELEPHONE ENCOUNTER
12/20/21 0935 Gloria Watts MD      atorvastatin (LIPITOR) 80 MG tablet 90 tablet 3 12/20/2021  --   Sig - Route: Take 1 tablet (80 mg) by mouth daily - Oral   Sent to pharmacy as: Atorvastatin Calcium 80 MG Oral Tablet (LIPITOR)   Class: E-Prescribe   Notes to Pharmacy: Pravastatin ineffective.  Switching to stronger statin.   Order: [id number]   E-Prescribing Status: Transmission to pharmacy failed (12/20/2021  9:36 AM CST)     [id number] - [place], [place] - [address]     Please resend Rx.     Nellie Alonzo RN .............. 12/22/2021  4:13 PM

## 2021-12-23 RX ORDER — ATORVASTATIN CALCIUM 80 MG/1
80 TABLET, FILM COATED ORAL DAILY
Qty: 90 TABLET | Refills: 3 | Status: SHIPPED | OUTPATIENT
Start: 2021-12-23 | End: 2022-10-18

## 2022-01-10 ENCOUNTER — HOSPITAL ENCOUNTER (OUTPATIENT)
Dept: CT IMAGING | Facility: OTHER | Age: 67
Discharge: HOME | End: 2022-01-10
Attending: FAMILY MEDICINE | Admitting: FAMILY MEDICINE

## 2022-01-10 DIAGNOSIS — Z82.49 FAMILY HISTORY OF ISCHEMIC HEART DISEASE: ICD-10-CM

## 2022-01-10 PROCEDURE — 75571 CT HRT W/O DYE W/CA TEST: CPT | Mod: MG

## 2022-01-19 ENCOUNTER — OFFICE VISIT (OUTPATIENT)
Dept: OTOLARYNGOLOGY | Facility: OTHER | Age: 67
End: 2022-01-19
Attending: PHYSICIAN ASSISTANT
Payer: MEDICARE

## 2022-01-19 VITALS
HEIGHT: 64 IN | SYSTOLIC BLOOD PRESSURE: 120 MMHG | BODY MASS INDEX: 40.12 KG/M2 | DIASTOLIC BLOOD PRESSURE: 70 MMHG | WEIGHT: 235 LBS | TEMPERATURE: 97.9 F | OXYGEN SATURATION: 95 % | HEART RATE: 82 BPM

## 2022-01-19 DIAGNOSIS — J34.89 NASAL SEPTAL GRANULOMA: ICD-10-CM

## 2022-01-19 DIAGNOSIS — R04.0 EPISTAXIS: Primary | ICD-10-CM

## 2022-01-19 LAB
ERYTHROCYTE [DISTWIDTH] IN BLOOD BY AUTOMATED COUNT: 14.8 % (ref 10–15)
HCT VFR BLD AUTO: 45.5 % (ref 35–47)
HGB BLD-MCNC: 14.9 G/DL (ref 11.7–15.7)
MCH RBC QN AUTO: 29.5 PG (ref 26.5–33)
MCHC RBC AUTO-ENTMCNC: 32.7 G/DL (ref 31.5–36.5)
MCV RBC AUTO: 90 FL (ref 78–100)
PLATELET # BLD AUTO: 249 10E3/UL (ref 150–450)
RBC # BLD AUTO: 5.05 10E6/UL (ref 3.8–5.2)
WBC # BLD AUTO: 11.9 10E3/UL (ref 4–11)

## 2022-01-19 PROCEDURE — 31238 NSL/SINS NDSC SRG NSL HEMRRG: CPT | Performed by: PHYSICIAN ASSISTANT

## 2022-01-19 PROCEDURE — 31231 NASAL ENDOSCOPY DX: CPT | Performed by: PHYSICIAN ASSISTANT

## 2022-01-19 PROCEDURE — 85027 COMPLETE CBC AUTOMATED: CPT | Mod: ZL | Performed by: PHYSICIAN ASSISTANT

## 2022-01-19 PROCEDURE — 36415 COLL VENOUS BLD VENIPUNCTURE: CPT | Mod: ZL | Performed by: PHYSICIAN ASSISTANT

## 2022-01-19 PROCEDURE — G0463 HOSPITAL OUTPT CLINIC VISIT: HCPCS | Mod: 25

## 2022-01-19 PROCEDURE — 30901 CONTROL OF NOSEBLEED: CPT | Performed by: PHYSICIAN ASSISTANT

## 2022-01-19 PROCEDURE — 99213 OFFICE O/P EST LOW 20 MIN: CPT | Mod: 25 | Performed by: PHYSICIAN ASSISTANT

## 2022-01-19 ASSESSMENT — MIFFLIN-ST. JEOR: SCORE: 1585.95

## 2022-01-19 ASSESSMENT — PAIN SCALES - GENERAL: PAINLEVEL: NO PAIN (0)

## 2022-01-19 NOTE — PATIENT INSTRUCTIONS
Complete CBC.   Continue with nasal cares.   Use afrin 2 sprays to each nostril 3 times a day for 2 days, then 2 times daily for 2 days. Then hold.     Follow up in 2 weeks    This included strict restrictions on nose-blowing, manipulation, picking, and open-mouth sneezing.  Also, sleeping with the head elevated, and avoidance of strenuous activity, heavy lifting, and bending over were discussed.    Thank you for allowing Lorelei Guzman PA-C and our ENT team to participate in your care.  If your medications are too expensive, please give the nurse a call.  We can possibly change this medication.  If you have a scheduling or an appointment question please contact our Health Unit Coordinator at [phone], Ext. [phone].    ALL nursing questions or concerns can be directed to your ENT nurse at: [phone] Siri

## 2022-01-19 NOTE — NURSING NOTE
"Chief Complaint   Patient presents with     Epistaxis     Nosebleeds       Initial /70 (BP Location: Right arm, Cuff Size: Adult Large)   Pulse 82   Temp 97.9  F (36.6  C) (Tympanic)   Ht 1.626 m (5' 4\")   Wt 106.6 kg (235 lb)   LMP  (LMP Unknown)   SpO2 95%   BMI 40.34 kg/m   Estimated body mass index is 40.34 kg/m  as calculated from the following:    Height as of this encounter: 1.626 m (5' 4\").    Weight as of this encounter: 106.6 kg (235 lb).  Medication Reconciliation: complete  Siri Westbrook LPN    "

## 2022-01-19 NOTE — PROGRESS NOTES
Chief Complaint   Patient presents with     Epistaxis     Nosebleeds         Patient returns for recheck of her nose. She was last seen on 12/17  Patient presents to ENT for concerns of epistaxis.   She had noted right epistaxis. At her last visit, she was started on nasal cares and recommended to return if ongoing issues. Today, she ahs been doing well   Over the last week, she has had increase in epistaxis. She had a large clot from her nose, right side. She does try to clear the drainage  She had larger clots that expelled. Reports it does stop with direct pressure.           No recent use of rinses.   Her allergies and sinuses have overall been doing well.   Breathing has been well. No concerns for wheezing.   Hx of nasal septoplasty.  Hx of SCIT.   No blood thinners.       MQT- 10/3/19  Dilution #6-None  Dilution #5- Oak, Alternia, helmintherosoirum, dust.   Dilution #2- weeds, grass, trees, mold, cat, dog       Past Medical History:   Diagnosis Date     Bacteremia      12/04,Negative coag testing     Calculus of kidney     age 19 and age 50     Chronic sinusitis     5/20/2011     Deviated nasal septum     8/3/2012     Deviated nasal septum     sever turbinate hypertophy and superior septal deviation to right with recurrent sinusitis     Gastro-esophageal reflux disease without esophagitis     8/31/2009     Personal history of other medical treatment (CODE)     rectocele with cervical prolapse     Phlebitis and thrombophlebitis     1/6/11, after varicose vein ablation     Phlebitis and thrombophlebitis of lower extremities, unspecified (CODE)     1/6/2011,Greater saphenous vein left lower extremity     Pneumonia     7/27/2012        Allergies   Allergen Reactions     Azithromycin Hives     Zithromax       Current Outpatient Medications   Medication     albuterol (PROAIR HFA/PROVENTIL HFA/VENTOLIN HFA) 108 (90 Base) MCG/ACT inhaler     aspirin EC 81 MG EC tablet     atorvastatin (LIPITOR) 80 MG tablet      "budesonide (PULMICORT) 0.5 MG/2ML neb solution     cetirizine (ZYRTEC) 10 MG tablet     Cholecalciferol (VITAMIN D PO)     fluticasone (FLONASE) 50 MCG/ACT nasal spray     Krill Oil 500 MG CAPS     levothyroxine (SYNTHROID/LEVOTHROID) 100 MCG tablet     lisinopril (ZESTRIL) 20 MG tablet     metroNIDAZOLE (METROGEL) 0.75 % external gel     Multiple Vitamin (MULTIVITAMIN) per tablet     other medical supplies     No current facility-administered medications for this visit.     ROS- SEE HPI  /70 (BP Location: Right arm, Cuff Size: Adult Large)   Pulse 82   Temp 97.9  F (36.6  C) (Tympanic)   Ht 1.626 m (5' 4\")   Wt 106.6 kg (235 lb)   LMP  (LMP Unknown)   SpO2 95%   BMI 40.34 kg/m      General - The patient is well nourished and well developed, and appears to have good nutritional status.  Alert and oriented to person and place, answers questions and cooperates with examination appropriately.   Head and Face - Normocephalic and atraumatic, with no gross asymmetry noted.  The facial nerve is intact, with strong symmetric movements.  Voice and Breathing - The patient was breathing comfortably without the use of accessory muscles. There was no wheezing, stridor, or stertor.  The patients voice was clear and strong, and had appropriate pitch and quality.  Ears -The external auditory canals are patent, the tympanic membranes are intact without effusion, retraction or mass.  Bony landmarks are intact.  Eyes - Extraocular movements intact, and the pupils were reactive to light.  Sclera were not icteric or injected, conjunctiva were pink and moist.  Mouth - Examination of the oral cavity showed pink, healthy oral mucosa. No lesions or ulcerations noted.  The tongue was mobile and midline, and the dentition were in good condition.    Crowded posterior pharynx. FTP IV  Throat - The walls of the oropharynx were smooth, pink, moist, symmetric, and had no lesions or ulcerations.  The tonsillar pillars and soft palate " were symmetric.  The uvula was midline on elevation.    Neck - Normal midline excursion of the laryngotracheal complex during swallowing.  Full range of motion on passive movement.  Palpation of the occipital, submental, submandibular, internal jugular chain, and supraclavicular nodes did not demonstrate any abnormal lymph nodes or masses.  Palpation of the thyroid was soft and smooth, with no nodules or goiter appreciated.  The trachea was mobile and midline.     Nose- Small ulcerated granuloma. Appears extending from right central septum.     To evaluate the nose and sinuses in the post operative state, I performed rigid nasal endoscopy. The nose was anesthetized with home afrin or topical lidocaine and neosynephrine in the office.     I began with the LEFT side using a 0 degree rigid nasal endoscope, and then similarly examined the RIGHT side     Findings:  Septum: Midline. Right anterior septum- ulceration/ granuloma present. Crusted. Suctioned with and instant oozing.   Inferior turbinates:  Mild edema.   Middle turbinate and middle meatus:  No purulence, no polyposis, no synechiae     Mucosa is  healthy throughout without polyps nor polypoid degeneration  Np is Clear. ET patent bilaterally.   No active drainage.        Nasal Cautery - Options were explained to the patient regarding conservative measures versus nasal cautery in the clinic today.  The patient wished to proceed with cautery. I injected with local lidocaine with epinephrine 1:100,000 to the right septum. This allowed for improved control of active epistaxis. Tissue was reduced with curved iris. Cauterization completed to location. I placed a 1/2 piece of nasopore over location. The patient tolerated the procedure well.            ASSESSMENT/ PLAN    ICD-10-CM    1. Epistaxis  R04.0 CBC with platelets     CBC with platelets   2. 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     Continue with nasal cares.   Use afrin 2 sprays to each nostril  3 times a day for 2 days, then 2 times daily for 2 days. Then hold.   Continue with nasal cares.   Follow up in 2 weeks    This included strict restrictions on nose-blowing, manipulation, picking, and open-mouth sneezing.  Also, sleeping with the head elevated, and avoidance of strenuous activity, heavy lifting, and bending over were discussed.        Lorelei Guzman PA-C  ENT  [facility]

## 2022-01-19 NOTE — LETTER
1/19/2022         RE: Laura Romero  [address] [place] [address]        Dear Colleague,    Thank you for referring your patient, Laura Romero, to the [facility]. Please see a copy of my visit note below.    Chief Complaint   Patient presents with     Epistaxis     Nosebleeds         Patient returns for recheck of her nose. She was last seen on 12/17  Patient presents to ENT for concerns of epistaxis.   She had noted right epistaxis. At her last visit, she was started on nasal cares and recommended to return if ongoing issues. Today, she ahs been doing well   Over the last week, she has had increase in epistaxis. She had a large clot from her nose, right side. She does try to clear the drainage  She had larger clots that expelled. Reports it does stop with direct pressure.           No recent use of rinses.   Her allergies and sinuses have overall been doing well.   Breathing has been well. No concerns for wheezing.   Hx of nasal septoplasty.  Hx of SCIT.   No blood thinners.       MQT- 10/3/19  Dilution #6-None  Dilution #5- Oak, Alternia, helmintherosoirum, dust.   Dilution #2- weeds, grass, trees, mold, cat, dog       Past Medical History:   Diagnosis Date     Bacteremia      12/04,Negative coag testing     Calculus of kidney     age 19 and age 50     Chronic sinusitis     5/20/2011     Deviated nasal septum     8/3/2012     Deviated nasal septum     sever turbinate hypertophy and superior septal deviation to right with recurrent sinusitis     Gastro-esophageal reflux disease without esophagitis     8/31/2009     Personal history of other medical treatment (CODE)     rectocele with cervical prolapse     Phlebitis and thrombophlebitis     1/6/11, after varicose vein ablation     Phlebitis and thrombophlebitis of lower extremities, unspecified (CODE)     1/6/2011,Greater saphenous vein left lower extremity     Pneumonia     7/27/2012        Allergies   Allergen  "Reactions     Azithromycin Hives     Zithromax       Current Outpatient Medications   Medication     albuterol (PROAIR HFA/PROVENTIL HFA/VENTOLIN HFA) 108 (90 Base) MCG/ACT inhaler     aspirin EC 81 MG EC tablet     atorvastatin (LIPITOR) 80 MG tablet     budesonide (PULMICORT) 0.5 MG/2ML neb solution     cetirizine (ZYRTEC) 10 MG tablet     Cholecalciferol (VITAMIN D PO)     fluticasone (FLONASE) 50 MCG/ACT nasal spray     Krill Oil 500 MG CAPS     levothyroxine (SYNTHROID/LEVOTHROID) 100 MCG tablet     lisinopril (ZESTRIL) 20 MG tablet     metroNIDAZOLE (METROGEL) 0.75 % external gel     Multiple Vitamin (MULTIVITAMIN) per tablet     other medical supplies     No current facility-administered medications for this visit.     ROS- SEE HPI  /70 (BP Location: Right arm, Cuff Size: Adult Large)   Pulse 82   Temp 97.9  F (36.6  C) (Tympanic)   Ht 1.626 m (5' 4\")   Wt 106.6 kg (235 lb)   LMP  (LMP Unknown)   SpO2 95%   BMI 40.34 kg/m      General - The patient is well nourished and well developed, and appears to have good nutritional status.  Alert and oriented to person and place, answers questions and cooperates with examination appropriately.   Head and Face - Normocephalic and atraumatic, with no gross asymmetry noted.  The facial nerve is intact, with strong symmetric movements.  Voice and Breathing - The patient was breathing comfortably without the use of accessory muscles. There was no wheezing, stridor, or stertor.  The patients voice was clear and strong, and had appropriate pitch and quality.  Ears -The external auditory canals are patent, the tympanic membranes are intact without effusion, retraction or mass.  Bony landmarks are intact.  Eyes - Extraocular movements intact, and the pupils were reactive to light.  Sclera were not icteric or injected, conjunctiva were pink and moist.  Mouth - Examination of the oral cavity showed pink, healthy oral mucosa. No lesions or ulcerations noted.  The " tongue was mobile and midline, and the dentition were in good condition.    Crowded posterior pharynx. FTP IV  Throat - The walls of the oropharynx were smooth, pink, moist, symmetric, and had no lesions or ulcerations.  The tonsillar pillars and soft palate were symmetric.  The uvula was midline on elevation.    Neck - Normal midline excursion of the laryngotracheal complex during swallowing.  Full range of motion on passive movement.  Palpation of the occipital, submental, submandibular, internal jugular chain, and supraclavicular nodes did not demonstrate any abnormal lymph nodes or masses.  Palpation of the thyroid was soft and smooth, with no nodules or goiter appreciated.  The trachea was mobile and midline.     Nose- Small ulcerated granuloma. Appears extending from right central septum.     To evaluate the nose and sinuses in the post operative state, I performed rigid nasal endoscopy. The nose was anesthetized with home afrin or topical lidocaine and neosynephrine in the office.     I began with the LEFT side using a 0 degree rigid nasal endoscope, and then similarly examined the RIGHT side     Findings:  Septum: Midline. Right anterior septum- ulceration/ granuloma present. Crusted. Suctioned with and instant oozing.   Inferior turbinates:  Mild edema.   Middle turbinate and middle meatus:  No purulence, no polyposis, no synechiae     Mucosa is  healthy throughout without polyps nor polypoid degeneration  Np is Clear. ET patent bilaterally.   No active drainage.        Nasal Cautery - Options were explained to the patient regarding conservative measures versus nasal cautery in the clinic today.  The patient wished to proceed with cautery. I injected with local lidocaine with epinephrine 1:100,000 to the right septum. This allowed for improved control of active epistaxis. Tissue was reduced with curved iris. Cauterization completed to location. I placed a 1/2 piece of nasopore over location. The patient  tolerated the procedure well.            ASSESSMENT/ PLAN    ICD-10-CM    1. Epistaxis  R04.0 CBC with platelets     CBC with platelets   2. 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     Continue with nasal cares.   Use afrin 2 sprays to each nostril 3 times a day for 2 days, then 2 times daily for 2 days. Then hold.   Continue with nasal cares.   Follow up in 2 weeks    This included strict restrictions on nose-blowing, manipulation, picking, and open-mouth sneezing.  Also, sleeping with the head elevated, and avoidance of strenuous activity, heavy lifting, and bending over were discussed.        Lorelei Guzman PA-C  ENT  [facility], [place]          Again, thank you for allowing me to participate in the care of your patient.        Sincerely,        Lorelei Guzman PA-C

## 2022-02-03 ENCOUNTER — OFFICE VISIT (OUTPATIENT)
Dept: OTOLARYNGOLOGY | Facility: OTHER | Age: 67
End: 2022-02-03
Attending: PHYSICIAN ASSISTANT
Payer: MEDICARE

## 2022-02-03 VITALS
HEART RATE: 74 BPM | SYSTOLIC BLOOD PRESSURE: 110 MMHG | BODY MASS INDEX: 40.12 KG/M2 | OXYGEN SATURATION: 98 % | TEMPERATURE: 98 F | DIASTOLIC BLOOD PRESSURE: 62 MMHG | HEIGHT: 64 IN | WEIGHT: 235 LBS

## 2022-02-03 DIAGNOSIS — Z87.898 H/O EPISTAXIS: Primary | ICD-10-CM

## 2022-02-03 PROCEDURE — 99212 OFFICE O/P EST SF 10 MIN: CPT | Performed by: PHYSICIAN ASSISTANT

## 2022-02-03 PROCEDURE — G0463 HOSPITAL OUTPT CLINIC VISIT: HCPCS

## 2022-02-03 ASSESSMENT — MIFFLIN-ST. JEOR: SCORE: 1585.95

## 2022-02-03 NOTE — PATIENT INSTRUCTIONS
Continue with nasal saline and nasal ayr gel.   If increase in nasal congestion- use warm Sammy Med rinses.   Septum- healing site of cautery.   If any concerns - contact [facility].       Thank you for allowing Lorelei Guzmna PA-C and our ENT team to participate in your care.  If your medications are too expensive, please give the nurse a call.  We can possibly change this medication.  If you have a scheduling or an appointment question please contact our Health Unit Coordinator at [phone], Ext. [phone].    ALL nursing questions or concerns can be directed to your ENT nurse at: [phone] [facility]

## 2022-02-03 NOTE — LETTER
2/3/2022         RE: Laura Romero  [address]  [place] [address]        Dear Colleague,    Thank you for referring your patient, Laura Romero, to the [facility]. Please see a copy of my visit note below.    Chief Complaint   Patient presents with     Epistaxis     Follow up epistaxis.  Cautery and nasopore.       Patient returns to ENT for recheck. She was last seen on 1/19/22 and had a small nasal granuloma on examination. She had excision, cauterization and packing placement.     Today, Laura returns for recheck of her nose. She was doing well until Saturday, and then had right sided epistaxis. She reports one episode of epistaxis last weekend. Reported bleeding lasted for about 15 minutes and used the rinse which resolved.     No further bleeding.   She has been using nasal saline and Aquaphor.       Her allergies and sinuses have overall been doing well.   Breathing has been well. No concerns for wheezing.   Hx of nasal septoplasty.  Hx of SCIT.   No blood thinners.         MQT- 10/3/19  Dilution #6-None  Dilution #5- Oak, Alternia, helmintherosoirum, dust.   Dilution #2- weeds, grass, trees, mold, cat, dog    Past Medical History:   Diagnosis Date     Bacteremia      12/04,Negative coag testing     Calculus of kidney     age 19 and age 50     Chronic sinusitis     5/20/2011     Deviated nasal septum     8/3/2012     Deviated nasal septum     sever turbinate hypertophy and superior septal deviation to right with recurrent sinusitis     Gastro-esophageal reflux disease without esophagitis     8/31/2009     Personal history of other medical treatment (CODE)     rectocele with cervical prolapse     Phlebitis and thrombophlebitis     1/6/11, after varicose vein ablation     Phlebitis and thrombophlebitis of lower extremities, unspecified (CODE)     1/6/2011,Greater saphenous vein left lower extremity     Pneumonia     7/27/2012        Allergies   Allergen Reactions      "Azithromycin Hives     Zithromax       Current Outpatient Medications   Medication     albuterol (PROAIR HFA/PROVENTIL HFA/VENTOLIN HFA) 108 (90 Base) MCG/ACT inhaler     aspirin EC 81 MG EC tablet     atorvastatin (LIPITOR) 80 MG tablet     budesonide (PULMICORT) 0.5 MG/2ML neb solution     cetirizine (ZYRTEC) 10 MG tablet     Cholecalciferol (VITAMIN D PO)     fluticasone (FLONASE) 50 MCG/ACT nasal spray     Krill Oil 500 MG CAPS     levothyroxine (SYNTHROID/LEVOTHROID) 100 MCG tablet     lisinopril (ZESTRIL) 20 MG tablet     metroNIDAZOLE (METROGEL) 0.75 % external gel     Multiple Vitamin (MULTIVITAMIN) per tablet     other medical supplies     No current facility-administered medications for this visit.     ROS- SEE HPI  /62 (BP Location: Right arm, Patient Position: Sitting, Cuff Size: Adult Large)   Pulse 74   Temp 98  F (36.7  C) (Tympanic)   Ht 1.626 m (5' 4\")   Wt 106.6 kg (235 lb)   LMP  (LMP Unknown)   SpO2 98%   BMI 40.34 kg/m        General - The patient is well nourished and well developed, and appears to have good nutritional status.  Alert and oriented to person and place, answers questions and cooperates with examination appropriately.   Head and Face - Normocephalic and atraumatic, with no gross asymmetry noted.  The facial nerve is intact, with strong symmetric movements.  Voice and Breathing - The patient was breathing comfortably without the use of accessory muscles. There was no wheezing, stridor, or stertor.  The patients voice was clear and strong, and had appropriate pitch and quality.  Ears -The external auditory canals are patent, the tympanic membranes are intact without effusion, retraction or mass.  Bony landmarks are intact.  Eyes - Extraocular movements intact, and the pupils were reactive to light.  Sclera were not icteric or injected, conjunctiva were pink and moist.  Mouth - Examination of the oral cavity showed pink, healthy oral mucosa. No lesions or ulcerations " noted.  The tongue was mobile and midline, and the dentition were in good condition.    Crowded posterior pharynx. FTP IV  Throat - The walls of the oropharynx were smooth, pink, moist, symmetric, and had no lesions or ulcerations.  The tonsillar pillars and soft palate were symmetric.  The uvula was midline on elevation.    Neck - Normal midline excursion of the laryngotracheal complex during swallowing.  Full range of motion on passive movement.  Palpation of the occipital, submental, submandibular, internal jugular chain, and supraclavicular nodes did not demonstrate any abnormal lymph nodes or masses.  Palpation of the thyroid was soft and smooth, with no nodules or goiter appreciated.  The trachea was mobile and midline.     Nose- Right anterior septum with healing site of cautery. No active bleeding.   No packing noted on exam.         ASSESSMENT/ PLAN:      ICD-10-CM    1. H/O epistaxis  Z87.898      Recommended allowing site to continue to heal.     Continue with nasal saline and nasal ayr gel.   If increase in nasal congestion- use warm Sammy Med rinses.   Septum- healing site of cautery.   If any concerns - contact nurse.           Lorelei Guzman PA-C  ENT  [facility], [place]             Again, thank you for allowing me to participate in the care of your patient.        Sincerely,        Lorelei Guzman PA-C

## 2022-02-03 NOTE — PROGRESS NOTES
Chief Complaint   Patient presents with     Epistaxis     Follow up epistaxis.  Cautery and nasopore.       Patient returns to ENT for recheck. She was last seen on 1/19/22 and had a small nasal granuloma on examination. She had excision, cauterization and packing placement.     Today, Laura returns for recheck of her nose. She was doing well until Saturday, and then had right sided epistaxis. She reports one episode of epistaxis last weekend. Reported bleeding lasted for about 15 minutes and used the rinse which resolved.     No further bleeding.   She has been using nasal saline and Aquaphor.       Her allergies and sinuses have overall been doing well.   Breathing has been well. No concerns for wheezing.   Hx of nasal septoplasty.  Hx of SCIT.   No blood thinners.         MQT- 10/3/19  Dilution #6-None  Dilution #5- Oak, Alternia, helmintherosoirum, dust.   Dilution #2- weeds, grass, trees, mold, cat, dog    Past Medical History:   Diagnosis Date     Bacteremia      12/04,Negative coag testing     Calculus of kidney     age 19 and age 50     Chronic sinusitis     5/20/2011     Deviated nasal septum     8/3/2012     Deviated nasal septum     sever turbinate hypertophy and superior septal deviation to right with recurrent sinusitis     Gastro-esophageal reflux disease without esophagitis     8/31/2009     Personal history of other medical treatment (CODE)     rectocele with cervical prolapse     Phlebitis and thrombophlebitis     1/6/11, after varicose vein ablation     Phlebitis and thrombophlebitis of lower extremities, unspecified (CODE)     1/6/2011,Greater saphenous vein left lower extremity     Pneumonia     7/27/2012        Allergies   Allergen Reactions     Azithromycin Hives     Zithromax       Current Outpatient Medications   Medication     albuterol (PROAIR HFA/PROVENTIL HFA/VENTOLIN HFA) 108 (90 Base) MCG/ACT inhaler     aspirin EC 81 MG EC tablet     atorvastatin (LIPITOR) 80 MG tablet     budesonide  "(PULMICORT) 0.5 MG/2ML neb solution     cetirizine (ZYRTEC) 10 MG tablet     Cholecalciferol (VITAMIN D PO)     fluticasone (FLONASE) 50 MCG/ACT nasal spray     Krill Oil 500 MG CAPS     levothyroxine (SYNTHROID/LEVOTHROID) 100 MCG tablet     lisinopril (ZESTRIL) 20 MG tablet     metroNIDAZOLE (METROGEL) 0.75 % external gel     Multiple Vitamin (MULTIVITAMIN) per tablet     other medical supplies     No current facility-administered medications for this visit.     ROS- SEE HPI  /62 (BP Location: Right arm, Patient Position: Sitting, Cuff Size: Adult Large)   Pulse 74   Temp 98  F (36.7  C) (Tympanic)   Ht 1.626 m (5' 4\")   Wt 106.6 kg (235 lb)   LMP  (LMP Unknown)   SpO2 98%   BMI 40.34 kg/m        General - The patient is well nourished and well developed, and appears to have good nutritional status.  Alert and oriented to person and place, answers questions and cooperates with examination appropriately.   Head and Face - Normocephalic and atraumatic, with no gross asymmetry noted.  The facial nerve is intact, with strong symmetric movements.  Voice and Breathing - The patient was breathing comfortably without the use of accessory muscles. There was no wheezing, stridor, or stertor.  The patients voice was clear and strong, and had appropriate pitch and quality.  Ears -The external auditory canals are patent, the tympanic membranes are intact without effusion, retraction or mass.  Bony landmarks are intact.  Eyes - Extraocular movements intact, and the pupils were reactive to light.  Sclera were not icteric or injected, conjunctiva were pink and moist.  Mouth - Examination of the oral cavity showed pink, healthy oral mucosa. No lesions or ulcerations noted.  The tongue was mobile and midline, and the dentition were in good condition.    Crowded posterior pharynx. FTP IV  Throat - The walls of the oropharynx were smooth, pink, moist, symmetric, and had no lesions or ulcerations.  The tonsillar pillars " and soft palate were symmetric.  The uvula was midline on elevation.    Neck - Normal midline excursion of the laryngotracheal complex during swallowing.  Full range of motion on passive movement.  Palpation of the occipital, submental, submandibular, internal jugular chain, and supraclavicular nodes did not demonstrate any abnormal lymph nodes or masses.  Palpation of the thyroid was soft and smooth, with no nodules or goiter appreciated.  The trachea was mobile and midline.     Nose- Right anterior septum with healing site of cautery. No active bleeding.   No packing noted on exam.         ASSESSMENT/ PLAN:      ICD-10-CM    1. H/O epistaxis  Z87.898      Recommended allowing site to continue to heal.     Continue with nasal saline and nasal ayr gel.   If increase in nasal congestion- use warm Sammy Med rinses.   Septum- healing site of cautery.   If any concerns - contact nurse.           Lorelei Guzman PA-C  ENT  [facility]

## 2022-02-08 ENCOUNTER — MEDICAL CORRESPONDENCE (OUTPATIENT)
Dept: HEALTH INFORMATION MANAGEMENT | Facility: OTHER | Age: 67
End: 2022-02-08
Payer: COMMERCIAL

## 2022-02-11 ENCOUNTER — TRANSFERRED RECORDS (OUTPATIENT)
Dept: HEALTH INFORMATION MANAGEMENT | Facility: OTHER | Age: 67
End: 2022-02-11
Payer: COMMERCIAL

## 2022-03-21 ENCOUNTER — TRANSFERRED RECORDS (OUTPATIENT)
Dept: HEALTH INFORMATION MANAGEMENT | Facility: OTHER | Age: 67
End: 2022-03-21
Payer: COMMERCIAL

## 2022-04-26 ENCOUNTER — OFFICE VISIT (OUTPATIENT)
Dept: INTERNAL MEDICINE | Facility: OTHER | Age: 67
End: 2022-04-26
Attending: INTERNAL MEDICINE
Payer: COMMERCIAL

## 2022-04-26 VITALS
HEART RATE: 76 BPM | SYSTOLIC BLOOD PRESSURE: 124 MMHG | TEMPERATURE: 98.1 F | RESPIRATION RATE: 18 BRPM | OXYGEN SATURATION: 96 % | BODY MASS INDEX: 41.3 KG/M2 | WEIGHT: 240.6 LBS | DIASTOLIC BLOOD PRESSURE: 78 MMHG

## 2022-04-26 DIAGNOSIS — R00.2 PALPITATIONS: Primary | ICD-10-CM

## 2022-04-26 LAB
ATRIAL RATE - MUSE: 69 BPM
DIASTOLIC BLOOD PRESSURE - MUSE: NORMAL MMHG
INTERPRETATION ECG - MUSE: NORMAL
P AXIS - MUSE: 44 DEGREES
PR INTERVAL - MUSE: 166 MS
QRS DURATION - MUSE: 100 MS
QT - MUSE: 420 MS
QTC - MUSE: 450 MS
R AXIS - MUSE: -14 DEGREES
SYSTOLIC BLOOD PRESSURE - MUSE: NORMAL MMHG
T AXIS - MUSE: 17 DEGREES
VENTRICULAR RATE- MUSE: 69 BPM

## 2022-04-26 PROCEDURE — G0463 HOSPITAL OUTPT CLINIC VISIT: HCPCS

## 2022-04-26 PROCEDURE — 93010 ELECTROCARDIOGRAM REPORT: CPT | Performed by: INTERNAL MEDICINE

## 2022-04-26 PROCEDURE — 93005 ELECTROCARDIOGRAM TRACING: CPT | Performed by: INTERNAL MEDICINE

## 2022-04-26 PROCEDURE — 99214 OFFICE O/P EST MOD 30 MIN: CPT | Performed by: INTERNAL MEDICINE

## 2022-04-26 ASSESSMENT — PAIN SCALES - GENERAL: PAINLEVEL: NO PAIN (0)

## 2022-04-26 ASSESSMENT — ENCOUNTER SYMPTOMS
HEMATOLOGIC/LYMPHATIC NEGATIVE: 1
ENDOCRINE NEGATIVE: 1
ALLERGIC/IMMUNOLOGIC NEGATIVE: 1
CONSTITUTIONAL NEGATIVE: 1

## 2022-04-26 NOTE — PROGRESS NOTES
Chief Complaint:  Racing heart.    HPI: She is in today with a complaint of racing heart.  She has had it in the past off and on but it seemed to be worse this weekend.  She was down in the [place] area helping take care of a sick granddaughter.  She admits that this time was stressful for her.  She will go to bed at night and she would feel her heart racing in her chest.  There was no associated pain or other symptoms with this.  There is a strong family history of heart disease so she is worried about having a heart attack or similar.  She has never had any heart issues.  She had a CT calcium score in January and her score was 0.  She has not had an EKG for quite some time, her last one did show a nonspecific interventricular conduction delay.  She admits that when she is stressed or when she has more caffeine than she should she seems to be more prone to having the palpitations.  No syncope or presyncope.  It has never lasted an extended time where she felt like she needed to go to the emergency room, etc.  She does have treated hypothyroidism but has not had a recent change in her thyroid dose.  No over-the-counter stimulants and she does not use a lot of caffeine generally.  No alcohol.    Past Medical History:   Diagnosis Date     Bacteremia      12/04,Negative coag testing     Calculus of kidney     age 19 and age 50     Chronic sinusitis     5/20/2011     Deviated nasal septum     8/3/2012     Deviated nasal septum     sever turbinate hypertophy and superior septal deviation to right with recurrent sinusitis     Gastro-esophageal reflux disease without esophagitis     8/31/2009     Personal history of other medical treatment (CODE)     rectocele with cervical prolapse     Phlebitis and thrombophlebitis     1/6/11, after varicose vein ablation     Phlebitis and thrombophlebitis of lower extremities, unspecified (CODE)     1/6/2011,Greater saphenous vein left lower extremity     Pneumonia     7/27/2012        Past Surgical History:   Procedure Laterality Date     COLONOSCOPY      10/13/08,which was normal.  Next colonoscopy due in 2018.     COLONOSCOPY N/A 10/26/2020    F/U 2025 tubular adenoma     HYSTERECTOMY VAGINAL      04/27/05, anterior repair with posterior perineorrhaphy and vaginal vault suspension     LAPAROSCOPIC TUBAL LIGATION      No Comments Provided     OTHER SURGICAL HISTORY      ,LITHOTRIPSY, with stent placement for right ureteral stone     OTHER SURGICAL HISTORY      8/11/05,881179,REMOVE,ureteral Stent removal     SEPTOPLASTY      8/2012,with turbinate reduction;  Dr. England     TONSILLECTOMY      No Comments Provided       Allergies   Allergen Reactions     Azithromycin Hives     Zithromax         Current Outpatient Medications   Medication Sig Dispense Refill     albuterol (PROAIR HFA/PROVENTIL HFA/VENTOLIN HFA) 108 (90 Base) MCG/ACT inhaler Inhale 2 puffs into the lungs every 4 hours as needed Inhale 1-2 puff 1 Inhaler 11     aspirin EC 81 MG EC tablet Take 81 mg by mouth daily with food       atorvastatin (LIPITOR) 80 MG tablet Take 1 tablet (80 mg) by mouth daily 90 tablet 3     budesonide (PULMICORT) 0.5 MG/2ML neb solution SPRAY 2MLS IN NOSTRIL TWICE DAILY MAKE 240CC MICHAEL MED SINUS IRRI. MIX 2ML VIAL OF BUDESONIDE 0.5MG RINSE 1-2 X DAILY AS NEEDED X 2-4 WEEKS 360 mL 1     cetirizine (ZYRTEC) 10 MG tablet Take 10 mg by mouth daily       Cholecalciferol (VITAMIN D PO) Take 1 tablet by mouth daily        fluticasone (FLONASE) 50 MCG/ACT nasal spray Spray 2 sprays into both nostrils daily 18.2 mL 11     Krill Oil 500 MG CAPS Take 1 capsule by mouth daily       levothyroxine (SYNTHROID/LEVOTHROID) 100 MCG tablet TAKE 1 TABLET(100 MCG) BY MOUTH EVERY MORNING BEFORE BREAKFAST 90 tablet 3     lisinopril (ZESTRIL) 20 MG tablet TAKE 1 TABLET(20 MG) BY MOUTH DAILY 90 tablet 3     metroNIDAZOLE (METROGEL) 0.75 % external gel Apply topically 2 times daily 45 g 3     Multiple Vitamin  (MULTIVITAMIN) per tablet Take 1 tablet by mouth daily. Food         Social History     Socioeconomic History     Marital status:      Spouse name: Not on file     Number of children: Not on file     Years of education: Not on file     Highest education level: Not on file   Occupational History     Not on file   Tobacco Use     Smoking status: Never Smoker     Smokeless tobacco: Never Used   Vaping Use     Vaping Use: Never used   Substance and Sexual Activity     Alcohol use: Yes     Alcohol/week: 0.0 standard drinks     Comment: Alcoholic Drinks/day: infrequent social     Drug use: Never     Comment: Drug use: No     Sexual activity: Yes     Partners: Male   Other Topics Concern     Parent/sibling w/ CABG, MI or angioplasty before 65F 55M? Not Asked   Social History Narrative    Retired from  [employer]; now helping with grandchildren    Patient has never smoked.     Passive smoke exposure - no    Alcohol Use - yes    Drug Use - no    HIV/High Risk - no    Regular Exercise - yes     - Justin     Social Determinants of Health     Financial Resource Strain: Not on file   Food Insecurity: Not on file   Transportation Needs: Not on file   Physical Activity: Not on file   Stress: Not on file   Social Connections: Not on file   Intimate Partner Violence: Not on file   Housing Stability: Not on file       Review of Systems   Constitutional: Negative.    Endocrine: Negative.    Skin: Negative.    Allergic/Immunologic: Negative.    Hematological: Negative.        Physical Exam  Vitals and nursing note reviewed.   Constitutional:       General: She is not in acute distress.     Appearance: Normal appearance. She is not ill-appearing, toxic-appearing or diaphoretic.   Neck:      Vascular: No carotid bruit.   Cardiovascular:      Rate and Rhythm: Normal rate and regular rhythm.      Heart sounds: Murmur heard.    Systolic murmur is present with a grade of 2/6.  Pulmonary:      Effort: Pulmonary effort is  normal.      Breath sounds: Normal breath sounds.   Lymphadenopathy:      Cervical: No cervical adenopathy.   Neurological:      Mental Status: She is alert.         Assessment:      ICD-10-CM    1. Palpitations  R00.2 EKG 12-lead, tracing only     Adult Leadless EKG Monitor 8 to 14 Days       Plan: Her exam today is basically normal, she has a trivial outflow murmur on cardiac exam.  EKG is essentially normal, questionable left atrial enlargement.  She is concerned about these palpitations.  I do not have any concerns about occult coronary artery disease.  After discussion, elected to get her scheduled for a Zio patch monitor for 2 weeks.  I will let her know the results and we will proceed as indicated.  Continue current medications without change.

## 2022-04-26 NOTE — PROGRESS NOTES
{PROVIDER CHARTING PREFERENCE:054016}    Romain Sanchez is a 67 year old who presents for the following health issues {ACCOMPANIED BY STATEMENT (Optional):172813}    History of Present Illness       Vascular Disease:  She presents for follow up of vascular disease.  She never takes nitroglycerin. She takes daily aspirin.    She eats 2-3 servings of fruits and vegetables daily.She consumes 0 sweetened beverage(s) daily.She exercises with enough effort to increase her heart rate 9 or less minutes per day.  She exercises with enough effort to increase her heart rate 3 or less days per week.   She is taking medications regularly.       {SUPERLIST (Optional):759012}  {additonal problems for provider to add (Optional):255787}    Review of Systems   {ROS COMP (Optional):353864}      Objective    /78   Pulse 76   Temp 98.1  F (36.7  C) (Tympanic)   Resp 18   Wt 109.1 kg (240 lb 9.6 oz)   LMP  (LMP Unknown)   SpO2 96%   Breastfeeding No   BMI 41.30 kg/m    Body mass index is 41.3 kg/m .  Physical Exam   {Exam List (Optional):290132}    {Diagnostic Test Results (Optional):822478}    {AMBULATORY ATTESTATION (Optional):793711}

## 2022-04-26 NOTE — NURSING NOTE
"Chief Complaint   Patient presents with     Tachycardia       Initial /78   Pulse 76   Temp 98.1  F (36.7  C) (Tympanic)   Resp 18   Wt 109.1 kg (240 lb 9.6 oz)   LMP  (LMP Unknown)   SpO2 96%   Breastfeeding No   BMI 41.30 kg/m   Estimated body mass index is 41.3 kg/m  as calculated from the following:    Height as of 2/3/22: 1.626 m (5' 4\").    Weight as of this encounter: 109.1 kg (240 lb 9.6 oz).  FOOD SECURITY SCREENING QUESTIONS  Hunger Vital Signs:  Within the past 12 months we worried whether our food would run out before we got money to buy more. Never  Within the past 12 months the food we bought just didn't last and we didn't have money to get more. Never        Sterling Ocampo, LAURENCE    "

## 2022-05-02 ENCOUNTER — HOSPITAL ENCOUNTER (OUTPATIENT)
Dept: CARDIOLOGY | Facility: OTHER | Age: 67
Discharge: HOME | End: 2022-05-02
Attending: INTERNAL MEDICINE | Admitting: INTERNAL MEDICINE
Payer: MEDICARE

## 2022-05-02 DIAGNOSIS — R00.2 PALPITATIONS: ICD-10-CM

## 2022-05-02 PROCEDURE — 93246 EXT ECG>7D<15D RECORDING: CPT

## 2022-05-02 PROCEDURE — 93248 EXT ECG>7D<15D REV&INTERPJ: CPT | Performed by: INTERNAL MEDICINE

## 2022-05-24 ENCOUNTER — HOSPITAL ENCOUNTER (OUTPATIENT)
Dept: MAMMOGRAPHY | Facility: OTHER | Age: 67
Discharge: HOME | End: 2022-05-24
Attending: FAMILY MEDICINE | Admitting: FAMILY MEDICINE
Payer: MEDICARE

## 2022-05-24 DIAGNOSIS — Z12.31 VISIT FOR SCREENING MAMMOGRAM: ICD-10-CM

## 2022-05-24 PROCEDURE — 77067 SCR MAMMO BI INCL CAD: CPT

## 2022-06-03 DIAGNOSIS — I10 ESSENTIAL HYPERTENSION: ICD-10-CM

## 2022-06-03 RX ORDER — LISINOPRIL 20 MG/1
TABLET ORAL
Qty: 90 TABLET | Refills: 3 | OUTPATIENT
Start: 2022-06-03

## 2022-06-03 NOTE — TELEPHONE ENCOUNTER
[facility][id number] sent Rx request for the following:      Requested Prescriptions   Pending Prescriptions Disp Refills     lisinopril (ZESTRIL) 20 MG tablet [Pharmacy Med Name: LISINOPRIL 20MG TABLETS] 90 tablet 3     Sig: TAKE 1 TABLET(20 MG) BY MOUTH DAILY       Last Prescription Date:   8/27/2021  Last Fill Qty/Refills:         90, R-3    Last Office Visit:              11/5/2021   Future Office visit:           NONE    Unable to complete prescription refill per RN Medication Refill Policy. Redundant refill request refused: Too soon:      Candy Vela, RN on 6/3/2022 at 9:30 AM

## 2022-07-11 ENCOUNTER — ALLIED HEALTH/NURSE VISIT (OUTPATIENT)
Dept: FAMILY MEDICINE | Facility: OTHER | Age: 67
End: 2022-07-11
Attending: PHYSICIAN ASSISTANT
Payer: MEDICARE

## 2022-07-11 DIAGNOSIS — Z20.822 EXPOSURE TO 2019 NOVEL CORONAVIRUS: Primary | ICD-10-CM

## 2022-07-11 PROCEDURE — C9803 HOPD COVID-19 SPEC COLLECT: HCPCS

## 2022-07-11 PROCEDURE — U0005 INFEC AGEN DETEC AMPLI PROBE: HCPCS | Mod: ZL

## 2022-07-12 LAB — SARS-COV-2 RNA RESP QL NAA+PROBE: NEGATIVE

## 2022-07-25 ENCOUNTER — MYC MEDICAL ADVICE (OUTPATIENT)
Dept: FAMILY MEDICINE | Facility: OTHER | Age: 67
End: 2022-07-25

## 2022-07-25 DIAGNOSIS — E78.00 HYPERCHOLESTEROLEMIA: Primary | ICD-10-CM

## 2022-07-25 DIAGNOSIS — Z13.29 SCREENING FOR THYROID DISORDER: ICD-10-CM

## 2022-07-29 ENCOUNTER — LAB (OUTPATIENT)
Dept: LAB | Facility: OTHER | Age: 67
End: 2022-07-29
Attending: FAMILY MEDICINE
Payer: MEDICARE

## 2022-07-29 DIAGNOSIS — E78.00 HYPERCHOLESTEROLEMIA: ICD-10-CM

## 2022-07-29 DIAGNOSIS — R79.89 ELEVATED LFTS: Primary | ICD-10-CM

## 2022-07-29 DIAGNOSIS — E03.9 HYPOTHYROIDISM, UNSPECIFIED TYPE: ICD-10-CM

## 2022-07-29 DIAGNOSIS — Z13.29 SCREENING FOR THYROID DISORDER: ICD-10-CM

## 2022-07-29 LAB
ALT SERPL W P-5'-P-CCNC: 93 U/L (ref 7–52)
AST SERPL W P-5'-P-CCNC: 76 U/L (ref 13–39)
CHOLEST SERPL-MCNC: 133 MG/DL
FASTING STATUS PATIENT QL REPORTED: YES
HDLC SERPL-MCNC: 49 MG/DL (ref 23–92)
LDLC SERPL CALC-MCNC: 70 MG/DL
NONHDLC SERPL-MCNC: 84 MG/DL
T4 FREE SERPL-MCNC: 0.78 NG/DL (ref 0.6–1.6)
TRIGL SERPL-MCNC: 72 MG/DL
TSH SERPL DL<=0.005 MIU/L-ACNC: 4.69 MU/L (ref 0.4–4)

## 2022-07-29 PROCEDURE — 84450 TRANSFERASE (AST) (SGOT): CPT | Mod: ZL

## 2022-07-29 PROCEDURE — 84439 ASSAY OF FREE THYROXINE: CPT | Mod: ZL

## 2022-07-29 PROCEDURE — 84460 ALANINE AMINO (ALT) (SGPT): CPT | Mod: ZL

## 2022-07-29 PROCEDURE — 84443 ASSAY THYROID STIM HORMONE: CPT | Mod: ZL

## 2022-07-29 PROCEDURE — 36415 COLL VENOUS BLD VENIPUNCTURE: CPT | Mod: ZL

## 2022-07-29 PROCEDURE — 80061 LIPID PANEL: CPT | Mod: ZL

## 2022-07-29 RX ORDER — LEVOTHYROXINE SODIUM 112 UG/1
112 TABLET ORAL DAILY
Qty: 90 TABLET | Refills: 3 | Status: SHIPPED | OUTPATIENT
Start: 2022-07-29 | End: 2022-12-05

## 2022-08-03 ENCOUNTER — TRANSFERRED RECORDS (OUTPATIENT)
Dept: HEALTH INFORMATION MANAGEMENT | Facility: OTHER | Age: 67
End: 2022-08-03

## 2022-08-26 ENCOUNTER — LAB (OUTPATIENT)
Dept: LAB | Facility: OTHER | Age: 67
End: 2022-08-26
Attending: FAMILY MEDICINE
Payer: MEDICARE

## 2022-08-26 DIAGNOSIS — R79.89 ELEVATED LFTS: ICD-10-CM

## 2022-08-26 DIAGNOSIS — E03.9 HYPOTHYROIDISM, UNSPECIFIED TYPE: ICD-10-CM

## 2022-08-26 LAB
ALT SERPL W P-5'-P-CCNC: 26 U/L (ref 7–52)
AST SERPL W P-5'-P-CCNC: 20 U/L (ref 13–39)
TSH SERPL DL<=0.005 MIU/L-ACNC: 0.56 MU/L (ref 0.4–4)

## 2022-08-26 PROCEDURE — 84460 ALANINE AMINO (ALT) (SGPT): CPT | Mod: ZL

## 2022-08-26 PROCEDURE — 84450 TRANSFERASE (AST) (SGOT): CPT | Mod: ZL

## 2022-08-26 PROCEDURE — 36415 COLL VENOUS BLD VENIPUNCTURE: CPT | Mod: ZL

## 2022-08-26 PROCEDURE — 84443 ASSAY THYROID STIM HORMONE: CPT | Mod: ZL

## 2022-08-29 ENCOUNTER — MYC MEDICAL ADVICE (OUTPATIENT)
Dept: FAMILY MEDICINE | Facility: OTHER | Age: 67
End: 2022-08-29

## 2022-08-29 DIAGNOSIS — E78.00 HYPERCHOLESTEROLEMIA: Primary | ICD-10-CM

## 2022-08-29 RX ORDER — ROSUVASTATIN CALCIUM 5 MG/1
5 TABLET, COATED ORAL DAILY
Qty: 90 TABLET | Refills: 3 | Status: SHIPPED | OUTPATIENT
Start: 2022-08-29 | End: 2022-12-05

## 2022-09-04 ENCOUNTER — HEALTH MAINTENANCE LETTER (OUTPATIENT)
Age: 67
End: 2022-09-04

## 2022-09-07 ENCOUNTER — TRANSFERRED RECORDS (OUTPATIENT)
Dept: HEALTH INFORMATION MANAGEMENT | Facility: OTHER | Age: 67
End: 2022-09-07

## 2022-09-07 DIAGNOSIS — I10 ESSENTIAL HYPERTENSION: ICD-10-CM

## 2022-09-07 RX ORDER — LISINOPRIL 20 MG/1
TABLET ORAL
Qty: 90 TABLET | Refills: 3 | Status: CANCELLED | OUTPATIENT
Start: 2022-09-07

## 2022-09-08 RX ORDER — LISINOPRIL 20 MG/1
TABLET ORAL
Qty: 90 TABLET | Refills: 0 | Status: SHIPPED | OUTPATIENT
Start: 2022-09-08 | End: 2022-12-05

## 2022-09-08 NOTE — TELEPHONE ENCOUNTER
Routing refill request to provider for review/approval because:    LOV: 11/5/21  Anny Das RN on 9/8/2022 at 3:57 PM

## 2022-09-12 DIAGNOSIS — M25.561 RIGHT KNEE PAIN, UNSPECIFIED CHRONICITY: Primary | ICD-10-CM

## 2022-09-13 ENCOUNTER — HOSPITAL ENCOUNTER (OUTPATIENT)
Dept: GENERAL RADIOLOGY | Facility: OTHER | Age: 67
Discharge: HOME | End: 2022-09-13
Attending: ORTHOPAEDIC SURGERY
Payer: MEDICARE

## 2022-09-13 ENCOUNTER — OFFICE VISIT (OUTPATIENT)
Dept: ORTHOPEDICS | Facility: OTHER | Age: 67
End: 2022-09-13
Attending: ORTHOPAEDIC SURGERY
Payer: MEDICARE

## 2022-09-13 VITALS — SYSTOLIC BLOOD PRESSURE: 120 MMHG | OXYGEN SATURATION: 99 % | HEART RATE: 76 BPM | DIASTOLIC BLOOD PRESSURE: 74 MMHG

## 2022-09-13 DIAGNOSIS — G89.29 CHRONIC PAIN OF RIGHT KNEE: Primary | ICD-10-CM

## 2022-09-13 DIAGNOSIS — M25.561 CHRONIC PAIN OF RIGHT KNEE: Primary | ICD-10-CM

## 2022-09-13 DIAGNOSIS — M25.561 RIGHT KNEE PAIN, UNSPECIFIED CHRONICITY: ICD-10-CM

## 2022-09-13 PROCEDURE — G0463 HOSPITAL OUTPT CLINIC VISIT: HCPCS

## 2022-09-13 PROCEDURE — 99203 OFFICE O/P NEW LOW 30 MIN: CPT | Performed by: ORTHOPAEDIC SURGERY

## 2022-09-13 PROCEDURE — 73560 X-RAY EXAM OF KNEE 1 OR 2: CPT | Mod: LT

## 2022-09-13 ASSESSMENT — PAIN SCALES - GENERAL: PAINLEVEL: NO PAIN (0)

## 2022-09-13 NOTE — PROGRESS NOTES
Visit Date: 09/13/2022    REASON FOR EVALUATION:  Right knee.    HISTORY OF PRESENT ILLNESS:  Laura comes in with regard to her right knee.  It has been bothering her for at least the last few months.  Pain is on the lateral side, but not overly painful.  She is reporting more discomfort with just strength-related issues at this point in time.  She is here to consider next steps and options here as well.  She has had a prior injection about 10 years back with Dr. Aguilera that seemed to work out for her inflammatory change.  She has not been doing injections.  Recently has been losing some weight and that does seem like it has helped out.  She is actually feeling better over the last month.  We did update her x-rays here today.  They do show an advanced level of arthrosis on the lateral compartment, as well as the patellofemoral joint at this time.    MEDICATIONS:  Have been reviewed.    ALLERGIES:  NOTED TO AZITHROMYCIN.    REVIEW OF SYSTEMS:  A 12-point otherwise negative with the exception as stated above.    PHYSICAL EXAMINATION:  The patient is 240 pounds.  She is alert and oriented x 3, cooperative with exam, in no acute distress.  Does ambulate with satisfactory gait.  Affect is appropriate here as well.  Examination of right knee shows valgus deformity.  Range of motion 0-115.  Ligament exam is stable and balanced.  Tenderness across the lateral side here.  Crepitation with flexion and extension is noted here as well in addition to that.  She does not have any pain with He testing.  Does have some quad weakness here.  No pain with straight leg raise.    IMAGING:  X-rays reviewed show advanced arthritic change, lateral compartment, as well as patellofemoral joint.    IMPRESSION:  Right knee arthrosis with quad weakness.    PLAN:  Given that she is not having any significant pain, recommendation is that we do not do an injection at this time.  I do certainly think long-term management is going to  consist of joint reconstruction.  She is not ready for that.  We are going to put a Therapy referral in to work on quad strengthening, which certainly should help with up and down steps and getting better in that capacity as well.  The patient is in agreement with that plan, and she will keep me in the loop in terms of next steps for her, whether that is injection or therapy,  An order has been placed at this point in time.    Solomon Donovan MD        D: 2022   T: 2022   MT: RAPHAEL    Name:     ARCENIO PRINGLE [place]  MRN:      [id number]        Account:    [id number]   :      1955           Visit Date: 2022     Document: [id number]

## 2022-09-13 NOTE — PROGRESS NOTES
Patient is here for consult on her right knee pain.  Kaylyn Reilly LPN .....................9/13/2022 9:54 AM

## 2022-09-16 DIAGNOSIS — E03.9 HYPOTHYROIDISM, UNSPECIFIED TYPE: ICD-10-CM

## 2022-09-16 RX ORDER — LEVOTHYROXINE SODIUM 100 UG/1
TABLET ORAL
Qty: 90 TABLET | Refills: 3 | OUTPATIENT
Start: 2022-09-16

## 2022-09-16 NOTE — TELEPHONE ENCOUNTER
"Refused, discontinued on 7/29/22, dosage changed to 112 mcg  Grace Godinez RN on 9/16/2022 at 3:51 PM       Requested Prescriptions   Pending Prescriptions Disp Refills     levothyroxine (SYNTHROID/LEVOTHROID) 100 MCG tablet [Pharmacy Med Name: LEVOTHYROXINE 0.100MG (100MCG) TAB] 90 tablet 3     Sig: TAKE 1 TABLET(100 MCG) BY MOUTH EVERY MORNING BEFORE BREAKFAST       Thyroid Protocol Passed - 9/16/2022  4:04 AM        Passed - Patient is 12 years or older        Passed - Recent (12 mo) or future (30 days) visit within the authorizing provider's specialty     Patient has had an office visit with the authorizing provider or a provider within the authorizing providers department within the previous 12 mos or has a future within next 30 days. See \"Patient Info\" tab in inbasket, or \"Choose Columns\" in Meds & Orders section of the refill encounter.              Passed - Medication is active on med list        Passed - Normal TSH on file in past 12 months     Recent Labs   Lab Test 08/26/22  0807   TSH 0.56              Passed - No active pregnancy on record     If patient is pregnant or has had a positive pregnancy test, please check TSH.          Passed - No positive pregnancy test in past 12 months     If patient is pregnant or has had a positive pregnancy test, please check TSH.               "

## 2022-10-18 ENCOUNTER — LAB (OUTPATIENT)
Dept: LAB | Facility: OTHER | Age: 67
End: 2022-10-18
Attending: FAMILY MEDICINE
Payer: MEDICARE

## 2022-10-18 DIAGNOSIS — E78.00 HYPERCHOLESTEROLEMIA: ICD-10-CM

## 2022-10-18 LAB
ALT SERPL W P-5'-P-CCNC: 19 U/L (ref 7–52)
AST SERPL W P-5'-P-CCNC: 18 U/L (ref 13–39)
CHOLEST SERPL-MCNC: 158 MG/DL
FASTING STATUS PATIENT QL REPORTED: YES
HDLC SERPL-MCNC: 47 MG/DL (ref 23–92)
LDLC SERPL CALC-MCNC: 91 MG/DL
NONHDLC SERPL-MCNC: 111 MG/DL
TRIGL SERPL-MCNC: 99 MG/DL

## 2022-10-18 PROCEDURE — 84450 TRANSFERASE (AST) (SGOT): CPT | Mod: ZL

## 2022-10-18 PROCEDURE — 36415 COLL VENOUS BLD VENIPUNCTURE: CPT | Mod: ZL

## 2022-10-18 PROCEDURE — 84460 ALANINE AMINO (ALT) (SGPT): CPT | Mod: ZL

## 2022-10-18 PROCEDURE — 80061 LIPID PANEL: CPT | Mod: ZL

## 2022-10-27 ENCOUNTER — HOSPITAL ENCOUNTER (OUTPATIENT)
Dept: PHYSICAL THERAPY | Facility: OTHER | Age: 67
Setting detail: THERAPIES SERIES
Discharge: HOME | End: 2022-10-27
Attending: ORTHOPAEDIC SURGERY
Payer: MEDICARE

## 2022-10-27 DIAGNOSIS — G89.29 CHRONIC PAIN OF RIGHT KNEE: ICD-10-CM

## 2022-10-27 DIAGNOSIS — M25.561 CHRONIC PAIN OF RIGHT KNEE: ICD-10-CM

## 2022-10-27 PROCEDURE — 97110 THERAPEUTIC EXERCISES: CPT | Mod: GP

## 2022-10-27 PROCEDURE — 97161 PT EVAL LOW COMPLEX 20 MIN: CPT | Mod: GP

## 2022-10-27 NOTE — PROGRESS NOTES
[facility]    OUTPATIENT PHYSICAL THERAPY ORTHOPEDIC EVALUATION  PLAN OF TREATMENT FOR OUTPATIENT REHABILITATION  (COMPLETE FOR INITIAL CLAIMS ONLY)  Patient's Last Name, First Name, M.I.  YOB: 1955  Laura Romero  BEBETO    Provider s Name:  [facility]   Medical Record No.  [id number]   Start of Care Date:  10/27/22   Onset Date:  09/13/22   Type:     _X__PT   ___OT   ___SLP Medical Diagnosis:  chronic pain of the R knee (M25.561, G89.29).     PT Diagnosis:  Impaired mobility due to chronic R knee pain, OA.   Visits from SOC:  1      _________________________________________________________________________________  Plan of Treatment/Functional Goals:  balance training, joint mobilization, manual therapy, ROM, strengthening, stretching  Primary focus on home program, LE strengthening  Cryotherapy, Electrical stimulation  if indicated  Goals  Goal Identifier: HEP  Goal Description: Pt to display independence and compliance with HEP 5 of 7 days per week to assist her in building LE strength to assist in prolonging time before TKA, improve functional mobility  Target Date: 01/22/23    Goal Identifier: Stairs  Goal Description: Pt to display improved LE strength to at least 4+/5 and improved balance to allow her to descend stairs with a railing facing anterior with 50% reported decrease in fear of falling.  Target Date: 01/22/23    Goal Identifier: Gait  Goal Description: Pt to report the ability to walk down  the hill in her yard with improved R knee control, minimal pain.  Target Date: 01/22/23                                                           Therapy Frequency:  other (see comments) (2-8 visits)  Predicted Duration of Therapy Intervention:  3 months    Kim Medina PT                 I CERTIFY THE NEED FOR THESE SERVICES FURNISHED UNDER        THIS PLAN OF  TREATMENT AND WHILE UNDER MY CARE     (Physician co-signature of this document indicates review and certification of the therapy plan).                     Certification Date From:  10/27/22   Certification Date To:  01/22/23    Referring Provider:  Solomon Donovan MD    Initial Assessment        See Epic Evaluation Start of Care Date: 10/27/22

## 2022-10-27 NOTE — PROGRESS NOTES
10/27/22 1100   General Information   Type of Visit Initial OP Ortho PT Evaluation   Start of Care Date 10/27/22   Referring Physician Solomon Donovan MD   Patient/Family Goals Statement To improve my strength   Orders Evaluate and Treat   Orders Comment 2-4 visits for quad strength, HEP   Date of Order 09/13/22   Certification Required? Yes   Medical Diagnosis chronic pain of the R knee (M25.561, G89.29).   Surgical/Medical history reviewed Yes   Body Part(s)   Body Part(s) Knee   Presentation and Etiology   Pertinent history of current problem (include personal factors and/or comorbidities that impact the POC) Pt is referred to PT due to chronic pain of the R knee (M25.561, G89.29). She is known to this therapist from past episodes of care. She was seen by Dr. Donovan on 9-13-22 at which time he updated her xrays. It is noted that she has advanced arthritic changes in the lateral compartment and the patellofemoral joint.  She is referred for 2-4 visits for quad strengthening and to establish a home program. Notes that earlier this year, she was having right foot pain and swelling with no precipitating injury. Notes she had seen 2 podiatrists. Has ankle arthritis, bunion, a bone spur and hammer toes. Had talked about surgical intervention but got better with injection, PT.  Notes that her knee flared up several months ago but is now better. Gets occasional knee pain in the lateral knee if she does a lot of walking, hiking, stairs. Notes that it is weak. Notes she is getting better on stairs than she had been--has been going down sideways. Now she is doing better facing forward but is afraid she is going to fall. Notes she is losing weight which seems to be helping her knee. Notes that Dr. Donovan did not feel injection or surgery were indicated now but told her she will have to have her knee replaced in the future. Aggravating factors: stairs, a lot of walking and prolonged standing, uneven ground is terrible.  Notes going down the hill in her yard is tough. Sometimes riding in the car flares her pain. Easing factors: rest, elevate, ice. Ibuprofen. She has been going to exercise/pool classes at [facility].  Notes that it has been over 10 years since she had an injection in her knee.  Past medical Hx/comorbidities: HTN, arthritis, vaginal hysterectomy, R lithotripsy.   Impairments F. Decreased strength and endurance;D. Decreased ROM   Functional Limitations perform activities of daily living;perform required work activities;perform desired leisure / sports activities   Symptom Location R LE weakness; lateral knee pain intermittently, P-F pain intermittently   How/Where did it occur From Degenerative Joint Disease   Onset date of current episode/exacerbation 09/13/22   Chronicity Chronic   Pain rating (0-10 point scale) Denies pain   Pain quality   (reports minimal pain)   Frequency of pain/symptoms B. Intermittent   Pain/symptoms exacerbated by B. Walking;M. Other  (stairs, uneven ground, riding in the car. Difficult to get up from the floor. Unable to squat.)   Pain/symptoms eased by C. Rest;I. OTC medication(s)   Progression of symptoms since onset: Improved   Current / Previous Interventions   Diagnostic Tests: X-ray   X-ray Results Results   X-ray results Moderate lateral and patellofemoral degenerative change of the right  knee.   Prior Level of Function   Functional Level Prior Comment Issues with R foot and knee over the last year with prior minimal issues limiting function.   Current Level of Function   Patient role/employment history F. Retired   Living environment [facility]   Fall Risk Screen   Fall screen completed by PT   Have you fallen 2 or more times in the past year? No   Is patient a fall risk? No   Fall screen comments Pt does note increased fear of falling-- will incorporate balance into HEP.   Abuse Screen (yes response referral indicated)   Feels Unsafe at Home or Work/School no    Feels Threatened by Someone no   Does Anyone Try to Keep You From Having Contact with Others or Doing Things Outside Your Home? no   Physical Signs of Abuse Present no   Knee Objective Findings   Gait/Locomotion Good knee ROM, foot collapses into pronation. Mild decrease in pelvic rotation, pushoff   Knee ROM Comment L knee 0-122 AROM. P-F crepitus noted with ROM.   Knee/Hip Strength Comments R hip flexor 4+/5, Glut med 4-/5, glut max 4-/5. L LE strength hip flexion 4+/5, hip extension 4/5, hip abd/glut med 4/5, knee extension 5-/5, knee flexion 4+/5.   Palpation Minimally tender along the lateral joint line. Patellar/knee crepitus noted with palpation with active movement. Crepitus also noted at the R ankle with AROM.   Accessory Motion/Joint Mobility Patella lateral, laterally faced, R>L with decreased medial glide. Further evaluation deferred but will assess balance, closed chain function at later session.   Observation Pt is able to squat through only approx 30% of normal range.   Posture Significant right foot pronation with R bunion. Hammer toes noted. Similar posturing on left foot. Increased R genu valgus noted. Stands with decreased weight on R LE.   Side (if bilateral, select both right and left) Right   Right Knee Extension AROM 0   Right Knee Flexion AROM 110   Right Knee Flexion Strength 4/5   Right Knee Extension Strength 4/5   Right Hip Abduction Strength 4/5   Right Gastrocnemius Flexibility decreased to +5 degrees. Limited plantar flexion to 38 degrees.   Right Hamstring Flexibility WNL   Right Hip Flexor Flexibility decreased   Right Quadricep Flexibility decreased, painful   Planned Therapy Interventions   Planned Therapy Interventions balance training;joint mobilization;manual therapy;ROM;strengthening;stretching   Planned Therapy Interventions Comment Primary focus on home program, LE strengthening   Planned Modality Interventions   Planned Modality Interventions Cryotherapy;Electrical  stimulation   Planned Modality Interventions Comments if indicated   Clinical Impression   Criteria for Skilled Therapeutic Interventions Met yes, treatment indicated   PT Diagnosis Impaired mobility due to chronic R knee pain, OA.   Influenced by the following impairments Postural dysfunction, weakness, decreased balance, limited ROM (mild), gait deviation   Functional limitations due to impairments walking long distances, on uneven ground, hills, stair (descending), prolonged sitting, riding in car. Unable to squat. Difficulty getting up from floor.   Clinical Presentation Stable/Uncomplicated   Clinical Decision Making (Complexity) Low complexity   Therapy Frequency other (see comments)  (2-8 visits)   Predicted Duration of Therapy Intervention (days/wks) 3 months   Risk & Benefits of therapy have been explained Yes   Patient, Family & other staff in agreement with plan of care Yes   Clinical Impression Comments Pt is referred to PT due to chronic R knee pain with significant degenerative changes at the knee and ankle. She has minimal pain but notes weakness and decreased mobility. She presents with generalized LE weakness R>L, knee and foot postural changes with genu valgus and excessive foot pronation. PT indicated to address weakness, limited ROM, balance concerns and to develop a home strengthening program.   Education Assessment   Preferred Learning Style Listening   Barriers to Learning No barriers   ORTHO GOALS   PT Ortho Eval Goals 1;2;3   Ortho Goal 1   Goal Identifier HEP   Goal Description Pt to display independence and compliance with HEP 5 of 7 days per week to assist her in building LE strength to assist in prolonging time before TKA, improve functional mobility   Target Date 01/22/23   Ortho Goal 2   Goal Identifier Stairs   Goal Description Pt to display improved LE strength to at least 4+/5 and improved balance to allow her to descend stairs with a railing facing anterior with 50% reported  decrease in fear of falling.   Target Date 01/22/23   Ortho Goal 3   Goal Identifier Gait   Goal Description Pt to report the ability to walk down  the hill in her yard with improved R knee control, minimal pain.   Target Date 01/22/23   Total Evaluation Time   PT Eval, Low Complexity Minutes (58557) 30   Therapy Certification   Certification date from 10/27/22   Certification date to 01/22/23   Medical Diagnosis chronic pain of the R knee (M25.561, G89.29).   Kim Medina, PT on 10/27/2022 at 2:34 PM

## 2022-11-01 ENCOUNTER — HOSPITAL ENCOUNTER (OUTPATIENT)
Dept: PHYSICAL THERAPY | Facility: OTHER | Age: 67
Setting detail: THERAPIES SERIES
Discharge: HOME | End: 2022-11-01
Attending: ORTHOPAEDIC SURGERY
Payer: MEDICARE

## 2022-11-01 PROCEDURE — 97110 THERAPEUTIC EXERCISES: CPT | Mod: GP

## 2022-11-10 ENCOUNTER — HOSPITAL ENCOUNTER (OUTPATIENT)
Dept: PHYSICAL THERAPY | Facility: OTHER | Age: 67
Setting detail: THERAPIES SERIES
Discharge: HOME | End: 2022-11-10
Attending: ORTHOPAEDIC SURGERY
Payer: MEDICARE

## 2022-11-10 PROCEDURE — 97110 THERAPEUTIC EXERCISES: CPT | Mod: GP

## 2022-11-17 ENCOUNTER — HOSPITAL ENCOUNTER (OUTPATIENT)
Dept: PHYSICAL THERAPY | Facility: OTHER | Age: 67
Setting detail: THERAPIES SERIES
Discharge: HOME | End: 2022-11-17
Attending: ORTHOPAEDIC SURGERY
Payer: MEDICARE

## 2022-11-17 PROCEDURE — 97110 THERAPEUTIC EXERCISES: CPT | Mod: GP

## 2022-11-22 ENCOUNTER — HOSPITAL ENCOUNTER (OUTPATIENT)
Dept: PHYSICAL THERAPY | Facility: OTHER | Age: 67
Setting detail: THERAPIES SERIES
Discharge: HOME | End: 2022-11-22
Attending: ORTHOPAEDIC SURGERY
Payer: MEDICARE

## 2022-11-22 PROCEDURE — 97110 THERAPEUTIC EXERCISES: CPT | Mod: GP

## 2022-11-28 ASSESSMENT — ACTIVITIES OF DAILY LIVING (ADL): CURRENT_FUNCTION: NO ASSISTANCE NEEDED

## 2022-11-28 ASSESSMENT — ENCOUNTER SYMPTOMS
HEMATOCHEZIA: 0
CHILLS: 0
NERVOUS/ANXIOUS: 0
ABDOMINAL PAIN: 0
JOINT SWELLING: 0
FEVER: 0
DIARRHEA: 0
NAUSEA: 0
DIZZINESS: 0
PALPITATIONS: 0
PARESTHESIAS: 0
ARTHRALGIAS: 1
SHORTNESS OF BREATH: 0
COUGH: 0
HEMATURIA: 0
DYSURIA: 0
MYALGIAS: 0
CONSTIPATION: 1
HEARTBURN: 0
BREAST MASS: 0
HEADACHES: 0
EYE PAIN: 0
WEAKNESS: 0
FREQUENCY: 0
SORE THROAT: 0

## 2022-12-04 ASSESSMENT — ASTHMA QUESTIONNAIRES
ACT_TOTALSCORE: 25
QUESTION_4 LAST FOUR WEEKS HOW OFTEN HAVE YOU USED YOUR RESCUE INHALER OR NEBULIZER MEDICATION (SUCH AS ALBUTEROL): NOT AT ALL
QUESTION_5 LAST FOUR WEEKS HOW WOULD YOU RATE YOUR ASTHMA CONTROL: COMPLETELY CONTROLLED
QUESTION_2 LAST FOUR WEEKS HOW OFTEN HAVE YOU HAD SHORTNESS OF BREATH: NOT AT ALL
QUESTION_3 LAST FOUR WEEKS HOW OFTEN DID YOUR ASTHMA SYMPTOMS (WHEEZING, COUGHING, SHORTNESS OF BREATH, CHEST TIGHTNESS OR PAIN) WAKE YOU UP AT NIGHT OR EARLIER THAN USUAL IN THE MORNING: NOT AT ALL
ACT_TOTALSCORE: 25
QUESTION_1 LAST FOUR WEEKS HOW MUCH OF THE TIME DID YOUR ASTHMA KEEP YOU FROM GETTING AS MUCH DONE AT WORK, SCHOOL OR AT HOME: NONE OF THE TIME

## 2022-12-05 ENCOUNTER — OFFICE VISIT (OUTPATIENT)
Dept: FAMILY MEDICINE | Facility: OTHER | Age: 67
End: 2022-12-05
Attending: FAMILY MEDICINE
Payer: COMMERCIAL

## 2022-12-05 VITALS
HEIGHT: 64 IN | OXYGEN SATURATION: 98 % | TEMPERATURE: 97.5 F | DIASTOLIC BLOOD PRESSURE: 80 MMHG | WEIGHT: 185.6 LBS | HEART RATE: 72 BPM | SYSTOLIC BLOOD PRESSURE: 122 MMHG | RESPIRATION RATE: 16 BRPM | BODY MASS INDEX: 31.69 KG/M2

## 2022-12-05 DIAGNOSIS — E78.00 HYPERCHOLESTEROLEMIA: ICD-10-CM

## 2022-12-05 DIAGNOSIS — Z00.00 ENCOUNTER FOR MEDICARE ANNUAL WELLNESS EXAM: Primary | ICD-10-CM

## 2022-12-05 DIAGNOSIS — E03.9 HYPOTHYROIDISM, UNSPECIFIED TYPE: ICD-10-CM

## 2022-12-05 DIAGNOSIS — J30.2 SEASONAL ALLERGIC RHINITIS, UNSPECIFIED TRIGGER: ICD-10-CM

## 2022-12-05 DIAGNOSIS — L71.9 ROSACEA: ICD-10-CM

## 2022-12-05 DIAGNOSIS — J30.89 PERENNIAL ALLERGIC RHINITIS: ICD-10-CM

## 2022-12-05 DIAGNOSIS — I10 ESSENTIAL HYPERTENSION: ICD-10-CM

## 2022-12-05 DIAGNOSIS — Z13.0 SCREENING FOR DEFICIENCY ANEMIA: ICD-10-CM

## 2022-12-05 PROBLEM — E66.01 MORBID OBESITY (H): Status: RESOLVED | Noted: 2021-11-05 | Resolved: 2022-12-05

## 2022-12-05 LAB
ALBUMIN SERPL BCG-MCNC: 4.2 G/DL (ref 3.5–5.2)
ALP SERPL-CCNC: 80 U/L (ref 35–104)
ALT SERPL W P-5'-P-CCNC: 22 U/L (ref 10–35)
ANION GAP SERPL CALCULATED.3IONS-SCNC: 10 MMOL/L (ref 7–15)
AST SERPL W P-5'-P-CCNC: 25 U/L (ref 10–35)
BASOPHILS # BLD AUTO: 0.1 10E3/UL (ref 0–0.2)
BASOPHILS NFR BLD AUTO: 1 %
BILIRUB SERPL-MCNC: 0.6 MG/DL
BUN SERPL-MCNC: 19.9 MG/DL (ref 8–23)
CALCIUM SERPL-MCNC: 9.2 MG/DL (ref 8.8–10.2)
CHLORIDE SERPL-SCNC: 104 MMOL/L (ref 98–107)
CHOLEST SERPL-MCNC: 156 MG/DL
CREAT SERPL-MCNC: 0.65 MG/DL (ref 0.51–0.95)
DEPRECATED HCO3 PLAS-SCNC: 28 MMOL/L (ref 22–29)
EOSINOPHIL # BLD AUTO: 0.1 10E3/UL (ref 0–0.7)
EOSINOPHIL NFR BLD AUTO: 2 %
ERYTHROCYTE [DISTWIDTH] IN BLOOD BY AUTOMATED COUNT: 14.9 % (ref 10–15)
GFR SERPL CREATININE-BSD FRML MDRD: >90 ML/MIN/1.73M2
GLUCOSE SERPL-MCNC: 84 MG/DL (ref 70–99)
HCT VFR BLD AUTO: 44.6 % (ref 35–47)
HDLC SERPL-MCNC: 63 MG/DL
HGB BLD-MCNC: 14.5 G/DL (ref 11.7–15.7)
HOLD SPECIMEN: NORMAL
IMM GRANULOCYTES # BLD: 0 10E3/UL
IMM GRANULOCYTES NFR BLD: 0 %
LDLC SERPL CALC-MCNC: 83 MG/DL
LYMPHOCYTES # BLD AUTO: 2.1 10E3/UL (ref 0.8–5.3)
LYMPHOCYTES NFR BLD AUTO: 36 %
MCH RBC QN AUTO: 28.8 PG (ref 26.5–33)
MCHC RBC AUTO-ENTMCNC: 32.5 G/DL (ref 31.5–36.5)
MCV RBC AUTO: 89 FL (ref 78–100)
MONOCYTES # BLD AUTO: 0.4 10E3/UL (ref 0–1.3)
MONOCYTES NFR BLD AUTO: 7 %
NEUTROPHILS # BLD AUTO: 3.2 10E3/UL (ref 1.6–8.3)
NEUTROPHILS NFR BLD AUTO: 54 %
NONHDLC SERPL-MCNC: 93 MG/DL
NRBC # BLD AUTO: 0 10E3/UL
NRBC BLD AUTO-RTO: 0 /100
PLATELET # BLD AUTO: 245 10E3/UL (ref 150–450)
POTASSIUM SERPL-SCNC: 4.3 MMOL/L (ref 3.4–5.3)
PROT SERPL-MCNC: 6.9 G/DL (ref 6.4–8.3)
RBC # BLD AUTO: 5.04 10E6/UL (ref 3.8–5.2)
SODIUM SERPL-SCNC: 142 MMOL/L (ref 136–145)
TRIGL SERPL-MCNC: 49 MG/DL
TSH SERPL DL<=0.005 MIU/L-ACNC: 0.3 UIU/ML (ref 0.3–4.2)
WBC # BLD AUTO: 5.9 10E3/UL (ref 4–11)

## 2022-12-05 PROCEDURE — 80053 COMPREHEN METABOLIC PANEL: CPT | Mod: ZL | Performed by: FAMILY MEDICINE

## 2022-12-05 PROCEDURE — 85025 COMPLETE CBC W/AUTO DIFF WBC: CPT | Mod: ZL | Performed by: FAMILY MEDICINE

## 2022-12-05 PROCEDURE — 80061 LIPID PANEL: CPT | Mod: ZL | Performed by: FAMILY MEDICINE

## 2022-12-05 PROCEDURE — G0439 PPPS, SUBSEQ VISIT: HCPCS | Performed by: FAMILY MEDICINE

## 2022-12-05 PROCEDURE — 36415 COLL VENOUS BLD VENIPUNCTURE: CPT | Mod: ZL | Performed by: FAMILY MEDICINE

## 2022-12-05 PROCEDURE — G0463 HOSPITAL OUTPT CLINIC VISIT: HCPCS

## 2022-12-05 PROCEDURE — 84443 ASSAY THYROID STIM HORMONE: CPT | Mod: ZL | Performed by: FAMILY MEDICINE

## 2022-12-05 RX ORDER — ROSUVASTATIN CALCIUM 5 MG/1
5 TABLET, COATED ORAL DAILY
Qty: 90 TABLET | Refills: 3 | Status: SHIPPED | OUTPATIENT
Start: 2022-12-05 | End: 2023-08-17

## 2022-12-05 RX ORDER — AMOXICILLIN AND CLAVULANATE POTASSIUM 500; 125 MG/1; MG/1
TABLET, FILM COATED ORAL
COMMUNITY
Start: 2022-11-30 | End: 2023-01-03

## 2022-12-05 RX ORDER — BUDESONIDE 0.5 MG/2ML
INHALANT ORAL
Qty: 360 ML | Refills: 1 | Status: SHIPPED | OUTPATIENT
Start: 2022-12-05 | End: 2023-05-16

## 2022-12-05 RX ORDER — ALBUTEROL SULFATE 90 UG/1
2 AEROSOL, METERED RESPIRATORY (INHALATION) EVERY 4 HOURS PRN
Qty: 18 G | Refills: 11 | Status: SHIPPED | OUTPATIENT
Start: 2022-12-05

## 2022-12-05 RX ORDER — LISINOPRIL 20 MG/1
20 TABLET ORAL DAILY
Qty: 90 TABLET | Refills: 3 | Status: SHIPPED | OUTPATIENT
Start: 2022-12-05 | End: 2023-11-21

## 2022-12-05 RX ORDER — LEVOTHYROXINE SODIUM 112 UG/1
112 TABLET ORAL DAILY
Qty: 90 TABLET | Refills: 3 | Status: SHIPPED | OUTPATIENT
Start: 2022-12-05 | End: 2022-12-08

## 2022-12-05 RX ORDER — METRONIDAZOLE 7.5 MG/G
GEL TOPICAL 2 TIMES DAILY
Qty: 45 G | Refills: 3 | Status: SHIPPED | OUTPATIENT
Start: 2022-12-05 | End: 2024-02-27

## 2022-12-05 RX ORDER — FLUTICASONE PROPIONATE 50 MCG
2 SPRAY, SUSPENSION (ML) NASAL DAILY
Qty: 18.2 ML | Refills: 11 | Status: SHIPPED | OUTPATIENT
Start: 2022-12-05 | End: 2022-12-06

## 2022-12-05 ASSESSMENT — ENCOUNTER SYMPTOMS
DIARRHEA: 0
FREQUENCY: 0
NERVOUS/ANXIOUS: 0
NAUSEA: 0
DYSURIA: 0
JOINT SWELLING: 0
HEADACHES: 0
CONSTIPATION: 1
HEMATURIA: 0
ARTHRALGIAS: 1
CHILLS: 0
FEVER: 0
HEMATOCHEZIA: 0
PARESTHESIAS: 0
ABDOMINAL PAIN: 0
PALPITATIONS: 0
SHORTNESS OF BREATH: 0
BREAST MASS: 0
EYE PAIN: 0
COUGH: 0
DIZZINESS: 0
MYALGIAS: 0
WEAKNESS: 0
SORE THROAT: 0
HEARTBURN: 0

## 2022-12-05 ASSESSMENT — ACTIVITIES OF DAILY LIVING (ADL): CURRENT_FUNCTION: NO ASSISTANCE NEEDED

## 2022-12-05 ASSESSMENT — PAIN SCALES - GENERAL: PAINLEVEL: NO PAIN (0)

## 2022-12-05 NOTE — LETTER
My Asthma Action Plan    Name: Laura Romero   YOB: 1955  Date: 12/5/2022   My doctor: Gloria Mcnally MD   My clinic: [facility]        My Rescue Medicine:   Albuterol inhaler (Proair/Ventolin/Proventil HFA)  2-4 puffs EVERY 4 HOURS as needed. Use a spacer if recommended by your provider.   My Asthma Severity:   Intermittent / Exercise Induced  Know your asthma triggers:              GREEN ZONE   Good Control    I feel good    No cough or wheeze    Can work, sleep and play without asthma symptoms       Take your asthma control medicine every day.     1. If exercise triggers your asthma, take your rescue medication    15 minutes before exercise or sports, and    During exercise if you have asthma symptoms  2. Spacer to use with inhaler: If you have a spacer, make sure to use it with your inhaler             YELLOW ZONE Getting Worse  I have ANY of these:    I do not feel good    Cough or wheeze    Chest feels tight    Wake up at night   1. Keep taking your Green Zone medications  2. Start taking your rescue medicine:    every 20 minutes for up to 1 hour. Then every 4 hours for 24-48 hours.  3. If you stay in the Yellow Zone for more than 12-24 hours, contact your doctor.  4. If you do not return to the Green Zone in 12-24 hours or you get worse, start taking your oral steroid medicine if prescribed by your provider.           RED ZONE Medical Alert - Get Help  I have ANY of these:    I feel awful    Medicine is not helping    Breathing getting harder    Trouble walking or talking    Nose opens wide to breathe       1. Take your rescue medicine NOW  2. If your provider has prescribed an oral steroid medicine, start taking it NOW  3. Call your doctor NOW  4. If you are still in the Red Zone after 20 minutes and you have not reached your doctor:    Take your rescue medicine again and    Call 911 or go to the emergency room right away    See your regular doctor within 2 weeks  of an Emergency Room or Urgent Care visit for follow-up treatment.          Annual Reminders:  Meet with Asthma Educator,  Flu Shot in the Fall, consider Pneumonia Vaccination for patients with asthma (aged 19 and older).    Pharmacy: [id number] - [place], [place] - [address]    Electronically signed by Gloria Mcnally MD   Date: 12/05/22                    Asthma Triggers  How To Control Things That Make Your Asthma Worse    Triggers are things that make your asthma worse.  Look at the list below to help you find your triggers and   what you can do about them. You can help prevent asthma flare-ups by staying away from your triggers.      Trigger                                                          What you can do   Cigarette Smoke  Tobacco smoke can make asthma worse. Do not allow smoking in your home, car or around you.  Be sure no one smokes at a child s day care or school.  If you smoke, ask your health care provider for ways to help you quit.  Ask family members to quit too.  Ask your health care provider for a referral to Quit Plan to help you quit smoking, or call 1-610-468-PLAN.     Colds, Flu, Bronchitis  These are common triggers of asthma. Wash your hands often.  Don t touch your eyes, nose or mouth.  Get a flu shot every year.     Dust Mites  These are tiny bugs that live in cloth or carpet. They are too small to see. Wash sheets and blankets in hot water every week.   Encase pillows and mattress in dust mite proof covers.  Avoid having carpet if you can. If you have carpet, vacuum weekly.   Use a dust mask and HEPA vacuum.   Pollen and Outdoor Mold  Some people are allergic to trees, grass, or weed pollen, or molds. Try to keep your windows closed.  Limit time out doors when pollen count is high.   Ask you health care provider about taking medicine during allergy season.     Animal Dander  Some people are allergic to skin flakes, urine or saliva  from pets with fur or feathers. Keep pets with fur or feathers out of your home.    If you can t keep the pet outdoors, then keep the pet out of your bedroom.  Keep the bedroom door closed.  Keep pets off cloth furniture and away from stuffed toys.     Mice, Rats, and Cockroaches  Some people are allergic to the waste from these pests.   Cover food and garbage.  Clean up spills and food crumbs.  Store grease in the refrigerator.   Keep food out of the bedroom.   Indoor Mold  This can be a trigger if your home has high moisture. Fix leaking faucets, pipes, or other sources of water.   Clean moldy surfaces.  Dehumidify basement if it is damp and smelly.   Smoke, Strong Odors, and Sprays  These can reduce air quality. Stay away from strong odors and sprays, such as perfume, powder, hair spray, paints, smoke incense, paint, cleaning products, candles and new carpet.   Exercise or Sports  Some people with asthma have this trigger. Be active!  Ask your doctor about taking medicine before sports or exercise to prevent symptoms.    Warm up for 5-10 minutes before and after sports or exercise.     Other Triggers of Asthma  Cold air:  Cover your nose and mouth with a scarf.  Sometimes laughing or crying can be a trigger.  Some medicines and food can trigger asthma.

## 2022-12-05 NOTE — PATIENT INSTRUCTIONS
Patient Education   Personalized Prevention Plan  You are due for the preventive services outlined below.  Your care team is available to assist you in scheduling these services.  If you have already completed any of these items, please share that information with your care team to update in your medical record.  Health Maintenance Due   Topic Date Due     Asthma Action Plan - yearly  Never done     Annual Wellness Visit  11/05/2022

## 2022-12-05 NOTE — PROGRESS NOTES
"Nursing Notes:   Henna Kendall LPN  2022  9:06 AM  Sign at exiting of workspace  Chief Complaint   Patient presents with     Physical     Medicare     Is fasting.     Medication Reconciliation: complete    Henna Kendall LPN          Romain Sanchez is a 67 year old, presenting for the following health issues:  Physical (Medicare)    Using the [employer] program and has lost 55 lb.  She has been doing this since early Lindsay.    Healthy Habits:     In general, how would you rate your overall health?  Good    Frequency of exercise:  2-3 days/week    Duration of exercise:  15-30 minutes    Do you usually eat at least 4 servings of fruit and vegetables a day, include whole grains    & fiber and avoid regularly eating high fat or \"junk\" foods?  Yes    Taking medications regularly:  Yes    Medication side effects:  None    Ability to successfully perform activities of daily living:  No assistance needed    Home Safety:  No safety concerns identified    Hearing Impairment:  No hearing concerns    In the past 6 months, have you been bothered by leaking of urine?  No    In general, how would you rate your overall mental or emotional health?  Excellent      PHQ-2 Total Score: 0       Annual Wellness Visit  Patient has been advised of split billing requirements and indicates understanding: Yes     Are you in the first 12 months of your Medicare Part B coverage?  No    Physical Health:    See above     Mental Health:    See above   PHQ-2 Score: 0    Do you feel safe in your environment? Yes    Have you ever done Advance Care Planning? (For example, a Health Directive, POLST, or a discussion with a medical provider or your loved ones about your wishes)? No, advance care planning information given to patient to review.  Patient declined advance care planning discussion at this time.    Fall risk:  Fallen 2 or more times in the past year?: No  Any fall with injury in the past year?: No    Cognitive Screenin) Repeat " "3 items (Leader, Season, Table)    2) Clock draw: NORMAL  3) 3 item recall: Recalls 3 objects  Results: 3 items recalled: COGNITIVE IMPAIRMENT LESS LIKELY    Mini-CogTM Copyright S Linda. Licensed by the author for use in [facility]; reprinted with permission (osvaldo@[facility]). All rights reserved.      Do you have sleep apnea, excessive snoring or daytime drowsiness?: no    Current providers sharing in care for this patient include:   Patient Care Team:  Gloria Mcnally MD as PCP - General (Family Practice)  Gloria Mcnally MD as Assigned PCP  Lorelei Guzman PA-C as Assigned Surgical Provider  Solomon Donovan MD as Assigned Musculoskeletal Provider    Patient has been advised of split billing requirements and indicates understanding: Yes      Review of Systems   Constitutional: Negative for chills and fever.   HENT: Negative for congestion, ear pain, hearing loss and sore throat.    Eyes: Positive for visual disturbance. Negative for pain.   Respiratory: Negative for cough and shortness of breath.    Cardiovascular: Negative for chest pain, palpitations and peripheral edema.   Gastrointestinal: Positive for constipation. Negative for abdominal pain, diarrhea, heartburn, hematochezia and nausea.   Breasts:  Negative for tenderness, breast mass and discharge.   Genitourinary: Negative for dysuria, frequency, genital sores, hematuria, pelvic pain, urgency, vaginal bleeding and vaginal discharge.   Musculoskeletal: Positive for arthralgias. Negative for joint swelling and myalgias.   Skin: Negative for rash.   Neurological: Negative for dizziness, weakness, headaches and paresthesias.   Psychiatric/Behavioral: Negative for mood changes. The patient is not nervous/anxious.         Objective    /80   Pulse 72   Temp 97.5  F (36.4  C) (Tympanic)   Resp 16   Ht 1.613 m (5' 3.5\")   Wt 84.2 kg (185 lb 9.6 oz)   LMP  (LMP Unknown)   SpO2 98%   Breastfeeding No   BMI 32.36 kg/m  "   Body mass index is 32.36 kg/m .  Physical Exam  Constitutional:       General: She is not in acute distress.     Appearance: She is well-developed.   HENT:      Head: Normocephalic.      Right Ear: Tympanic membrane and external ear normal.      Left Ear: Tympanic membrane and external ear normal.      Nose: Nose normal.      Mouth/Throat:      Pharynx: No oropharyngeal exudate.   Eyes:      General:         Right eye: No discharge.         Left eye: No discharge.      Conjunctiva/sclera: Conjunctivae normal.      Pupils: Pupils are equal, round, and reactive to light.   Neck:      Thyroid: No thyromegaly.      Trachea: No tracheal deviation.   Cardiovascular:      Rate and Rhythm: Normal rate and regular rhythm.      Pulses: Normal pulses.      Heart sounds: Normal heart sounds, S1 normal and S2 normal. No murmur heard.    No friction rub. No gallop. No S3 or S4 sounds.   Pulmonary:      Effort: Pulmonary effort is normal. No respiratory distress.      Breath sounds: Normal breath sounds. No wheezing or rales.      Comments: Breast exam:  No masses palpable bilaterally.  No skin changes, tethering or axillary lymphadenopathy bilaterally.    Abdominal:      General: Bowel sounds are normal. There is no distension.      Palpations: Abdomen is soft. There is no mass.      Tenderness: There is no abdominal tenderness.   Genitourinary:     Comments: Pelvic/Rectal exams deferred per patient.  Musculoskeletal:         General: Normal range of motion.      Cervical back: Neck supple.   Lymphadenopathy:      Cervical: No cervical adenopathy.   Skin:     General: Skin is warm and dry.      Findings: No rash.   Neurological:      Mental Status: She is alert and oriented to person, place, and time.      Motor: No abnormal muscle tone.      Deep Tendon Reflexes: Reflexes are normal and symmetric.   Psychiatric:         Thought Content: Thought content normal.         Judgment: Judgment normal.              Review current  opioid prescriptions    For a patient with a current opioid prescription:    Reviewed potential Opioid Use Disorder (OUD) risk factors: Yes n/a    Evaluate their pain severity and current treatment plan:     Provide information on non-opioid treatment options:    Refer to a specialist, as appropriate:    Get more information on pain management in the [facility] Pain Management Best Practices Inter-agency Task Force Report    Screen for potential Substance Use Disorders (SUDs)    Reviewed the patient s potential risk factors for SUDs: Yes n/a    Refer to treatment or specialist, as appropriate:     A screening tool isn t required but you may use one:  Find more information in the National [place] on Drug Abuse Screening and Assessment Tools Chart    ACT Total Scores 8/27/2020 12/3/2020 12/4/2022   ACT TOTAL SCORE (Goal Greater than or Equal to 20) 25 25 25   In the past 12 months, how many times did you visit the emergency room for your asthma without being admitted to the hospital? 0 0 0   In the past 12 months, how many times were you hospitalized overnight because of your asthma? 0 0 0       Assessment & Plan   Laura Romero is a 67 year old, presenting for the following health issues:      ICD-10-CM    1. Encounter for Medicare annual wellness exam  Z00.00       2. Essential hypertension  I10 lisinopril (ZESTRIL) 20 MG tablet     Comprehensive metabolic panel     Comprehensive metabolic panel      3. Hypercholesterolemia  E78.00 rosuvastatin (CRESTOR) 5 MG tablet     Comprehensive metabolic panel     Lipid Profile     Comprehensive metabolic panel     Lipid Profile      4. Hypothyroidism, unspecified type  E03.9 levothyroxine (SYNTHROID/LEVOTHROID) 112 MCG tablet     TSH with free T4 reflex     TSH with free T4 reflex      5. Perennial allergic rhinitis  J30.89 albuterol (PROAIR HFA/PROVENTIL HFA/VENTOLIN HFA) 108 (90 Base) MCG/ACT inhaler     budesonide (PULMICORT) 0.5 MG/2ML neb solution     fluticasone (FLONASE) 50  MCG/ACT nasal spray      6. Seasonal allergic rhinitis, unspecified trigger  J30.2 budesonide (PULMICORT) 0.5 MG/2ML neb solution      7. Rosacea  L71.9 metroNIDAZOLE (METROGEL) 0.75 % external gel      8. Screening for deficiency anemia  Z13.0 CBC with Platelets & Differential     CBC with Platelets & Differential        1.  Mammogram is up to date, last completed on 5/24/22.  DEXA is up to date, last completed on 10/20/20 and was normal at that time.  Last colonoscopy was on 10/26/2020 and tubular adenoma was noted.  5 year follow up recommended.  Pap Smear deferred due to age >65 and prior hysterectomy.  Abdominal aortic aneurysm screening completed on 10/20/2020 and was normal.  Vaccines reviewed and are all up to date.  2.  Blood pressure is stable.  Refill as above.  Labs as above.  3.  She had previously had elevated liver function tests while on Atorvastatin.  Now is on crestor, which is refilled today.  Labs as above.  4.  TSH as above.  Refill as above.  5.  Stable.  Refills as above.  6.  Stable.  Refills as above.  7.  Refill as above.  8.  Complete Blood Count as above.        Encouraged weight loss and regular exercise.     Return in about 53 weeks (around 12/11/2023) for Annual Wellness Visit.    Gloria Mcnally MD  [facility]

## 2022-12-05 NOTE — NURSING NOTE
Chief Complaint   Patient presents with     Physical     Medicare     Is fasting.     Medication Reconciliation: complete    Henna Kendall LPN

## 2022-12-06 RX ORDER — FLUTICASONE PROPIONATE 50 MCG
SPRAY, SUSPENSION (ML) NASAL
Qty: 48 G | Refills: 3 | Status: SHIPPED | OUTPATIENT
Start: 2022-12-06 | End: 2023-12-09

## 2022-12-07 ENCOUNTER — MYC MEDICAL ADVICE (OUTPATIENT)
Dept: FAMILY MEDICINE | Facility: OTHER | Age: 67
End: 2022-12-07

## 2022-12-07 ENCOUNTER — HOSPITAL ENCOUNTER (OUTPATIENT)
Dept: PHYSICAL THERAPY | Facility: OTHER | Age: 67
Setting detail: THERAPIES SERIES
Discharge: HOME | End: 2022-12-07
Attending: ORTHOPAEDIC SURGERY
Payer: MEDICARE

## 2022-12-07 DIAGNOSIS — E03.9 HYPOTHYROIDISM, UNSPECIFIED TYPE: ICD-10-CM

## 2022-12-07 PROCEDURE — 97110 THERAPEUTIC EXERCISES: CPT | Mod: GP

## 2022-12-08 RX ORDER — LEVOTHYROXINE SODIUM 100 UG/1
100 TABLET ORAL DAILY
Qty: 90 TABLET | Refills: 3 | COMMUNITY
Start: 2022-12-08 | End: 2023-01-16

## 2022-12-28 ENCOUNTER — HOSPITAL ENCOUNTER (OUTPATIENT)
Dept: PHYSICAL THERAPY | Facility: OTHER | Age: 67
Setting detail: THERAPIES SERIES
Discharge: HOME | End: 2022-12-28
Attending: ORTHOPAEDIC SURGERY
Payer: MEDICARE

## 2022-12-28 PROCEDURE — 97110 THERAPEUTIC EXERCISES: CPT | Mod: GP

## 2022-12-28 NOTE — PROGRESS NOTES
"[facility]    Outpatient Physical Therapy Discharge Note    Patient: Laura Romero  : 1955    Beginning/End Dates of Reporting Period:  10-27-22 to 2022     Referring Provider: Solomon Donovan MD    Therapy Diagnosis: Impaired mobility due to chronic R knee pain, OA.     Client Self Report: Notes she has not been good at her exercises over the holidays--it has been busy with family. Notes that her knee is still good. She is  able to go she is able to go down stairs forward with the rail. Feels  secure with no fear of falling. Notes that she still goes sideways if she has to carry something.    Objective Measurements:  Objective Measure: ROM  Details: WFL for exercises. 0-125 degrees L  Objective Measure: strength  Details: Good alignment at the knee with exercise, continues to have significant pes planus. Strength not formally tested by functional with exercise program.  Objective Measure: balance  Details: Decreased SLS on both LEs but pt notes she has not been consistent with practicing this.  Able to perform tandem stance.     Home program: QS with towel roll, supine SLR, supine bridge, SL clam, TKE, seated HS stretch with red theraband, standing terminal extension red theraband, 1/4 wall squat (dc 22), heel slides, standing hip abd, standing hip ext, mini lunge--R only. Tandem stance. SLS with UE support. Bilateral calf raises. forward and lateral step ups--4\" height    Goals:  Goal Identifier HEP   Goal Description Pt to display independence and compliance with HEP 5 of 7 days per week to assist her in building LE strength to assist in prolonging time before TKA, improve functional mobility   Target Date 23   Date Met      Progress (detail required for progress note): Compliance had been good prior to the holidays. Pt notes she will get back into her program consistently this week. "     Goal Identifier Stairs   Goal Description Pt to display improved LE strength to at least 4+/5 and improved balance to allow her to descend stairs with a railing facing anterior with 50% reported decrease in fear of falling.   Target Date 01/22/23   Date Met  12/28/22   Progress (detail required for progress note): met: ascending and descending stairs forward with a railing unless she has to carry something--then still descends sideways. No longer fearful.     Goal Identifier Gait   Goal Description Pt to report the ability to walk down  the hill in her yard with improved R knee control, minimal pain.   Target Date 01/22/23   Date Met      Progress (detail required for progress note): Not addressed due to the winter weather, snow.         Plan:  Other: No further PT scheduled. Will hold chart X 4 weeks. Pt to call if she needs to schedule further visits or if she has questions/concerns. She will continue HEP 1-2 times daily.    Discharge:  No    Kim Medina PT on 12/28/2022 at 8:59 AM       1/24/2023  Addendum: Discharge Note  S: Patient was last seen in PT on 12-28-22. Plan at that time was to hold her chart X 4 weeks and she would call to schedule if needed. She has not scheduled further PT. See above note for subjective report on 12-28-22.    O: Refer to above note for objective status at the time of her last visit.    A: Pt was seen in PT due to chronic R knee pain, OA. She did well with PT/home program, improving her strength and function. Good compliance with HEP was noted other than over the holidays. See above for progress toward goals.     P: Discharge from PT. Pt to continue HEP.     Kim Medina PT on 1/24/2023 at 12:16 PM

## 2023-01-03 ENCOUNTER — OFFICE VISIT (OUTPATIENT)
Dept: FAMILY MEDICINE | Facility: OTHER | Age: 68
End: 2023-01-03
Attending: FAMILY MEDICINE
Payer: COMMERCIAL

## 2023-01-03 VITALS
SYSTOLIC BLOOD PRESSURE: 118 MMHG | HEIGHT: 64 IN | BODY MASS INDEX: 32.1 KG/M2 | DIASTOLIC BLOOD PRESSURE: 60 MMHG | TEMPERATURE: 97.5 F | RESPIRATION RATE: 12 BRPM | HEART RATE: 68 BPM | WEIGHT: 188 LBS | OXYGEN SATURATION: 99 %

## 2023-01-03 DIAGNOSIS — I10 PRIMARY HYPERTENSION: ICD-10-CM

## 2023-01-03 DIAGNOSIS — Z01.818 PREOP GENERAL PHYSICAL EXAM: Primary | ICD-10-CM

## 2023-01-03 LAB
ATRIAL RATE - MUSE: 60 BPM
DIASTOLIC BLOOD PRESSURE - MUSE: NORMAL MMHG
INTERPRETATION ECG - MUSE: NORMAL
P AXIS - MUSE: 46 DEGREES
PR INTERVAL - MUSE: 168 MS
QRS DURATION - MUSE: 98 MS
QT - MUSE: 434 MS
QTC - MUSE: 434 MS
R AXIS - MUSE: -17 DEGREES
SYSTOLIC BLOOD PRESSURE - MUSE: NORMAL MMHG
T AXIS - MUSE: 15 DEGREES
VENTRICULAR RATE- MUSE: 60 BPM

## 2023-01-03 PROCEDURE — G0463 HOSPITAL OUTPT CLINIC VISIT: HCPCS

## 2023-01-03 PROCEDURE — 93005 ELECTROCARDIOGRAM TRACING: CPT | Performed by: FAMILY MEDICINE

## 2023-01-03 PROCEDURE — 99213 OFFICE O/P EST LOW 20 MIN: CPT | Performed by: FAMILY MEDICINE

## 2023-01-03 PROCEDURE — 93010 ELECTROCARDIOGRAM REPORT: CPT | Performed by: STUDENT IN AN ORGANIZED HEALTH CARE EDUCATION/TRAINING PROGRAM

## 2023-01-03 ASSESSMENT — PAIN SCALES - GENERAL: PAINLEVEL: NO PAIN (0)

## 2023-01-03 NOTE — PROGRESS NOTES
[facility]  [address]  [place] [address]  Phone: [phone]  Fax: [phone]  Primary Provider: Gloria Sosa  Pre-op Performing Provider: GLORIA SOSA      PREOPERATIVE EVALUATION:  Today's date: 1/3/2023    Laura Romero is a 67 year old female who presents for a preoperative evaluation.    Surgical Information:  Surgery/Procedure: Cataract - left eye  Surgery Location: [place] [facility]  Surgeon: Reyna  Surgery Date: 1/10/2023  Time of Surgery: TBD  Where patient plans to recover: At home with family  Fax number for surgical facility: [phone]    Type of Anesthesia Anticipated: to be determined    Assessment & Plan     The proposed surgical procedure is considered LOW risk.    Preop general physical exam  She is cleared for surgery as scheduled.    - EKG 12-lead, tracing only (Same Day)    Primary hypertension  Stable.  EKG as noted.   - EKG 12-lead, tracing only (Same Day)           Risks and Recommendations:  The patient has the following additional risks and recommendations for perioperative complications:   - No identified additional risk factors other than previously addressed    Medication Instructions:      No ibuprofen, aleve or aspirin for at least 7 days prior to surgery.    No vitamins or supplements for at least 7 days prior to surgery.    The morning of surgery, take only Levothyroxine and lisinopril with a small sip of water.    RECOMMENDATION:  APPROVAL GIVEN to proceed with proposed procedure, without further diagnostic evaluation.      Subjective     HPI related to upcoming procedure: has noticed a gradual worsening of her vision over the past year.  Far vision is ok, but very difficult time reading.  Has been having headaches from eye strain.    Preop Questions 12/30/2022   1. Have you ever had a heart attack or stroke? No   2. Have you ever had surgery on your heart or blood vessels, such as a stent placement, a  coronary artery bypass, or surgery on an artery in your head, neck, heart, or legs? No   3. Do you have chest pain with activity? No   4. Do you have a history of  heart failure? No   5. Do you currently have a cold, bronchitis or symptoms of other infection? No   6. Do you have a cough, shortness of breath, or wheezing? No   7. Do you or anyone in your family have previous history of blood clots? YES - cousins/uncles, no first degree relatives.   8. Do you or does anyone in your family have a serious bleeding problem such as prolonged bleeding following surgeries or cuts? No   9. Have you ever had problems with anemia or been told to take iron pills? No   10. Have you had any abnormal blood loss such as black, tarry or bloody stools, or abnormal vaginal bleeding? No   11. Have you ever had a blood transfusion? No   12. Are you willing to have a blood transfusion if it is medically needed before, during, or after your surgery? Yes   13. Have you or any of your relatives ever had problems with anesthesia? No   14. Do you have sleep apnea, excessive snoring or daytime drowsiness? No   15. Do you have any artifical heart valves or other implanted medical devices like a pacemaker, defibrillator, or continuous glucose monitor? No   16. Do you have artificial joints? No   17. Are you allergic to latex? No       Health Care Directive:  Patient does not have a Health Care Directive or Living Will.    Preoperative Review of :   reviewed - no record of controlled substances prescribed.      Review of Systems  CONSTITUTIONAL: NEGATIVE for fever, chills, change in weight  INTEGUMENTARY/SKIN: NEGATIVE for worrisome rashes, moles or lesions  EYES: NEGATIVE for vision changes or irritation  ENT/MOUTH: NEGATIVE for ear, mouth and throat problems  RESP: NEGATIVE for significant cough or SOB  CV: NEGATIVE for chest pain, palpitations or peripheral edema  GI: NEGATIVE for nausea, abdominal pain, heartburn, or change in bowel  habits  : NEGATIVE for frequency, dysuria, or hematuria  MUSCULOSKELETAL: NEGATIVE for significant arthralgias or myalgia  NEURO: NEGATIVE for weakness, dizziness or paresthesias  ENDOCRINE: NEGATIVE for temperature intolerance, skin/hair changes  HEME: NEGATIVE for bleeding problems  PSYCHIATRIC: NEGATIVE for changes in mood or affect    Patient Active Problem List    Diagnosis Date Noted     H/O adenomatous polyp of colon 10/26/2020     Priority: Medium     Mild intermittent extrinsic asthma without complication 04/24/2019     Priority: Medium     Hypercholesterolemia 02/08/2018     Priority: Medium     Obesity 02/08/2018     Priority: Medium     Overview:   Obesity, BMI 41.53;   Has joined exercise program and working on dietary changes        Rosacea 02/08/2018     Priority: Medium     Asymptomatic varicose veins 02/08/2018     Priority: Medium     Overview:   preloader comment: selections available to preload differs from paper chart.    *Varicosities of the left leg.       ACP (advance care planning) 05/12/2016     Priority: Medium     Advance Care Planning 5/12/2016: ACP Review of Chart / Resources Provided:  Reviewed chart for advance care plan.  Laura Romero has been provided information and resources to begin or update their advance care plan.  Added by Siri Westbrook             S/P nasal septoplasty 03/27/2013     Priority: Medium     Perennial allergic rhinitis 03/27/2013     Priority: Medium     Nephrolithiasis 12/12/2012     Priority: Medium     Allergic rhinitis 02/03/2012     Priority: Medium     Pain in joint, lower leg 05/04/2011     Priority: Medium     Overview:   possible meniscal tear       HTN (hypertension) 10/04/2010     Priority: Medium     Hypothyroidism 10/04/2010     Priority: Medium      Past Medical History:   Diagnosis Date     Bacteremia      12/04,Negative coag testing     Calculus of kidney     age 19 and age 50     Chronic sinusitis     5/20/2011     Deviated nasal septum      8/3/2012     Deviated nasal septum     sever turbinate hypertophy and superior septal deviation to right with recurrent sinusitis     Gastro-esophageal reflux disease without esophagitis     8/31/2009     Personal history of other medical treatment (CODE)     rectocele with cervical prolapse     Phlebitis and thrombophlebitis     1/6/11, after varicose vein ablation     Phlebitis and thrombophlebitis of lower extremities, unspecified (CODE)     1/6/2011,Greater saphenous vein left lower extremity     Pneumonia     7/27/2012     Past Surgical History:   Procedure Laterality Date     COLONOSCOPY      10/13/08,which was normal.  Next colonoscopy due in 2018.     COLONOSCOPY N/A 10/26/2020    F/U 2025 tubular adenoma     HYSTERECTOMY VAGINAL      04/27/05, anterior repair with posterior perineorrhaphy and vaginal vault suspension     LAPAROSCOPIC TUBAL LIGATION      No Comments Provided     OTHER SURGICAL HISTORY      ,LITHOTRIPSY, with stent placement for right ureteral stone     OTHER SURGICAL HISTORY      8/11/05,833975,REMOVE,ureteral Stent removal     SEPTOPLASTY      8/2012,with turbinate reduction;  Dr. England     TONSILLECTOMY      No Comments Provided     Current Outpatient Medications   Medication Sig Dispense Refill     albuterol (PROAIR HFA/PROVENTIL HFA/VENTOLIN HFA) 108 (90 Base) MCG/ACT inhaler Inhale 2 puffs into the lungs every 4 hours as needed for shortness of breath / dyspnea or wheezing Inhale 1-2 puff 18 g 11     budesonide (PULMICORT) 0.5 MG/2ML neb solution SPRAY 2MLS IN NOSTRIL TWICE DAILY MAKE 240CC MICHAEL MED SINUS IRRI. MIX 2ML VIAL OF BUDESONIDE 0.5MG RINSE 1-2 X DAILY AS NEEDED X 2-4 WEEKS 360 mL 1     cetirizine (ZYRTEC) 10 MG tablet Take 10 mg by mouth daily       Cholecalciferol (VITAMIN D PO) Take 1 tablet by mouth daily        fluticasone (FLONASE) 50 MCG/ACT nasal spray SHAKE LIQUID AND USE 2 SPRAYS IN EACH NOSTRIL DAILY 48 g 3     levothyroxine (SYNTHROID/LEVOTHROID) 100 MCG  "tablet Take 1 tablet (100 mcg) by mouth daily 90 tablet 3     lisinopril (ZESTRIL) 20 MG tablet Take 1 tablet (20 mg) by mouth daily 90 tablet 3     metroNIDAZOLE (METROGEL) 0.75 % external gel Apply topically 2 times daily 45 g 3     Multiple Vitamin (MULTIVITAMIN) per tablet Take 1 tablet by mouth daily. Food       rosuvastatin (CRESTOR) 5 MG tablet Take 1 tablet (5 mg) by mouth daily 90 tablet 3       Allergies   Allergen Reactions     Azithromycin Hives     Zithromax          Social History     Tobacco Use     Smoking status: Never     Smokeless tobacco: Never   Substance Use Topics     Alcohol use: Yes     Alcohol/week: 0.0 standard drinks     Comment: Alcoholic Drinks/day: infrequent social     Family History   Problem Relation Age of Onset     Heart Disease Mother 49        Heart Disease,MI,  at 69 of a massive MI, she had been a heavy smoker     Hypertension Mother         Hypertension     Arthritis Father         Arthritis,Rheumatoid arthritis     Heart Disease Father         Heart Disease, MI     Hypertension Father         Hypertension     Hypertension Sister      Hypertension Sister         Hypertension     Hypertension Brother      Hypertension Brother         Hypertension     Hypertension Brother      Other - See Comments Maternal Grandmother          of blood clot at childbirth around age 36.     Heart Disease Maternal Grandfather          at 62 of MI     Other - See Comments Paternal Grandmother          after cholecystectomy, very obese     Other - See Comments Paternal Grandfather         had been very anemic prior to death     Other - See Comments Daughter         GI Disease,Ulcerative colitis diagnosed      Other - See Comments Other         Did have a blood clot     Breast Cancer No family hx of         Cancer-breast     History   Drug Use Unknown     Comment: Drug use: No         Objective     /60   Pulse 68   Temp 97.5  F (36.4  C)   Resp 12   Ht 1.613 m (5' 3.5\") "   Wt 85.3 kg (188 lb)   LMP  (LMP Unknown)   SpO2 99%   BMI 32.78 kg/m      Physical Exam    GENERAL APPEARANCE: healthy, alert and no distress     EYES: EOMI, PERRL     HENT: ear canals and TM's normal and nose and mouth without ulcers or lesions     NECK: no adenopathy, no asymmetry, masses, or scars and thyroid normal to palpation     RESP: lungs clear to auscultation - no rales, rhonchi or wheezes     CV: regular rates and rhythm, normal S1 S2, no S3 or S4 and no murmur, click or rub     ABDOMEN:  soft, nontender, no HSM or masses and bowel sounds normal     MS: extremities normal- no gross deformities noted, no evidence of inflammation in joints, FROM in all extremities.     SKIN: no suspicious lesions or rashes     NEURO: Normal strength and tone, sensory exam grossly normal, mentation intact and speech normal     PSYCH: mentation appears normal. and affect normal/bright     LYMPHATICS: No cervical adenopathy    Recent Labs   Lab Test 12/05/22  0946 01/19/22  1625 11/05/21  0924   HGB 14.5 14.9 14.5    249 291     --  140   POTASSIUM 4.3  --  4.5   CR 0.65  --  0.73        Diagnostics:  Recent Results (from the past 720 hour(s))   Comprehensive metabolic panel    Collection Time: 12/05/22  9:46 AM   Result Value Ref Range    Sodium 142 136 - 145 mmol/L    Potassium 4.3 3.4 - 5.3 mmol/L    Chloride 104 98 - 107 mmol/L    Carbon Dioxide (CO2) 28 22 - 29 mmol/L    Anion Gap 10 7 - 15 mmol/L    Urea Nitrogen 19.9 8.0 - 23.0 mg/dL    Creatinine 0.65 0.51 - 0.95 mg/dL    Calcium 9.2 8.8 - 10.2 mg/dL    Glucose 84 70 - 99 mg/dL    Alkaline Phosphatase 80 35 - 104 U/L    AST 25 10 - 35 U/L    ALT 22 10 - 35 U/L    Protein Total 6.9 6.4 - 8.3 g/dL    Albumin 4.2 3.5 - 5.2 g/dL    Bilirubin Total 0.6 <=1.2 mg/dL    GFR Estimate >90 >60 mL/min/1.73m2   TSH with free T4 reflex    Collection Time: 12/05/22  9:46 AM   Result Value Ref Range    TSH 0.30 0.30 - 4.20 uIU/mL   Lipid Profile    Collection Time:  12/05/22  9:46 AM   Result Value Ref Range    Cholesterol 156 <200 mg/dL    Triglycerides 49 <150 mg/dL    Direct Measure HDL 63 >=50 mg/dL    LDL Cholesterol Calculated 83 <=100 mg/dL    Non HDL Cholesterol 93 <130 mg/dL   CBC with platelets and differential    Collection Time: 12/05/22  9:46 AM   Result Value Ref Range    WBC Count 5.9 4.0 - 11.0 10e3/uL    RBC Count 5.04 3.80 - 5.20 10e6/uL    Hemoglobin 14.5 11.7 - 15.7 g/dL    Hematocrit 44.6 35.0 - 47.0 %    MCV 89 78 - 100 fL    MCH 28.8 26.5 - 33.0 pg    MCHC 32.5 31.5 - 36.5 g/dL    RDW 14.9 10.0 - 15.0 %    Platelet Count 245 150 - 450 10e3/uL    % Neutrophils 54 %    % Lymphocytes 36 %    % Monocytes 7 %    % Eosinophils 2 %    % Basophils 1 %    % Immature Granulocytes 0 %    NRBCs per 100 WBC 0 <1 /100    Absolute Neutrophils 3.2 1.6 - 8.3 10e3/uL    Absolute Lymphocytes 2.1 0.8 - 5.3 10e3/uL    Absolute Monocytes 0.4 0.0 - 1.3 10e3/uL    Absolute Eosinophils 0.1 0.0 - 0.7 10e3/uL    Absolute Basophils 0.1 0.0 - 0.2 10e3/uL    Absolute Immature Granulocytes 0.0 <=0.4 10e3/uL    Absolute NRBCs 0.0 10e3/uL   Extra Serum Separator Tube (SST)    Collection Time: 12/05/22  9:46 AM   Result Value Ref Range    Hold Specimen [facility]       EKG today showed normal sinus rhythm with a rate of 60 beats per minute.  No ST or T wave changes noted.      Results for orders placed or performed in visit on 01/03/23   EKG 12-lead, tracing only (Same Day)     Status: None (Preliminary result)   Result Value Ref Range    Systolic Blood Pressure  mmHg    Diastolic Blood Pressure  mmHg    Ventricular Rate 60 BPM    Atrial Rate 60 BPM    WV Interval 168 ms    QRS Duration 98 ms     ms    QTc 434 ms    P Axis 46 degrees    R AXIS -17 degrees    T Axis 15 degrees    Interpretation ECG       Sinus rhythm  Normal ECG  When compared with ECG of 26-APR-2022 09:18,  No significant change was found          Revised Cardiac Risk Index (RCRI):  The patient has the following serious  cardiovascular risks for perioperative complications:   - No serious cardiac risks = 0 points     RCRI Interpretation: 0 points: Class I (very low risk - 0.4% complication rate)           Signed Electronically by: Gloria Mcnally MD  Copy of this evaluation report is provided to requesting physician.

## 2023-01-03 NOTE — PATIENT INSTRUCTIONS
For informational purposes only. Not to replace the advice of your health care provider. Copyright   2003,  [facility]. All rights reserved. Clinically reviewed by Shannan Diaz MD. [place] [id number] - REV .  Preparing for Your Surgery  Getting started  A nurse will call you to review your health history and instructions. They will give you an arrival time based on your scheduled surgery time. Please be ready to share:  Your doctor's clinic name and phone number  Your medical, surgical, and anesthesia history  A list of allergies and sensitivities  A list of medicines, including herbal treatments and over-the-counter drugs  Whether the patient has a legal guardian (ask how to send us the papers in advance)  Please tell us if you're pregnant--or if there's any chance you might be pregnant. Some surgeries may injure a fetus (unborn baby), so they require a pregnancy test. Surgeries that are safe for a fetus don't always need a test, and you can choose whether to have one.   If you have a child who's having surgery, please ask for a copy of Preparing for Your Child's Surgery.    Preparing for surgery  Within 10 to 30 days of surgery: Have a pre-op exam (sometimes called an H&P, or History and Physical). This can be done at a clinic or pre-operative center.  If you're having a , you may not need this exam. Talk to your care team.  At your pre-op exam, talk to your care team about all medicines you take. If you need to stop any medicines before surgery, ask when to start taking them again.  We do this for your safety. Many medicines can make you bleed too much during surgery. Some change how well surgery (anesthesia) drugs work.  Call your insurance company to let them know you're having surgery. (If you don't have insurance, call [phone].)  Call your clinic if there's any change in your health. This includes signs of a cold or flu (sore throat, runny nose, cough, rash, fever). It  also includes a scrape or scratch near the surgery site.  If you have questions on the day of surgery, call your hospital or surgery center.  Eating and drinking guidelines  For your safety: Unless your surgeon tells you otherwise, follow the guidelines below.  Eat and drink as usual until 8 hours before you arrive for surgery. After that, no food or milk.  Drink clear liquids until 2 hours before you arrive. These are liquids you can see through, like water, Gatorade, and Propel Water. They also include plain black coffee and tea (no cream or milk), candy, and breath mints. You can spit out gum when you arrive.  If you drink alcohol: Stop drinking it the night before surgery.  If your care team tells you to take medicine on the morning of surgery, it's okay to take it with a sip of water.  Preventing infection  Shower or bathe the night before and morning of your surgery. Follow the instructions your clinic gave you. (If no instructions, use regular soap.)  Don't shave or clip hair near your surgery site. We'll remove the hair if needed.  Don't smoke or vape the morning of surgery. You may chew nicotine gum up to 2 hours before surgery. A nicotine patch is okay.  Note: Some surgeries require you to completely quit smoking and nicotine. Check with your surgeon.  Your care team will make every effort to keep you safe from infection. We will:  Clean our hands often with soap and water (or an alcohol-based hand rub).  Clean the skin at your surgery site with a special soap that kills germs.  Give you a special gown to keep you warm. (Cold raises the risk of infection.)  Wear special hair covers, masks, gowns and gloves during surgery.  Give antibiotic medicine, if prescribed. Not all surgeries need antibiotics.  What to bring on the day of surgery  Photo ID and insurance card  Copy of your health care directive, if you have one  Glasses and hearing aids (bring cases)  You can't wear contacts during surgery  Inhaler and  eye drops, if you use them (tell us about these when you arrive)  CPAP machine or breathing device, if you use them  A few personal items, if spending the night  If you have . . .  A pacemaker, ICD (cardiac defibrillator) or other implant: Bring the ID card.  An implanted stimulator: Bring the remote control.  A legal guardian: Bring a copy of the certified (court-stamped) guardianship papers.  Please remove any jewelry, including body piercings. Leave jewelry and other valuables at home.  If you're going home the day of surgery  You must have a responsible adult drive you home. They should stay with you overnight as well.  If you don't have someone to stay with you, and you aren't safe to go home alone, we may keep you overnight. Insurance often won't pay for this.  After surgery  If it's hard to control your pain or you need more pain medicine, please call your surgeon's office.  Questions?   If you have any questions for your care team, list them here: _________________________________________________________________________________________________________________________________________________________________________ ____________________________________ ____________________________________ ____________________________________      No ibuprofen, aleve or aspirin for at least 7 days prior to surgery.  No vitamins or supplements for at least 7 days prior to surgery.  The morning of surgery, take only Levothyroxine and lisinopril with a small sip of water.

## 2023-01-03 NOTE — NURSING NOTE
Patient presents to clinic for Pre-Op Exam.  Lindsey Pitts LPN ....................  1/3/2023   9:03 AM

## 2023-01-16 ENCOUNTER — LAB (OUTPATIENT)
Dept: LAB | Facility: OTHER | Age: 68
End: 2023-01-16
Attending: FAMILY MEDICINE
Payer: MEDICARE

## 2023-01-16 DIAGNOSIS — E03.9 HYPOTHYROIDISM, UNSPECIFIED TYPE: ICD-10-CM

## 2023-01-16 LAB
T4 FREE SERPL-MCNC: 1.14 NG/DL (ref 0.9–1.7)
TSH SERPL DL<=0.005 MIU/L-ACNC: 10.85 UIU/ML (ref 0.3–4.2)

## 2023-01-16 PROCEDURE — 84439 ASSAY OF FREE THYROXINE: CPT | Mod: ZL

## 2023-01-16 PROCEDURE — 84443 ASSAY THYROID STIM HORMONE: CPT | Mod: ZL

## 2023-01-16 PROCEDURE — 36415 COLL VENOUS BLD VENIPUNCTURE: CPT | Mod: ZL

## 2023-01-16 RX ORDER — LEVOTHYROXINE SODIUM 112 UG/1
TABLET ORAL
Qty: 45 TABLET | Refills: 3 | Status: SHIPPED | OUTPATIENT
Start: 2023-01-16 | End: 2023-06-01

## 2023-01-16 RX ORDER — LEVOTHYROXINE SODIUM 100 UG/1
TABLET ORAL
Qty: 45 TABLET | Refills: 3 | Status: SHIPPED | OUTPATIENT
Start: 2023-01-16 | End: 2023-06-01

## 2023-02-20 ENCOUNTER — LAB (OUTPATIENT)
Dept: LAB | Facility: OTHER | Age: 68
End: 2023-02-20
Attending: FAMILY MEDICINE
Payer: MEDICARE

## 2023-02-20 DIAGNOSIS — E03.9 HYPOTHYROIDISM, UNSPECIFIED TYPE: ICD-10-CM

## 2023-02-20 LAB — TSH SERPL DL<=0.005 MIU/L-ACNC: 0.44 UIU/ML (ref 0.3–4.2)

## 2023-02-20 PROCEDURE — 84443 ASSAY THYROID STIM HORMONE: CPT | Mod: ZL

## 2023-02-20 PROCEDURE — 36415 COLL VENOUS BLD VENIPUNCTURE: CPT | Mod: ZL

## 2023-05-16 ENCOUNTER — OFFICE VISIT (OUTPATIENT)
Dept: OTOLARYNGOLOGY | Facility: OTHER | Age: 68
End: 2023-05-16
Attending: PHYSICIAN ASSISTANT
Payer: COMMERCIAL

## 2023-05-16 VITALS
SYSTOLIC BLOOD PRESSURE: 130 MMHG | OXYGEN SATURATION: 99 % | DIASTOLIC BLOOD PRESSURE: 70 MMHG | HEART RATE: 59 BPM | TEMPERATURE: 97.2 F | BODY MASS INDEX: 31.24 KG/M2 | WEIGHT: 183 LBS | HEIGHT: 64 IN

## 2023-05-16 DIAGNOSIS — J30.2 SEASONAL ALLERGIC RHINITIS, UNSPECIFIED TRIGGER: ICD-10-CM

## 2023-05-16 DIAGNOSIS — J30.89 PERENNIAL ALLERGIC RHINITIS: Primary | ICD-10-CM

## 2023-05-16 DIAGNOSIS — E03.9 HYPOTHYROIDISM, UNSPECIFIED TYPE: ICD-10-CM

## 2023-05-16 DIAGNOSIS — R42 DIZZINESS: ICD-10-CM

## 2023-05-16 DIAGNOSIS — R55 PRE-SYNCOPE: ICD-10-CM

## 2023-05-16 LAB
ALBUMIN SERPL BCG-MCNC: 4.3 G/DL (ref 3.5–5.2)
ALP SERPL-CCNC: 74 U/L (ref 35–104)
ALT SERPL W P-5'-P-CCNC: 21 U/L (ref 10–35)
ANION GAP SERPL CALCULATED.3IONS-SCNC: 7 MMOL/L (ref 7–15)
AST SERPL W P-5'-P-CCNC: 21 U/L (ref 10–35)
BILIRUB SERPL-MCNC: 0.5 MG/DL
BUN SERPL-MCNC: 23.5 MG/DL (ref 8–23)
CALCIUM SERPL-MCNC: 8.9 MG/DL (ref 8.8–10.2)
CHLORIDE SERPL-SCNC: 104 MMOL/L (ref 98–107)
CREAT SERPL-MCNC: 0.62 MG/DL (ref 0.51–0.95)
DEPRECATED HCO3 PLAS-SCNC: 29 MMOL/L (ref 22–29)
ERYTHROCYTE [DISTWIDTH] IN BLOOD BY AUTOMATED COUNT: 15.8 % (ref 10–15)
GFR SERPL CREATININE-BSD FRML MDRD: >90 ML/MIN/1.73M2
GLUCOSE SERPL-MCNC: 85 MG/DL (ref 70–99)
HCT VFR BLD AUTO: 45.2 % (ref 35–47)
HGB BLD-MCNC: 15 G/DL (ref 11.7–15.7)
MCH RBC QN AUTO: 30 PG (ref 26.5–33)
MCHC RBC AUTO-ENTMCNC: 33.2 G/DL (ref 31.5–36.5)
MCV RBC AUTO: 90 FL (ref 78–100)
PLATELET # BLD AUTO: 206 10E3/UL (ref 150–450)
POTASSIUM SERPL-SCNC: 3.9 MMOL/L (ref 3.4–5.3)
PROT SERPL-MCNC: 7 G/DL (ref 6.4–8.3)
RBC # BLD AUTO: 5 10E6/UL (ref 3.8–5.2)
SODIUM SERPL-SCNC: 140 MMOL/L (ref 136–145)
T4 FREE SERPL-MCNC: 1.64 NG/DL (ref 0.9–1.7)
TSH SERPL DL<=0.005 MIU/L-ACNC: 0.24 UIU/ML (ref 0.3–4.2)
WBC # BLD AUTO: 6.9 10E3/UL (ref 4–11)

## 2023-05-16 PROCEDURE — 93005 ELECTROCARDIOGRAM TRACING: CPT

## 2023-05-16 PROCEDURE — 99214 OFFICE O/P EST MOD 30 MIN: CPT | Performed by: PHYSICIAN ASSISTANT

## 2023-05-16 PROCEDURE — 36415 COLL VENOUS BLD VENIPUNCTURE: CPT | Mod: ZL | Performed by: PHYSICIAN ASSISTANT

## 2023-05-16 PROCEDURE — G0463 HOSPITAL OUTPT CLINIC VISIT: HCPCS | Mod: 25

## 2023-05-16 PROCEDURE — 84439 ASSAY OF FREE THYROXINE: CPT | Mod: ZL | Performed by: PHYSICIAN ASSISTANT

## 2023-05-16 PROCEDURE — 85027 COMPLETE CBC AUTOMATED: CPT | Mod: ZL | Performed by: PHYSICIAN ASSISTANT

## 2023-05-16 PROCEDURE — 80053 COMPREHEN METABOLIC PANEL: CPT | Mod: ZL | Performed by: PHYSICIAN ASSISTANT

## 2023-05-16 PROCEDURE — 84443 ASSAY THYROID STIM HORMONE: CPT | Mod: ZL | Performed by: PHYSICIAN ASSISTANT

## 2023-05-16 PROCEDURE — 93010 ELECTROCARDIOGRAM REPORT: CPT | Mod: 76 | Performed by: INTERNAL MEDICINE

## 2023-05-16 RX ORDER — BUDESONIDE 0.5 MG/2ML
INHALANT ORAL
Qty: 360 ML | Refills: 4 | Status: SHIPPED | OUTPATIENT
Start: 2023-05-16 | End: 2023-06-13

## 2023-05-16 ASSESSMENT — PAIN SCALES - GENERAL: PAINLEVEL: MODERATE PAIN (4)

## 2023-05-16 NOTE — PROGRESS NOTES
Chief Complaint   Patient presents with     Allergies     Allergies     Patient returns to ENT for follow up regarding allergies. She was last seen on 2/3/22 for epistaxis. At that time, her allergies were fairly controlled.     Today, she has presents for seasonal allergy flare. She had increase in facial pressure/ fullness since the warming weather.  She has felt some intermittent sinus pressure over frontal and ethmoid regions which generally has resolved with budesonide rinses.  She has recently restarted use.  Laura states that she was at her daughter's house a few weeks ago and was in her basement and experienced a flushing presyncopal episode.  Patient states that in her daughter's basement was cool she felt chilled and placed a fleece on and proceeded to feel warmth and then took a few sips of wine which prompted his sensation of lightheadedness dizzy feeling and daughter noted flushing or paleness of her skin.  She reports that she is able to put the wind down and she was not lose consciousness and reported that she felt dizzy.  Patient states that she was able to ambulate to the bedroom and lay down and following was able to ambulate to the bathroom independently and felt improvement and did not have vertigo at that time or her dizziness.  When she laid down initial pressure frontal sinus which she believes could be contributed to allergy flare.  No further sinus pain.   Hx of headaches.   No associated headache or photosensitivity during these episodes.   Denied chest pain or palpitations.  Denies dyspnea    Denies COM or otologic surgeries.   Denies otorrhea  Denies worrisome tinnitus  Denies fluctuating hearing loss or tinnitus.   Denies facial paraesthesia.   One episode of dizziness/ vasovagal response.     Hx of nasal septoplasty.  Hx of SCIT.   No blood thinners.         MQT- 10/3/19  Dilution #6-None  Dilution #5- Oak, Alternia, helmintherosoirum, dust.   Dilution #2- weeds, grass, trees, mold,  "cat, dog       Past Medical History:   Diagnosis Date     Bacteremia      12/04,Negative coag testing     Calculus of kidney     age 19 and age 50     Chronic sinusitis     5/20/2011     Deviated nasal septum     8/3/2012     Deviated nasal septum     sever turbinate hypertophy and superior septal deviation to right with recurrent sinusitis     Gastro-esophageal reflux disease without esophagitis     8/31/2009     Personal history of other medical treatment (CODE)     rectocele with cervical prolapse     Phlebitis and thrombophlebitis     1/6/11, after varicose vein ablation     Phlebitis and thrombophlebitis of lower extremities, unspecified (CODE)     1/6/2011,Greater saphenous vein left lower extremity     Pneumonia     7/27/2012        Allergies   Allergen Reactions     Azithromycin Hives     Zithromax       Current Outpatient Medications   Medication     albuterol (PROAIR HFA/PROVENTIL HFA/VENTOLIN HFA) 108 (90 Base) MCG/ACT inhaler     budesonide (PULMICORT) 0.5 MG/2ML neb solution     cetirizine (ZYRTEC) 10 MG tablet     Cholecalciferol (VITAMIN D PO)     fluticasone (FLONASE) 50 MCG/ACT nasal spray     levothyroxine (SYNTHROID/LEVOTHROID) 100 MCG tablet     levothyroxine (SYNTHROID/LEVOTHROID) 112 MCG tablet     lisinopril (ZESTRIL) 20 MG tablet     metroNIDAZOLE (METROGEL) 0.75 % external gel     Multiple Vitamin (MULTIVITAMIN) per tablet     rosuvastatin (CRESTOR) 5 MG tablet     No current facility-administered medications for this visit.     ROS- SEE HPI  /70 (BP Location: Right arm, Cuff Size: Adult Regular)   Pulse 59   Temp 97.2  F (36.2  C) (Tympanic)   Ht 1.626 m (5' 4\")   Wt 83 kg (183 lb)   LMP  (LMP Unknown)   SpO2 99%   BMI 31.41 kg/m      General - The patient is well nourished and well developed, and appears to have good nutritional status.  Alert and oriented to person and place, answers questions and cooperates with examination appropriately.   Head and Face - Normocephalic and " atraumatic, with no gross asymmetry noted.  The facial nerve is intact, with strong symmetric movements.  Voice and Breathing - The patient was breathing comfortably without the use of accessory muscles. There was no wheezing, stridor, or stertor.  The patients voice was clear and strong, and had appropriate pitch and quality.  Ears -The external auditory canals are patent, the tympanic membranes are intact without effusion, retraction or mass.  Bony landmarks are intact.  Eyes - Extraocular movements intact, and the pupils were reactive to light.  Sclera were not icteric or injected, conjunctiva were pink and moist.  Mouth - Examination of the oral cavity showed pink, healthy oral mucosa. No lesions or ulcerations noted.  The tongue was mobile and midline, and the dentition were in good condition.    Crowded posterior pharynx.  Throat - The walls of the oropharynx were smooth, pink, moist, symmetric, and had no lesions or ulcerations.  The tonsillar pillars and soft palate were symmetric.  The uvula was midline on elevation.    Neck - Normal midline excursion of the laryngotracheal complex during swallowing.  Full range of motion on passive movement.  Palpation of the occipital, submental, submandibular, internal jugular chain, and supraclavicular nodes did not demonstrate any abnormal lymph nodes or masses.  Palpation of the thyroid was soft and smooth, with no nodules or goiter appreciated.  The trachea was mobile and midline.   NEURO:  equal  strength, neg Romberg, DTR II/IV bilaterally (UE and LE), finger to nose normal, CN intact, ambulates without difficulty.  no focal deficits noted.  Spike-Hallpike negative bilaterally  Heart-regular rate and rhythm.  Lungs-clear anterior posterior.  No wheeze rales       ASSESSMENT/ PLAN:    ICD-10-CM    1. Perennial allergic rhinitis  J30.89 budesonide (PULMICORT) 0.5 MG/2ML neb solution      2. Seasonal allergic rhinitis, unspecified trigger  J30.2 budesonide  (PULMICORT) 0.5 MG/2ML neb solution      3. Pre-syncope  R55 CBC with platelets     Comprehensive metabolic panel (BMP + Alb, Alk Phos, ALT, AST, Total. Bili, TP)     TSH with free T4 reflex     EKG 12-lead complete w/read - (Clinic Performed)     CBC with platelets     Comprehensive metabolic panel (BMP + Alb, Alk Phos, ALT, AST, Total. Bili, TP)     TSH with free T4 reflex     T4 free      4. Dizziness  R42 CBC with platelets     Comprehensive metabolic panel (BMP + Alb, Alk Phos, ALT, AST, Total. Bili, TP)     TSH with free T4 reflex     EKG 12-lead complete w/read - (Clinic Performed)     CBC with platelets     Comprehensive metabolic panel (BMP + Alb, Alk Phos, ALT, AST, Total. Bili, TP)     TSH with free T4 reflex     T4 free      5. Hypothyroidism, unspecified type  E03.9 TSH with free T4 reflex     TSH with free T4 reflex     T4 free            At this time Laura states that her allergies have been fairly controlled we refilled her budesonide rinse and recommended to rinse 1-2 times daily as needed.  Continue with Flonase and Zyrtec as these have been controlling her symptoms.    Patient describes an isolated event which is characteristic of presyncopal/ vasovagal.  I do not appreciate a vestibular source at this time upon her description.   Recommended to complete labs to check hemoglobin electrolytes and complete EKG.  She was further recommended to follow-up with her primary provider for further exam.      Lorelie Guzman PA-C  ENT  [facility], [place]

## 2023-05-16 NOTE — LETTER
5/16/2023         RE: Laura Romero  [address]  [place] [address]        Dear Colleague,    Thank you for referring your patient, Laura Romero, to the [facility]. Please see a copy of my visit note below.    Chief Complaint   Patient presents with     Allergies     Allergies     Patient returns to ENT for follow up regarding allergies. She was last seen on 2/3/22 for epistaxis. At that time, her allergies were fairly controlled.     Today, she has presents for seasonal allergy flare. She had increase in facial pressure/ fullness since the warming weather.  She has felt some intermittent sinus pressure over frontal and ethmoid regions which generally has resolved with budesonide rinses.  She has recently restarted use.  Laura states that she was at her daughter's house a few weeks ago and was in her basement and experienced a flushing presyncopal episode.  Patient states that in her daughter's basement was cool she felt chilled and placed a fleece on and proceeded to feel warmth and then took a few sips of wine which prompted his sensation of lightheadedness dizzy feeling and daughter noted flushing or paleness of her skin.  She reports that she is able to put the wind down and she was not lose consciousness and reported that she felt dizzy.  Patient states that she was able to ambulate to the bedroom and lay down and following was able to ambulate to the bathroom independently and felt improvement and did not have vertigo at that time or her dizziness.  When she laid down initial pressure frontal sinus which she believes could be contributed to allergy flare.  No further sinus pain.   Hx of headaches.   No associated headache or photosensitivity during these episodes.   Denied chest pain or palpitations.  Denies dyspnea    Denies COM or otologic surgeries.   Denies otorrhea  Denies worrisome tinnitus  Denies fluctuating hearing loss or tinnitus.   Denies facial  "paraesthesia.   One episode of dizziness/ vasovagal response.     Hx of nasal septoplasty.  Hx of SCIT.   No blood thinners.         MQT- 10/3/19  Dilution #6-None  Dilution #5- Oak, Alternia, helmintherosoirum, dust.   Dilution #2- weeds, grass, trees, mold, cat, dog       Past Medical History:   Diagnosis Date     Bacteremia      12/04,Negative coag testing     Calculus of kidney     age 19 and age 50     Chronic sinusitis     5/20/2011     Deviated nasal septum     8/3/2012     Deviated nasal septum     sever turbinate hypertophy and superior septal deviation to right with recurrent sinusitis     Gastro-esophageal reflux disease without esophagitis     8/31/2009     Personal history of other medical treatment (CODE)     rectocele with cervical prolapse     Phlebitis and thrombophlebitis     1/6/11, after varicose vein ablation     Phlebitis and thrombophlebitis of lower extremities, unspecified (CODE)     1/6/2011,Greater saphenous vein left lower extremity     Pneumonia     7/27/2012        Allergies   Allergen Reactions     Azithromycin Hives     Zithromax       Current Outpatient Medications   Medication     albuterol (PROAIR HFA/PROVENTIL HFA/VENTOLIN HFA) 108 (90 Base) MCG/ACT inhaler     budesonide (PULMICORT) 0.5 MG/2ML neb solution     cetirizine (ZYRTEC) 10 MG tablet     Cholecalciferol (VITAMIN D PO)     fluticasone (FLONASE) 50 MCG/ACT nasal spray     levothyroxine (SYNTHROID/LEVOTHROID) 100 MCG tablet     levothyroxine (SYNTHROID/LEVOTHROID) 112 MCG tablet     lisinopril (ZESTRIL) 20 MG tablet     metroNIDAZOLE (METROGEL) 0.75 % external gel     Multiple Vitamin (MULTIVITAMIN) per tablet     rosuvastatin (CRESTOR) 5 MG tablet     No current facility-administered medications for this visit.     ROS- SEE HPI  /70 (BP Location: Right arm, Cuff Size: Adult Regular)   Pulse 59   Temp 97.2  F (36.2  C) (Tympanic)   Ht 1.626 m (5' 4\")   Wt 83 kg (183 lb)   LMP  (LMP Unknown)   SpO2 99%   BMI " 31.41 kg/m      General - The patient is well nourished and well developed, and appears to have good nutritional status.  Alert and oriented to person and place, answers questions and cooperates with examination appropriately.   Head and Face - Normocephalic and atraumatic, with no gross asymmetry noted.  The facial nerve is intact, with strong symmetric movements.  Voice and Breathing - The patient was breathing comfortably without the use of accessory muscles. There was no wheezing, stridor, or stertor.  The patients voice was clear and strong, and had appropriate pitch and quality.  Ears -The external auditory canals are patent, the tympanic membranes are intact without effusion, retraction or mass.  Bony landmarks are intact.  Eyes - Extraocular movements intact, and the pupils were reactive to light.  Sclera were not icteric or injected, conjunctiva were pink and moist.  Mouth - Examination of the oral cavity showed pink, healthy oral mucosa. No lesions or ulcerations noted.  The tongue was mobile and midline, and the dentition were in good condition.    Crowded posterior pharynx.  Throat - The walls of the oropharynx were smooth, pink, moist, symmetric, and had no lesions or ulcerations.  The tonsillar pillars and soft palate were symmetric.  The uvula was midline on elevation.    Neck - Normal midline excursion of the laryngotracheal complex during swallowing.  Full range of motion on passive movement.  Palpation of the occipital, submental, submandibular, internal jugular chain, and supraclavicular nodes did not demonstrate any abnormal lymph nodes or masses.  Palpation of the thyroid was soft and smooth, with no nodules or goiter appreciated.  The trachea was mobile and midline.   NEURO:  equal  strength, neg Romberg, DTR II/IV bilaterally (UE and LE), finger to nose normal, CN intact, ambulates without difficulty.  no focal deficits noted.  [place]-Hallpike negative bilaterally  Heart-regular rate and  rhythm.  Lungs-clear anterior posterior.  No wheeze rales       ASSESSMENT/ PLAN:    ICD-10-CM    1. Perennial allergic rhinitis  J30.89 budesonide (PULMICORT) 0.5 MG/2ML neb solution      2. Seasonal allergic rhinitis, unspecified trigger  J30.2 budesonide (PULMICORT) 0.5 MG/2ML neb solution      3. Pre-syncope  R55 CBC with platelets     Comprehensive metabolic panel (BMP + Alb, Alk Phos, ALT, AST, Total. Bili, TP)     TSH with free T4 reflex     EKG 12-lead complete w/read - (Clinic Performed)     CBC with platelets     Comprehensive metabolic panel (BMP + Alb, Alk Phos, ALT, AST, Total. Bili, TP)     TSH with free T4 reflex     T4 free      4. Dizziness  R42 CBC with platelets     Comprehensive metabolic panel (BMP + Alb, Alk Phos, ALT, AST, Total. Bili, TP)     TSH with free T4 reflex     EKG 12-lead complete w/read - (Clinic Performed)     CBC with platelets     Comprehensive metabolic panel (BMP + Alb, Alk Phos, ALT, AST, Total. Bili, TP)     TSH with free T4 reflex     T4 free      5. Hypothyroidism, unspecified type  E03.9 TSH with free T4 reflex     TSH with free T4 reflex     T4 free            At this time Laura states that her allergies have been fairly controlled we refilled her budesonide rinse and recommended to rinse 1-2 times daily as needed.  Continue with Flonase and Zyrtec as these have been controlling her symptoms.    Patient describes an isolated event which is characteristic of presyncopal/ vasovagal.  I do not appreciate a vestibular source at this time upon her description.   Recommended to complete labs to check hemoglobin electrolytes and complete EKG.  She was further recommended to follow-up with her primary provider for further exam.      Lorelei Guzman PA-C  ENT  [facility], [place]          Again, thank you for allowing me to participate in the care of your patient.        Sincerely,        Lorelei Guzman PA-C

## 2023-05-16 NOTE — PATIENT INSTRUCTIONS
Complete labs & EKG today  Follow up with Primary physician for further evaluation  Restart Budesonide rinses.   Rinse 1-2 times daily.   Continue with Flonase, Zyrtec.     Thank you for allowing Lorelei Guzman PA-C and our ENT team to participate in your care.  If your medications are too expensive, please give the nurse a call.  We can possibly change this medication.  If you have a scheduling or an appointment question please contact our Health Unit Coordinator at [phone], Ext. [phone].    ALL nursing questions or concerns can be directed to your ENT nurse at: [phone] [facility]      Budesonide nasal saline irrigation per instructions:  -Obtain [facility] Med Sinus rinse over the counter.    -Use warm distilled water and 2 packets of the salt solution that comes with the bottle, dissolve in bottle up to the 240 mL renata.  -Add 1 vial of budesonide.  -Irrigate each side of your nose leaning over the sink, using 1/3 to 1/2 the volume of the bottle in each nostril every irrigation.  Irrigate 2 times daily.  -If additional rinses are needed/recommended, you may use the plan [facility] [facility] Sinus irrigation without the use of added budesonide

## 2023-05-24 ENCOUNTER — MYC MEDICAL ADVICE (OUTPATIENT)
Dept: FAMILY MEDICINE | Facility: OTHER | Age: 68
End: 2023-05-24
Payer: COMMERCIAL

## 2023-05-25 NOTE — TELEPHONE ENCOUNTER
Called patient to offer same-day spot on 6/1 per Dr. Mcnally's request.  Voicemail left.      Also sent [facility] message.      Clau Pierre RN on 5/25/2023 at 4:36 PM    Patient called back.  She was happy to take 10:40 on 6/1 as offered by Dr. Mcnally.  Scheduled by [facility].    My Chart messaged patient to confirm per our phone conversation.     Clau Pierre RN on 5/25/2023 at 4:45 PM

## 2023-05-26 ASSESSMENT — ASTHMA QUESTIONNAIRES
ACT_TOTALSCORE: 25
QUESTION_1 LAST FOUR WEEKS HOW MUCH OF THE TIME DID YOUR ASTHMA KEEP YOU FROM GETTING AS MUCH DONE AT WORK, SCHOOL OR AT HOME: NONE OF THE TIME
QUESTION_3 LAST FOUR WEEKS HOW OFTEN DID YOUR ASTHMA SYMPTOMS (WHEEZING, COUGHING, SHORTNESS OF BREATH, CHEST TIGHTNESS OR PAIN) WAKE YOU UP AT NIGHT OR EARLIER THAN USUAL IN THE MORNING: NOT AT ALL
QUESTION_5 LAST FOUR WEEKS HOW WOULD YOU RATE YOUR ASTHMA CONTROL: COMPLETELY CONTROLLED
QUESTION_2 LAST FOUR WEEKS HOW OFTEN HAVE YOU HAD SHORTNESS OF BREATH: NOT AT ALL
QUESTION_4 LAST FOUR WEEKS HOW OFTEN HAVE YOU USED YOUR RESCUE INHALER OR NEBULIZER MEDICATION (SUCH AS ALBUTEROL): NOT AT ALL
ACT_TOTALSCORE: 25

## 2023-06-01 ENCOUNTER — OFFICE VISIT (OUTPATIENT)
Dept: FAMILY MEDICINE | Facility: OTHER | Age: 68
End: 2023-06-01
Attending: FAMILY MEDICINE
Payer: COMMERCIAL

## 2023-06-01 VITALS
HEIGHT: 64 IN | HEART RATE: 64 BPM | WEIGHT: 182.6 LBS | SYSTOLIC BLOOD PRESSURE: 132 MMHG | OXYGEN SATURATION: 98 % | DIASTOLIC BLOOD PRESSURE: 70 MMHG | RESPIRATION RATE: 16 BRPM | TEMPERATURE: 98.4 F | BODY MASS INDEX: 31.18 KG/M2

## 2023-06-01 DIAGNOSIS — E03.9 HYPOTHYROIDISM, UNSPECIFIED TYPE: Primary | ICD-10-CM

## 2023-06-01 DIAGNOSIS — R51.9 NONINTRACTABLE HEADACHE, UNSPECIFIED CHRONICITY PATTERN, UNSPECIFIED HEADACHE TYPE: ICD-10-CM

## 2023-06-01 DIAGNOSIS — I44.7 NEW ONSET LEFT BUNDLE BRANCH BLOCK (LBBB): ICD-10-CM

## 2023-06-01 LAB
HOLD SPECIMEN: NORMAL
T3FREE SERPL-MCNC: 2.4 PG/ML (ref 2–4.4)
T4 FREE SERPL-MCNC: 1.6 NG/DL (ref 0.9–1.7)
TSH SERPL DL<=0.005 MIU/L-ACNC: 0.16 UIU/ML (ref 0.3–4.2)

## 2023-06-01 PROCEDURE — 84481 FREE ASSAY (FT-3): CPT | Mod: ZL | Performed by: FAMILY MEDICINE

## 2023-06-01 PROCEDURE — 86376 MICROSOMAL ANTIBODY EACH: CPT | Mod: ZL | Performed by: FAMILY MEDICINE

## 2023-06-01 PROCEDURE — 84443 ASSAY THYROID STIM HORMONE: CPT | Mod: ZL | Performed by: FAMILY MEDICINE

## 2023-06-01 PROCEDURE — 84439 ASSAY OF FREE THYROXINE: CPT | Mod: ZL | Performed by: FAMILY MEDICINE

## 2023-06-01 PROCEDURE — 36415 COLL VENOUS BLD VENIPUNCTURE: CPT | Mod: ZL | Performed by: FAMILY MEDICINE

## 2023-06-01 PROCEDURE — G0463 HOSPITAL OUTPT CLINIC VISIT: HCPCS

## 2023-06-01 PROCEDURE — 99214 OFFICE O/P EST MOD 30 MIN: CPT | Performed by: FAMILY MEDICINE

## 2023-06-01 PROCEDURE — 83520 IMMUNOASSAY QUANT NOS NONAB: CPT | Mod: ZL | Performed by: FAMILY MEDICINE

## 2023-06-01 RX ORDER — LEVOTHYROXINE SODIUM 112 UG/1
TABLET ORAL
Qty: 45 TABLET | Refills: 3 | Status: SHIPPED | OUTPATIENT
Start: 2023-06-01 | End: 2023-08-15

## 2023-06-01 RX ORDER — LEVOTHYROXINE SODIUM 100 UG/1
TABLET ORAL
Qty: 45 TABLET | Refills: 3 | Status: SHIPPED | OUTPATIENT
Start: 2023-06-01 | End: 2023-07-11

## 2023-06-01 ASSESSMENT — PAIN SCALES - GENERAL: PAINLEVEL: NO PAIN (0)

## 2023-06-01 NOTE — PROGRESS NOTES
Nursing Notes:   Henna Kendall LPN  6/1/2023 10:45 AM  Sign at exiting of workspace  Chief Complaint   Patient presents with     RECHECK       Medication Reconciliation: complete    Henna Kendall LPN      Romain Sanchez is a 68 year old, presenting for the following health issues:  RECHECK        6/1/2023    10:42 AM   Additional Questions   Roomed by Henna Kendall   Accompanied by self     Laura is here today to discuss a few things.  She had her TSH checked recently when she was in to see ENT and it had been decreased.  She had been working on weight loss over the last few months.  She is now at the point where she is maintaining her weight loss.  Had never had significant fluctuations in her TSH previously and now has had a lot of fluctuations since on Optiva diet.  This diet plan does rely on soy as a large part of its protein source.  She usually tries to avoid soy, but there is a lot in some of her supplements.  Her blood pressure is also a little higher than it had been.  She had an episode recently where she got extremely dizzy.  She had only drank a couple of sips of wine.  She may have slurred her speech a little and almost passed out but did not.  As soon as she laid down, she had a severe sinus headache.  This did not seem like episodes of vertigo she has had in the past.  This happened in the evening and she had eaten a normal dinner.  The next day she felt fine.  She now has a tender spot on her right upper forehead.  Tender to touch like she bruised it, but she didn't.  Has been there for a few weeks.  With her allergies, she often gets sinus pressure and headache, but this episode felt different.  When she had been to ENT, she also had an EKG, which showed a partial left bundle branch block, which had never previously been seen on her EKGs.    History of Present Illness       Reason for visit:  TSH is low / had dizziness episode awhile ago  Symptom onset:  3-4 weeks ago  Symptoms include:   "No symptoms now  Symptom intensity:  Mild  Had these symptoms before:  Nicki consumes 0 sweetened beverage(s) daily. She exercises with enough effort to increase her heart rate 3 or less days per week.   She is taking medications regularly.       Allergies         Review of Systems   Constitutional, HEENT, cardiovascular, pulmonary, GI, , musculoskeletal, neuro, skin, endocrine and psych systems are negative, except as otherwise noted.      Objective    /70   Pulse 64   Temp 98.4  F (36.9  C) (Tympanic)   Resp 16   Ht 1.613 m (5' 3.5\")   Wt 82.8 kg (182 lb 9.6 oz)   LMP  (LMP Unknown)   SpO2 98%   Breastfeeding No   BMI 31.84 kg/m    Body mass index is 31.84 kg/m .  Physical Exam  Constitutional:       Appearance: Normal appearance.   HENT:      Head: Normocephalic.      Right Ear: Tympanic membrane normal.      Left Ear: Tympanic membrane normal.   Eyes:      Extraocular Movements: Extraocular movements intact.      Pupils: Pupils are equal, round, and reactive to light.   Cardiovascular:      Rate and Rhythm: Normal rate and regular rhythm.      Heart sounds: Normal heart sounds. No murmur heard.  Pulmonary:      Effort: Pulmonary effort is normal.      Breath sounds: Normal breath sounds. No wheezing, rhonchi or rales.   Musculoskeletal:      Cervical back: Normal range of motion and neck supple.      Right lower leg: No edema.      Left lower leg: No edema.   Lymphadenopathy:      Cervical: No cervical adenopathy.   Neurological:      General: No focal deficit present.      Mental Status: She is alert and oriented to person, place, and time.      Cranial Nerves: No cranial nerve deficit.      Motor: No weakness.      Gait: Gait normal.   Psychiatric:         Mood and Affect: Mood normal.         Behavior: Behavior normal.          Assessment & Plan     ICD-10-CM    1. Hypothyroidism, unspecified type  E03.9 Thyroid peroxidase antibody     Thyrotropin Receptor Antibody     TSH     T4, Free     " T3, Free     levothyroxine (SYNTHROID/LEVOTHROID) 100 MCG tablet     levothyroxine (SYNTHROID/LEVOTHROID) 112 MCG tablet     Thyroid peroxidase antibody     Thyrotropin Receptor Antibody     TSH     T4, Free     T3, Free      2. Nonintractable headache, unspecified chronicity pattern, unspecified headache type  R51.9 MR Brain w/o & w Contrast     CANCELED: MR Brain w/o Contrast      3. New onset left bundle branch block (LBBB)  I44.7 Internal Medicine Referral        1.  When she had been on 110 mcg of levothyroxine daily, she had been in the hypothyroid range.  112 mcg daily had put her in the hyperthyroid range.  She currently has been alternating 110 and 112 mcg daily every other day.  She is again in the hyperthyroid range.  We will have her take 110 mcg 2 days and 112 mcg 1 day, alternating this pattern.  Labs were completed today as above as well as antibodies.  Discussed that if she has positive antibody testing, would recommend referral to endocrinology for further evaluation and treatment recommendations.  Otherwise, would recommend repeat TSH in 4 to 6 weeks to ensure that this dose is adequate.  2.  With the unusual spells she had recently, we will obtain an MRI of her brain.  3.  This is new for her.  She is referred to internal medicine to discuss possible stress testing.  She does have a strong family history of coronary artery disease.         Encouraged regular exercise.     Return for labs in 4-6 weeks..    Gloria Mcnally MD  [facility]

## 2023-06-01 NOTE — NURSING NOTE
Chief Complaint   Patient presents with     RECHECK       Medication Reconciliation: complete    Henna Kendall, LPN

## 2023-06-02 LAB — THYROPEROXIDASE AB SERPL-ACNC: 44 IU/ML

## 2023-06-03 LAB — TSH RECEP AB SER-ACNC: <1.1 IU/L (ref 0–1.75)

## 2023-06-05 ENCOUNTER — MYC MEDICAL ADVICE (OUTPATIENT)
Dept: FAMILY MEDICINE | Facility: OTHER | Age: 68
End: 2023-06-05
Payer: COMMERCIAL

## 2023-06-05 DIAGNOSIS — E06.3 HASHIMOTO'S THYROIDITIS: ICD-10-CM

## 2023-06-05 DIAGNOSIS — E03.9 HYPOTHYROIDISM, UNSPECIFIED TYPE: Primary | ICD-10-CM

## 2023-06-06 DIAGNOSIS — M79.671 RIGHT FOOT PAIN: Primary | ICD-10-CM

## 2023-06-06 NOTE — TELEPHONE ENCOUNTER
Endocrinology referral placed.  Please check the status on 6/1/23 the Internal Medicine referral that was placed on and have them call her to set up.  Gloria Mcnally MD

## 2023-06-08 ENCOUNTER — HOSPITAL ENCOUNTER (OUTPATIENT)
Dept: GENERAL RADIOLOGY | Facility: OTHER | Age: 68
Discharge: HOME | End: 2023-06-08
Attending: PODIATRIST
Payer: MEDICARE

## 2023-06-08 ENCOUNTER — OFFICE VISIT (OUTPATIENT)
Dept: ORTHOPEDICS | Facility: OTHER | Age: 68
End: 2023-06-08
Attending: PODIATRIST
Payer: MEDICARE

## 2023-06-08 VITALS
OXYGEN SATURATION: 100 % | SYSTOLIC BLOOD PRESSURE: 124 MMHG | HEIGHT: 64 IN | RESPIRATION RATE: 16 BRPM | WEIGHT: 182 LBS | BODY MASS INDEX: 31.07 KG/M2 | HEART RATE: 67 BPM | DIASTOLIC BLOOD PRESSURE: 80 MMHG

## 2023-06-08 DIAGNOSIS — M79.671 RIGHT FOOT PAIN: Primary | ICD-10-CM

## 2023-06-08 DIAGNOSIS — M79.671 RIGHT FOOT PAIN: ICD-10-CM

## 2023-06-08 PROCEDURE — 250N000009 HC RX 250: Performed by: PODIATRIST

## 2023-06-08 PROCEDURE — 20600 DRAIN/INJ JOINT/BURSA W/O US: CPT | Performed by: PODIATRIST

## 2023-06-08 PROCEDURE — 20605 DRAIN/INJ JOINT/BURSA W/O US: CPT | Mod: RT | Performed by: PODIATRIST

## 2023-06-08 PROCEDURE — 250N000011 HC RX IP 250 OP 636: Performed by: PODIATRIST

## 2023-06-08 PROCEDURE — 73630 X-RAY EXAM OF FOOT: CPT | Mod: RT

## 2023-06-08 PROCEDURE — 99203 OFFICE O/P NEW LOW 30 MIN: CPT | Mod: 25 | Performed by: PODIATRIST

## 2023-06-08 PROCEDURE — G0463 HOSPITAL OUTPT CLINIC VISIT: HCPCS | Mod: 25

## 2023-06-08 RX ORDER — TRIAMCINOLONE ACETONIDE 40 MG/ML
20 INJECTION, SUSPENSION INTRA-ARTICULAR; INTRAMUSCULAR ONCE
Status: COMPLETED | OUTPATIENT
Start: 2023-06-08 | End: 2023-06-08

## 2023-06-08 RX ORDER — LIDOCAINE HYDROCHLORIDE 10 MG/ML
1 INJECTION, SOLUTION EPIDURAL; INFILTRATION; INTRACAUDAL; PERINEURAL ONCE
Status: COMPLETED | OUTPATIENT
Start: 2023-06-08 | End: 2023-06-08

## 2023-06-08 RX ADMIN — TRIAMCINOLONE ACETONIDE 20 MG: 40 INJECTION, SUSPENSION INTRA-ARTICULAR; INTRAMUSCULAR at 11:11

## 2023-06-08 RX ADMIN — LIDOCAINE HYDROCHLORIDE 1 ML: 10 INJECTION, SOLUTION EPIDURAL; INFILTRATION; INTRACAUDAL; PERINEURAL at 11:11

## 2023-06-08 ASSESSMENT — PAIN SCALES - GENERAL: PAINLEVEL: MILD PAIN (2)

## 2023-06-08 NOTE — PROGRESS NOTES
SUBJECTIVE:  Laura is a 68-year-old new patient see me regarding a right foot bunion and ankle arthritis.  She has had 1 injection in the ankle in the fall it did help significantly.  She has discussed surgery for the bunion and the ankle with another provider and would like another opinion.  She is here specifically for this today.     ROS: Musculoskeletal and general review of systems are negative, per review of previous clinic questionnaire.  Denies SOB and calf pain.    EXAM:   PHYSICAL EXAMINATION:   CONSTITUTIONAL:  The patient is alert and oriented x 3, well appearing and in no apparent distress.  Affect is pleasant and answers questions appropriately.  VASCULAR:  Circulation is intact with palpable pedal pulses and adequate capillary fill time to all digits.  Hair growth is present and appropriate to mid foot and digits. Calf nontender.  NEUROLOGIC:  Light touch sensation is intact to digits.  There is a negative Tinel sign.    INTEGUMENT:  No abnormal dermatologic lesions are noted.  Skin has normal texture and turgor.    MUSCULOSKELETAL:   Right foot and ankle evaluated.  She does have equinus believes a lot of this is osseous she has end-stage arthritis with crepitus within the joint symptoms diffuse around the ankle.  She has a very large significant bunion in addition to this.  She has mild tenderness the medial eminence most of her symptoms are associated with a second hammertoe and metatarsal head she has a subluxed second MPJ severely contracted second toe.  Heel and her arch are maintained in good alignment in relation to her lower leg.  May have some slight valgus of her knee.    IMAGING: 3 views of the foot taken today demonstrate advanced bunion deformity effective IM angle of over 15 degrees malposition sesamoids which.  Contracted second toe.  She has end-stage arthritic changes to her ankle slight posterior subluxation of the talus in relation to tibia.    ASSESSMENT: Advanced bunion mild  midfoot instability metatarsalgia second hammertoe.  End-stage ankle arthrosis and osseous equinus.    PLAN OF CARE: I discussed condition and treatment patient today.  We discussed conservative cares.  In regards to the ankle I believe her pathology is end-stage.  Procedure for this would consist of a definitive type procedure we discussed both an ankle fusion and an ankle replacement.  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  She does well with an injection has only done 1.  We discussed a repeat injection today which was done in addition to office visit and letting this ankle be until she is ready to address at this definitive fashion.  In regards to the bunion I discussed options in my hands this would consist of either a first MPJ fusion or a midfoot fusion and bunionectomy dependent on findings and extent of arthrosis intraoperatively.  I discussed this in detail she thinks she would like to do this in the fall.  We did not place a surgery order today we will wait to hear from her as she decides what she would like to do.  She can call to schedule or follow-up with more questions.  Again ankle injection performed in addition to office visit 30-minute consult.    Procedure: Patient's right ankle medial portal prepped with alcohol.  Injected at this interval into the ankle with 20 mg of Kenalog and 1 mL of 1% lidocaine plain.  Procedure tolerated well Band-Aid applied.    Sourav March DPM

## 2023-06-08 NOTE — PROGRESS NOTES
Patient is here for a consult for her right foot pain.    Apple Villegas LPN .......  6/8/2023  10:32 AM

## 2023-06-13 ENCOUNTER — TELEPHONE (OUTPATIENT)
Dept: OTOLARYNGOLOGY | Facility: OTHER | Age: 68
End: 2023-06-13

## 2023-06-13 DIAGNOSIS — J30.2 SEASONAL ALLERGIC RHINITIS, UNSPECIFIED TRIGGER: ICD-10-CM

## 2023-06-13 DIAGNOSIS — J30.89 PERENNIAL ALLERGIC RHINITIS: ICD-10-CM

## 2023-06-13 RX ORDER — BUDESONIDE 0.5 MG/2ML
INHALANT ORAL
Qty: 360 ML | Refills: 4 | Status: ON HOLD | OUTPATIENT
Start: 2023-06-13 | End: 2024-07-29

## 2023-06-13 NOTE — TELEPHONE ENCOUNTER
Patient calls and states that she still has not been able to  her Budesonide.  [facility] states now that they do not have an order any more.  Please resend to [facility] in [place].

## 2023-06-15 ENCOUNTER — HOSPITAL ENCOUNTER (OUTPATIENT)
Dept: MRI IMAGING | Facility: OTHER | Age: 68
Discharge: HOME | End: 2023-06-15
Attending: FAMILY MEDICINE | Admitting: FAMILY MEDICINE
Payer: MEDICARE

## 2023-06-15 DIAGNOSIS — R51.9 NONINTRACTABLE HEADACHE, UNSPECIFIED CHRONICITY PATTERN, UNSPECIFIED HEADACHE TYPE: ICD-10-CM

## 2023-06-15 PROCEDURE — 255N000002 HC RX 255 OP 636: Performed by: FAMILY MEDICINE

## 2023-06-15 PROCEDURE — A9575 INJ GADOTERATE MEGLUMI 0.1ML: HCPCS | Performed by: FAMILY MEDICINE

## 2023-06-15 PROCEDURE — 70553 MRI BRAIN STEM W/O & W/DYE: CPT | Mod: MG

## 2023-06-15 PROCEDURE — G1010 CDSM STANSON: HCPCS

## 2023-06-15 RX ADMIN — GADOTERATE MEGLUMINE 17 ML: 376.9 INJECTION INTRAVENOUS at 10:39

## 2023-06-16 ENCOUNTER — MYC MEDICAL ADVICE (OUTPATIENT)
Dept: FAMILY MEDICINE | Facility: OTHER | Age: 68
End: 2023-06-16
Payer: COMMERCIAL

## 2023-06-28 ENCOUNTER — OFFICE VISIT (OUTPATIENT)
Dept: INTERNAL MEDICINE | Facility: OTHER | Age: 68
End: 2023-06-28
Attending: NURSE PRACTITIONER
Payer: COMMERCIAL

## 2023-06-28 VITALS
SYSTOLIC BLOOD PRESSURE: 122 MMHG | TEMPERATURE: 97.6 F | RESPIRATION RATE: 16 BRPM | WEIGHT: 185.8 LBS | BODY MASS INDEX: 32.92 KG/M2 | OXYGEN SATURATION: 98 % | HEART RATE: 98 BPM | DIASTOLIC BLOOD PRESSURE: 80 MMHG | HEIGHT: 63 IN

## 2023-06-28 DIAGNOSIS — I44.7 NEW ONSET LEFT BUNDLE BRANCH BLOCK (LBBB): ICD-10-CM

## 2023-06-28 DIAGNOSIS — R55 PRE-SYNCOPE: Primary | ICD-10-CM

## 2023-06-28 DIAGNOSIS — E78.00 HYPERCHOLESTEROLEMIA: ICD-10-CM

## 2023-06-28 DIAGNOSIS — J45.20 MILD INTERMITTENT EXTRINSIC ASTHMA WITHOUT COMPLICATION: ICD-10-CM

## 2023-06-28 DIAGNOSIS — Z82.49 FAMILY HISTORY OF PREMATURE CORONARY ARTERY DISEASE: ICD-10-CM

## 2023-06-28 DIAGNOSIS — E03.9 HYPOTHYROIDISM, UNSPECIFIED TYPE: ICD-10-CM

## 2023-06-28 DIAGNOSIS — I10 PRIMARY HYPERTENSION: ICD-10-CM

## 2023-06-28 PROCEDURE — 93005 ELECTROCARDIOGRAM TRACING: CPT | Performed by: NURSE PRACTITIONER

## 2023-06-28 PROCEDURE — 93010 ELECTROCARDIOGRAM REPORT: CPT | Performed by: INTERNAL MEDICINE

## 2023-06-28 PROCEDURE — 99215 OFFICE O/P EST HI 40 MIN: CPT | Performed by: NURSE PRACTITIONER

## 2023-06-28 PROCEDURE — G0463 HOSPITAL OUTPT CLINIC VISIT: HCPCS

## 2023-06-28 ASSESSMENT — ENCOUNTER SYMPTOMS
LIGHT-HEADEDNESS: 1
DIAPHORESIS: 1
ABDOMINAL PAIN: 0
ARTHRALGIAS: 1
DIZZINESS: 1
WHEEZING: 0
NAUSEA: 1
SPEECH DIFFICULTY: 1
CHILLS: 1
VOMITING: 0
PARESTHESIAS: 0
FEVER: 0
SHORTNESS OF BREATH: 0
NUMBNESS: 0
CONFUSION: 1
COUGH: 0
DIARRHEA: 0
CHEST TIGHTNESS: 0

## 2023-06-28 ASSESSMENT — PAIN SCALES - GENERAL: PAINLEVEL: NO PAIN (0)

## 2023-06-28 NOTE — NURSING NOTE
"Chief Complaint   Patient presents with     Consult       FOOD SECURITY SCREENING QUESTIONS  Hunger Vital Signs:  Within the past 12 months we worried whether our food would run out before we got money to buy more. Never  Within the past 12 months the food we bought just didn't last and we didn't have money to get more. Never  Dianelys Benitez LPN 6/28/2023 10:23 AM      Initial /80 (BP Location: Right arm, Patient Position: Sitting, Cuff Size: Adult Regular)   Pulse 98   Temp 97.6  F (36.4  C) (Tympanic)   Resp 16   Ht 1.61 m (5' 3.39\")   Wt 84.3 kg (185 lb 12.8 oz)   LMP 01/01/2005   SpO2 98%   Breastfeeding No   BMI 32.51 kg/m   Estimated body mass index is 32.51 kg/m  as calculated from the following:    Height as of this encounter: 1.61 m (5' 3.39\").    Weight as of this encounter: 84.3 kg (185 lb 12.8 oz).  Medication Reconciliation: complete    Dianelys Benitez LPN  "

## 2023-07-01 LAB
ATRIAL RATE - MUSE: 61 BPM
DIASTOLIC BLOOD PRESSURE - MUSE: NORMAL MMHG
INTERPRETATION ECG - MUSE: NORMAL
P AXIS - MUSE: 39 DEGREES
PR INTERVAL - MUSE: 166 MS
QRS DURATION - MUSE: 104 MS
QT - MUSE: 436 MS
QTC - MUSE: 438 MS
R AXIS - MUSE: -18 DEGREES
SYSTOLIC BLOOD PRESSURE - MUSE: NORMAL MMHG
T AXIS - MUSE: 21 DEGREES
VENTRICULAR RATE- MUSE: 61 BPM

## 2023-07-07 ENCOUNTER — HOSPITAL ENCOUNTER (OUTPATIENT)
Dept: MAMMOGRAPHY | Facility: OTHER | Age: 68
Discharge: HOME | End: 2023-07-07
Attending: FAMILY MEDICINE
Payer: MEDICARE

## 2023-07-07 ENCOUNTER — LAB (OUTPATIENT)
Dept: LAB | Facility: OTHER | Age: 68
End: 2023-07-07
Attending: FAMILY MEDICINE
Payer: MEDICARE

## 2023-07-07 ENCOUNTER — MYC MEDICAL ADVICE (OUTPATIENT)
Dept: FAMILY MEDICINE | Facility: OTHER | Age: 68
End: 2023-07-07

## 2023-07-07 DIAGNOSIS — E03.9 HYPOTHYROIDISM, UNSPECIFIED TYPE: ICD-10-CM

## 2023-07-07 DIAGNOSIS — Z12.31 VISIT FOR SCREENING MAMMOGRAM: ICD-10-CM

## 2023-07-07 LAB
HOLD SPECIMEN: NORMAL
T4 FREE SERPL-MCNC: 1.61 NG/DL (ref 0.9–1.7)
TSH SERPL DL<=0.005 MIU/L-ACNC: 0.27 UIU/ML (ref 0.3–4.2)

## 2023-07-07 PROCEDURE — 36415 COLL VENOUS BLD VENIPUNCTURE: CPT | Mod: ZL

## 2023-07-07 PROCEDURE — 84439 ASSAY OF FREE THYROXINE: CPT | Mod: ZL

## 2023-07-07 PROCEDURE — 84443 ASSAY THYROID STIM HORMONE: CPT | Mod: ZL

## 2023-07-07 PROCEDURE — 77067 SCR MAMMO BI INCL CAD: CPT

## 2023-07-11 RX ORDER — LEVOTHYROXINE SODIUM 100 UG/1
100 TABLET ORAL DAILY
Qty: 90 TABLET | Refills: 0 | Status: SHIPPED | OUTPATIENT
Start: 2023-07-11 | End: 2024-02-27

## 2023-07-11 NOTE — TELEPHONE ENCOUNTER
"Per note on last thyroid labs, \" If it's going to be a while, I would recommend to going back to taking Levothyroxine 100 mcg daily (instead of taking the 112 every 3rd day).\"    See MyChart message below. Patient was called by endocrinology, but will not be seen until end of September. Patient requesting change in levothyroxine dose prior. Please advise pended order. Due to absence of PCP until 7/20/23, routing to a covering provider    Corinne R Thayer, RN on 7/11/2023 at 9:17 AM    "

## 2023-07-14 ENCOUNTER — TELEPHONE (OUTPATIENT)
Dept: CARDIOLOGY | Facility: OTHER | Age: 68
End: 2023-07-14
Payer: COMMERCIAL

## 2023-07-14 NOTE — TELEPHONE ENCOUNTER
DEBORAH on pts personal voice mail.  Reminder of appt on 7/18/23 at 2:00 pm.  To take all medications as usual, no food for 2 hours before.  Ok to have water.  Our direct number given for any questions.

## 2023-07-17 ENCOUNTER — TELEPHONE (OUTPATIENT)
Dept: CARDIOLOGY | Facility: OTHER | Age: 68
End: 2023-07-17
Payer: COMMERCIAL

## 2023-07-17 NOTE — TELEPHONE ENCOUNTER
Patient verified .  Reminder call for stress test with instructions given. Pt will bring her inhaler with her.  To take all medications as usual.  No food for 3 hours.  Water is ok.    Patient verbalized understanding.

## 2023-07-18 ENCOUNTER — HOSPITAL ENCOUNTER (OUTPATIENT)
Dept: CARDIOLOGY | Facility: OTHER | Age: 68
Discharge: HOME | End: 2023-07-18
Attending: NURSE PRACTITIONER
Payer: MEDICARE

## 2023-07-18 VITALS — BODY MASS INDEX: 32.78 KG/M2 | WEIGHT: 185 LBS | HEIGHT: 63 IN

## 2023-07-18 DIAGNOSIS — R55 PRE-SYNCOPE: ICD-10-CM

## 2023-07-18 PROCEDURE — 93246 EXT ECG>7D<15D RECORDING: CPT

## 2023-07-18 PROCEDURE — 93321 DOPPLER ECHO F-UP/LMTD STD: CPT | Mod: 26 | Performed by: STUDENT IN AN ORGANIZED HEALTH CARE EDUCATION/TRAINING PROGRAM

## 2023-07-18 PROCEDURE — 93350 STRESS TTE ONLY: CPT | Mod: 26 | Performed by: STUDENT IN AN ORGANIZED HEALTH CARE EDUCATION/TRAINING PROGRAM

## 2023-07-18 PROCEDURE — 255N000002 HC RX 255 OP 636: Performed by: NURSE PRACTITIONER

## 2023-07-18 PROCEDURE — 93325 DOPPLER ECHO COLOR FLOW MAPG: CPT | Mod: 26 | Performed by: STUDENT IN AN ORGANIZED HEALTH CARE EDUCATION/TRAINING PROGRAM

## 2023-07-18 PROCEDURE — 999N000208 ECHO STRESS ECHOCARDIOGRAM

## 2023-07-18 PROCEDURE — 93017 CV STRESS TEST TRACING ONLY: CPT

## 2023-07-18 PROCEDURE — 93321 DOPPLER ECHO F-UP/LMTD STD: CPT | Mod: TC

## 2023-07-18 PROCEDURE — 999N000157 HC STATISTIC RCP TIME EA 10 MIN

## 2023-07-18 PROCEDURE — 93018 CV STRESS TEST I&R ONLY: CPT | Performed by: STUDENT IN AN ORGANIZED HEALTH CARE EDUCATION/TRAINING PROGRAM

## 2023-07-18 PROCEDURE — 93248 EXT ECG>7D<15D REV&INTERPJ: CPT | Performed by: INTERNAL MEDICINE

## 2023-07-18 PROCEDURE — 93016 CV STRESS TEST SUPVJ ONLY: CPT | Performed by: STUDENT IN AN ORGANIZED HEALTH CARE EDUCATION/TRAINING PROGRAM

## 2023-07-18 RX ADMIN — PERFLUTREN 2 ML: 6.52 INJECTION, SUSPENSION INTRAVENOUS at 14:33

## 2023-07-18 NOTE — PATIENT INSTRUCTIONS
Date Placed on Pt:  07/18/2023    Patient instructed not to:   -take baths, swim, sauna   -remove device prior to 14 days   -use electric blankets   -shower or sweat excessively within first 24 hours of device application  Patient instructed to:   -shower as needed   -be carefull when toweling off and dressing   -press button when cardiac symptoms occur   -document symptoms in log book   -remove and return device (send via mail to [employer])   -Call [facility] with any questions

## 2023-07-18 NOTE — PROGRESS NOTES
1400The patient arrived for a stress echo.  The procedure, risks and benefits were discussed and the consent was signed.  The patient was prepped for the stress test, and an IV was placed per procedure.  The echo sonographer did the initial images with definity for image enhancement.   Dr. Vanegas arrived, and the patient biked 6minutes and 57seconds.  The patient tolerated the procedure.  Stress images were completed and the IV was removed per procedure.  The patient was released in stable condition.  The patient was instructed that the ordering MD will call with results in one to two days.  If you have not received your results within 3 days please call the ordering provider.  Please see the chart for complete test results.

## 2023-07-19 DIAGNOSIS — R94.39 ABNORMAL CARDIOVASCULAR STRESS TEST: Primary | ICD-10-CM

## 2023-07-21 ENCOUNTER — HOSPITAL ENCOUNTER (OUTPATIENT)
Dept: ULTRASOUND IMAGING | Facility: OTHER | Age: 68
Discharge: HOME | End: 2023-07-21
Attending: NURSE PRACTITIONER | Admitting: NURSE PRACTITIONER
Payer: MEDICARE

## 2023-07-21 DIAGNOSIS — R55 PRE-SYNCOPE: ICD-10-CM

## 2023-07-21 PROCEDURE — 93880 EXTRACRANIAL BILAT STUDY: CPT

## 2023-08-15 ENCOUNTER — OFFICE VISIT (OUTPATIENT)
Dept: CARDIOLOGY | Facility: OTHER | Age: 68
End: 2023-08-15
Attending: NURSE PRACTITIONER
Payer: COMMERCIAL

## 2023-08-15 VITALS
OXYGEN SATURATION: 98 % | SYSTOLIC BLOOD PRESSURE: 130 MMHG | HEART RATE: 67 BPM | RESPIRATION RATE: 14 BRPM | BODY MASS INDEX: 33.32 KG/M2 | WEIGHT: 195.2 LBS | DIASTOLIC BLOOD PRESSURE: 86 MMHG | HEIGHT: 64 IN | TEMPERATURE: 97.8 F

## 2023-08-15 DIAGNOSIS — E78.2 MIXED HYPERLIPIDEMIA: ICD-10-CM

## 2023-08-15 DIAGNOSIS — R07.89 CHEST HEAVINESS: ICD-10-CM

## 2023-08-15 DIAGNOSIS — R94.39 ABNORMAL CARDIOVASCULAR STRESS TEST: Primary | ICD-10-CM

## 2023-08-15 DIAGNOSIS — Z82.49 FAMILY HISTORY OF PREMATURE CAD: ICD-10-CM

## 2023-08-15 DIAGNOSIS — I10 PRIMARY HYPERTENSION: ICD-10-CM

## 2023-08-15 LAB
ATRIAL RATE - MUSE: 65 BPM
DIASTOLIC BLOOD PRESSURE - MUSE: NORMAL MMHG
INTERPRETATION ECG - MUSE: NORMAL
P AXIS - MUSE: 52 DEGREES
PR INTERVAL - MUSE: 168 MS
QRS DURATION - MUSE: 96 MS
QT - MUSE: 418 MS
QTC - MUSE: 434 MS
R AXIS - MUSE: -16 DEGREES
SYSTOLIC BLOOD PRESSURE - MUSE: NORMAL MMHG
T AXIS - MUSE: 37 DEGREES
VENTRICULAR RATE- MUSE: 65 BPM

## 2023-08-15 PROCEDURE — 99205 OFFICE O/P NEW HI 60 MIN: CPT | Performed by: NURSE PRACTITIONER

## 2023-08-15 PROCEDURE — 93010 ELECTROCARDIOGRAM REPORT: CPT | Performed by: INTERNAL MEDICINE

## 2023-08-15 PROCEDURE — G0463 HOSPITAL OUTPT CLINIC VISIT: HCPCS | Mod: 25

## 2023-08-15 PROCEDURE — 93005 ELECTROCARDIOGRAM TRACING: CPT | Performed by: NURSE PRACTITIONER

## 2023-08-15 RX ORDER — SODIUM CHLORIDE 9 MG/ML
INJECTION, SOLUTION INTRAVENOUS CONTINUOUS
Status: CANCELLED | OUTPATIENT
Start: 2023-08-15

## 2023-08-15 RX ORDER — ASPIRIN 81 MG/1
81 TABLET, CHEWABLE ORAL DAILY
COMMUNITY
Start: 2023-08-15 | End: 2023-09-18

## 2023-08-15 RX ORDER — POTASSIUM CHLORIDE 1500 MG/1
20 TABLET, EXTENDED RELEASE ORAL
Status: CANCELLED | OUTPATIENT
Start: 2023-08-15

## 2023-08-15 RX ORDER — POTASSIUM CHLORIDE 1500 MG/1
40 TABLET, EXTENDED RELEASE ORAL
Status: CANCELLED | OUTPATIENT
Start: 2023-08-15

## 2023-08-15 RX ORDER — LIDOCAINE 40 MG/G
CREAM TOPICAL
Status: CANCELLED | OUTPATIENT
Start: 2023-08-15

## 2023-08-15 ASSESSMENT — PAIN SCALES - GENERAL: PAINLEVEL: NO PAIN (0)

## 2023-08-15 NOTE — PATIENT INSTRUCTIONS
Referral for coronary angiography at [facility].  Start Aspirin 81 mg daily.   Follow-up with cardiology in 1-2 weeks after coronary angiography.       Pre-procedure instructions - Coronary Angiogram  Patient Education    Your arrival time is 11AM.  Location is [id number] [place] [address] Waiting Room  Please plan on being at the hospital all day.  At any time, emergencies and/or urgent cases may come up which could delay the start of your procedure.    Pre-procedure instructions - Coronary Angiogram  Shower in the evening before or the morning of the procedure  No solid food for 8 hours prior and nothing to drink 2 hours prior to arrival time  You can take your morning medications (except for diabetic and blood thinners) with sips of water.  Take 81mg of aspirin once daily even on the morning of your procedure.  You will need to arrange a ride to drop you off and pick you up, as you will be unable to drive home.  Prior to discharge you may be required to lay flat for approximately 2-4 hours in the recovery unit to ensure proper clotting of the artery.      You will need to follow up with one of our cardiology APPs 1-2 weeks after your procedure. If you need help scheduling or rescheduling your appointment, please call [phone]

## 2023-08-15 NOTE — NURSING NOTE
"Chief Complaint   Patient presents with    New Patient     Abnormal Stress Test        Initial /84 (BP Location: Right arm, Patient Position: Sitting, Cuff Size: Adult Regular)   Pulse 67   Temp 97.8  F (36.6  C) (Temporal)   Resp 14   Ht 1.613 m (5' 3.5\")   Wt 88.5 kg (195 lb 3.2 oz)   LMP 01/01/2005   SpO2 98%   BMI 34.04 kg/m   Estimated body mass index is 34.04 kg/m  as calculated from the following:    Height as of this encounter: 1.613 m (5' 3.5\").    Weight as of this encounter: 88.5 kg (195 lb 3.2 oz).  Meds Reconciled: complete  Pt is not on Aspirin  Pt is on a Statin  Pt is not on Xarelto or Eliquis  Pt is not on a Warfarin   PHQ and/or LEONARD reviewed. Pt referred to PCP/MH Provider as appropriate.    Yodit Degroot LPN     "

## 2023-08-15 NOTE — PROGRESS NOTES
[facility] HEART CARE   CARDIOLOGY CONSULT     Laura Romero   [address]  [place] [address]    Gloria Mcnally     Chief Complaint   Patient presents with    New Patient     Abnormal Stress Test         HPI:   Mrs. Romero is a 68 year old female who presents for cardiology evaluation as a result of abnormal stress test. Her PMH includes HLD, HTN, varicose veins, obesity, asthma and hypothyroidism.     Patient was evaluated in clinic on 6/28/2023 due to episode of near syncope. No anginal symptoms endorsed. ECG without ischemic changes. It was recommended stress testing due to CAD risk factors and family history significant for premature CAD.     Patient has been on Lisinopril 20 mg daily for HTN and Rosuvastatin 5 mg nightly for HLD.     Today, patient endorse left precordial heaviness with emotional stressors.  No recent physical exertion.  No radiation of discomfort into her jaw, neck or back.  Occasional left intrascapular pain, she suspects might be musculoskeletal.  This does not come on with physical or emotional stressors.  She has not experienced any increased dyspnea at rest or with exertion.  No palpitations and no recurrence of lightheadedness, no syncope.  She does have mild chronic lower extremity edema secondary to venous insufficiency with varicose veins.    RELEVANT TESTING REVIEWED:  Stress Echocardiogram 7/18/2023  Interpretation Summary   Abnormal exercise stress echocardiogram.   96% of the maximum predicted heart rate was achieved (target is 85%). No wall   motion abnormalities at rest. At peak stress, there was moderate hpokinesis of   the basal inferior and mid inferior segments. This is suggestive of RCA   territory ischemia.   Normal LV size and function at baseline. The LVEF was 55-60% at rest and   declined to 50-55% after exercise.   Normal heart rate and blood pressure response to exercise.   No subjective symptoms to suggest ischemia.   The test was terminated due  to fatigue.   No significant valvular or aortic abnormalities were noted on the screening   images.   The estimated PA systolic pressure was 19 mmHg above the RA pressure.   The ECG portion will be interpreted separately.   No prior stress test was available for comparison.   ______________________________________________________________________________   Stress   Definity (NDC #71378-708-89) given intravenously.   Patient was given 2ml mixture of 1.5ml Definity and 8.5ml saline.   8 ml wasted.   Definity Lot # [id number] .   Target Heart Rate was achieved.   Exercise was stopped due to fatigue.   Target Heart Rate was not achieved due to fatigue.   The patient did not exhibit any symptoms during exercise.   Findings: The patient had a successful 6 minute and 57 seconds stress echocardiogram using standard [place] protocol with increasing resistance every 2 minutes which was terminated due to target heart rate achieved.  The patient's max heart rate was 146 bpm with a maximum blood pressure of 182/100. Maximum of 6.1 METS achieved. The patient did not have any chest pain during the procedure and symptoms were  from normal exercise related shortness of breath.  EKG findings were without any ischemic changes at rest and the patient demonstrated no new ischemic changes throughout the course of the exam.  Deonte Vanegas MD on 7/18/2023 at 3:05 PM     O 7/2023  Findings: Patient's monitor was in place for 10 days and 17 hours.  Minimum heart rate 49 bpm, average heart rate 72 bpm, maximum heart rate 139 bpm. Rhythm/s present include sinus rhythm, supraventricular tachycardia. There were rare ventricular ectopic beats accounting for <1% of all beats. There were 0  ventricular triplets and rare couplets. There were rare supraventricular ectopic beats.  There were 1 triggered events and 1 diary entries during which time the noted rhythms were rhythm.  4 episodes of SVT were present.  Review of actual tracings showed no other  significant abnormality.  Impression: Zio patch monitor showing predominantly sinus rhythm.    Susanne JOHANSEN Bernie DO    Carotid US 2023  IMPRESSION:  1. Less than 50% stenosis of the MARQUEZ.    2. Less than 50% stenosis of the LICA.   JUANIS CARRASCO MD       PAST MEDICAL HISTORY:   Past Medical History:   Diagnosis Date    Bacteremia      ,Negative coag testing    Calculus of kidney     age 19 and age 50    Chronic sinusitis     2011    Deviated nasal septum     8/3/2012    Deviated nasal septum     sever turbinate hypertophy and superior septal deviation to right with recurrent sinusitis    Gastro-esophageal reflux disease without esophagitis     2009    Personal history of other medical treatment (CODE)     rectocele with cervical prolapse    Phlebitis and thrombophlebitis     11, after varicose vein ablation    Phlebitis and thrombophlebitis of lower extremities, unspecified (CODE)     2011,Greater saphenous vein left lower extremity    Pneumonia     2012          FAMILY HISTORY:   Family History   Problem Relation Age of Onset    Heart Disease Mother 49        Heart Disease,MI,  at 69 of a massive MI, she had been a heavy smoker    Hypertension Mother         Hypertension    Arthritis Father         Arthritis,Rheumatoid arthritis    Heart Disease Father         Heart Disease, MI    Hypertension Father         Hypertension    Hypertension Sister     Hypertension Sister         Hypertension    Hypertension Brother     Hypertension Brother         Hypertension    Hypertension Brother     Other - See Comments Maternal Grandmother          of blood clot at childbirth around age 36.    Heart Disease Maternal Grandfather          at 62 of MI    Other - See Comments Paternal Grandmother          after cholecystectomy, very obese    Other - See Comments Paternal Grandfather         had been very anemic prior to death    Other - See Comments Daughter         GI Disease,Ulcerative  colitis diagnosed 2009    Other - See Comments Other         Did have a blood clot    Breast Cancer No family hx of         Cancer-breast          PAST SURGICAL HISTORY:   Past Surgical History:   Procedure Laterality Date    CATARACT EXTRACTION      left eye 1/10/2023 - Dr. Orellana,  right eye planned around 1/24/23.    COLONOSCOPY  10/13/2008    normal.  Next colonoscopy due in 2018.    COLONOSCOPY N/A 10/26/2020    F/U 2025 tubular adenoma    HYSTERECTOMY VAGINAL  04/27/2005    anterior repair with posterior perineorrhaphy and vaginal vault suspension    IR URETERAL STENT REMOVAL RIGHT  08/11/2005    ureteral Stent removal, uncertain laterality    LAPAROSCOPIC TUBAL LIGATION      No Comments Provided    OTHER SURGICAL HISTORY      ,LITHOTRIPSY, with stent placement for right ureteral stone    SEPTOPLASTY  08/2012    with turbinate reduction;  Dr. England    TONSILLECTOMY      No Comments Provided          SOCIAL HISTORY:   Social History     Socioeconomic History    Marital status:      Spouse name: None    Number of children: None    Years of education: None    Highest education level: None   Tobacco Use    Smoking status: Never    Smokeless tobacco: Never   Vaping Use    Vaping Use: Never used   Substance and Sexual Activity    Alcohol use: Yes     Alcohol/week: 0.0 standard drinks of alcohol     Comment: Alcoholic Drinks/day: infrequent social    Drug use: Never     Comment: Drug use: No    Sexual activity: Yes     Partners: Male   Social History Narrative    Retired from  "[employer]; now helping with grandchildren    Patient has never smoked.     Passive smoke exposure - no    Alcohol Use - yes    Drug Use - no    HIV/High Risk - no    Regular Exercise - yes     - Justin          CURRENT MEDICATIONS:   Prior to Admission medications    Medication Sig Start Date End Date Taking? Authorizing Provider   albuterol (PROAIR HFA/PROVENTIL HFA/VENTOLIN HFA) 108 (90 Base) MCG/ACT inhaler Inhale  2 puffs into the lungs every 4 hours as needed for shortness of breath / dyspnea or wheezing Inhale 1-2 puff 12/5/22  Yes Gloria Mcnally MD   budesonide (PULMICORT) 0.5 MG/2ML neb solution SPRAY 2MLS IN NOSTRIL TWICE DAILY MAKE 240CC MICHAEL MED SINUS IRRI. MIX 2ML VIAL OF BUDESONIDE 0.5MG RINSE 1-2 X DAILY AS NEEDED X 2-4 WEEKS 6/13/23  Yes Lorelei Guzman PA-C   cetirizine (ZYRTEC) 10 MG tablet Take 10 mg by mouth daily 12/12/12  Yes Reported, Patient   Cholecalciferol (VITAMIN D PO) Take 1 tablet by mouth daily    Yes Reported, Patient   fluticasone (FLONASE) 50 MCG/ACT nasal spray SHAKE LIQUID AND USE 2 SPRAYS IN EACH NOSTRIL DAILY 12/6/22  Yes Gloria Mcnally MD   levothyroxine (SYNTHROID/LEVOTHROID) 100 MCG tablet Take 1 tablet (100 mcg) by mouth daily 7/11/23  Yes Windy Rodrigues MD   lisinopril (ZESTRIL) 20 MG tablet Take 1 tablet (20 mg) by mouth daily 12/5/22  Yes Gloria Mcnally MD   metroNIDAZOLE (METROGEL) 0.75 % external gel Apply topically 2 times daily  Patient taking differently: Apply topically 2 times daily 12/5/22  Yes Gloria Mcnally MD   Multiple Vitamin (MULTIVITAMIN) per tablet Take 1 tablet by mouth daily. Food 6/20/12  Yes Reported, Patient   rosuvastatin (CRESTOR) 5 MG tablet Take 1 tablet (5 mg) by mouth daily 12/5/22  Yes Gloria Mcnally MD   levothyroxine (SYNTHROID/LEVOTHROID) 112 MCG tablet Take 112 mcg by mouth every third day, 100 mcg daily for 2 days, alternating. 6/1/23   Gloria Mcnally MD          ALLERGIES:   Allergies   Allergen Reactions    Azithromycin Hives     Zithromax            ROS:   CONSTITUTIONAL: No reported fever or chills. No changes in weight.  ENT: No visual disturbance, ear ache, epistaxis or sore throat.   CARDIOVASCULAR: Positive for episodes of chest heaviness/ chest discomfort, see HPI. Positive for racing heart palpitations occasionally at bedtime. Chronic mild lower extremity edema.  "  RESPIRATORY: No shortness of breath, dyspnea upon exertion, cough, wheezing or hemoptysis.   GI: No reported abdominal pain.   : No reported hematuria or dysuria.   NEUROLOGICAL: No recurrence of lightheadedness, dizziness, syncope, ataxia, paresthesias or weakness.   HEMATOLOGIC: No history of anemia. No bleeding or excessive bruising. No history of blood clots.   MUSCULOSKELETAL: No new joint pain or swelling, no muscle pain.  ENDOCRINOLOGIC: No temperature intolerance. No hair or skin changes.  SKIN: No abnormal rashes or sores, no unusual itching.  PSYCHIATRIC: No history of depression, positive for history of anxiety.       PHYSICAL EXAM:   /84 (BP Location: Right arm, Patient Position: Sitting, Cuff Size: Adult Regular)   Pulse 67   Temp 97.8  F (36.6  C) (Temporal)   Resp 14   Ht 1.613 m (5' 3.5\")   Wt 88.5 kg (195 lb 3.2 oz)   LMP 01/01/2005   SpO2 98%   BMI 34.04 kg/m    GENERAL: The patient is a well-developed, well-nourished, in no apparent distress.  HEENT: Head is normocephalic and atraumatic. Eyes are symmetrical with normal visual tracking. No icterus, no xanthelasmas. Nares appeared normal without nasal drainage. Mucous membranes are moist, no cyanosis.  NECK: Supple, no cervical bruits, JVP not visible.   CHEST/ LUNGS: Lungs clear to auscultation, no rales, rhonchi or wheezes, no use of accessory muscles, no retractions, respirations unlabored and normal respiratory rate.   CARDIO: Regular rate and rhythm normal with S1 and S2, no S3 or S4 and no murmur, click or rub.   ABD: Abdomen is nondistended.   EXTREMITIES: Trace LE edema present. Pedal pulses normal and equal bilaterally.  MUSCULOSKELETAL: No visible joint swelling.   NEUROLOGIC: Alert and oriented X3. Normal speech, gait and affect. No focal neurologic deficits.   SKIN: No jaundice. No rashes or visible skin lesions present. No ecchymosis.     EKG:    NSR, rate 65 bpm, no ST-T changes    LAB RESULTS:   Lab on 07/07/2023 "   Component Date Value Ref Range Status    TSH 07/07/2023 0.27 (L)  0.30 - 4.20 uIU/mL Final    Hold Specimen 07/07/2023 [facility]   Final    Free T4 07/07/2023 1.61  0.90 - 1.70 ng/dL Final   Office Visit on 06/28/2023   Component Date Value Ref Range Status    Ventricular Rate 06/28/2023 61  BPM Final    Atrial Rate 06/28/2023 61  BPM Final    MA Interval 06/28/2023 166  ms Final    QRS Duration 06/28/2023 104  ms Final    QT 06/28/2023 436  ms Final    QTc 06/28/2023 438  ms Final    P Axis 06/28/2023 39  degrees Final    R AXIS 06/28/2023 -18  degrees Final    T Axis 06/28/2023 21  degrees Final    Interpretation ECG 06/28/2023    Final                    Value:Sinus rhythm  Normal ECG  When compared with ECG of 03-JAN-2023 09:40,  No significant change was found  Confirmed by MD ANTIONE, CADENCE ([id number]) on 7/1/2023 8:08:29 AM     Office Visit on 06/01/2023   Component Date Value Ref Range Status    Thyroid Peroxidase Antibody 06/01/2023 44 (H)  <35 IU/mL Final    Thyrotropin Receptor Antibody 06/01/2023 <1.10  0.00 - 1.75 IU/L Final    TSH 06/01/2023 0.16 (L)  0.30 - 4.20 uIU/mL Final    Free T4 06/01/2023 1.60  0.90 - 1.70 ng/dL Final    T3 Free 06/01/2023 2.4  2.0 - 4.4 pg/mL Final    Hold Specimen 06/01/2023 [facility]   Final   Office Visit on 05/16/2023   Component Date Value Ref Range Status    WBC Count 05/16/2023 6.9  4.0 - 11.0 10e3/uL Final    RBC Count 05/16/2023 5.00  3.80 - 5.20 10e6/uL Final    Hemoglobin 05/16/2023 15.0  11.7 - 15.7 g/dL Final    Hematocrit 05/16/2023 45.2  35.0 - 47.0 % Final    MCV 05/16/2023 90  78 - 100 fL Final    MCH 05/16/2023 30.0  26.5 - 33.0 pg Final    MCHC 05/16/2023 33.2  31.5 - 36.5 g/dL Final    RDW 05/16/2023 15.8 (H)  10.0 - 15.0 % Final    Platelet Count 05/16/2023 206  150 - 450 10e3/uL Final    Sodium 05/16/2023 140  136 - 145 mmol/L Final    Potassium 05/16/2023 3.9  3.4 - 5.3 mmol/L Final    Chloride 05/16/2023 104  98 - 107 mmol/L Final    Carbon Dioxide (CO2) 05/16/2023  29  22 - 29 mmol/L Final    Anion Gap 05/16/2023 7  7 - 15 mmol/L Final    Urea Nitrogen 05/16/2023 23.5 (H)  8.0 - 23.0 mg/dL Final    Creatinine 05/16/2023 0.62  0.51 - 0.95 mg/dL Final    Calcium 05/16/2023 8.9  8.8 - 10.2 mg/dL Final    Glucose 05/16/2023 85  70 - 99 mg/dL Final    Alkaline Phosphatase 05/16/2023 74  35 - 104 U/L Final    AST 05/16/2023 21  10 - 35 U/L Final    ALT 05/16/2023 21  10 - 35 U/L Final    Protein Total 05/16/2023 7.0  6.4 - 8.3 g/dL Final    Albumin 05/16/2023 4.3  3.5 - 5.2 g/dL Final    Bilirubin Total 05/16/2023 0.5  <=1.2 mg/dL Final    GFR Estimate 05/16/2023 >90  >60 mL/min/1.73m2 Final    TSH 05/16/2023 0.24 (L)  0.30 - 4.20 uIU/mL Final    Free T4 05/16/2023 1.64  0.90 - 1.70 ng/dL Final          ASSESSMENT:   Laura Romero presents for cardiology evaluation as a result of abnormal stress test. Her PMH includes HLD, HTN, varicose veins, obesity, asthma and hypothyroidism.   Today, patient endorse left precordial heaviness with emotional stressors.  No recent physical exertion.  No radiation of discomfort into her jaw, neck or back.  Occasional left intrascapular pain, she suspects might be musculoskeletal.  This does not come on with physical or emotional stressors.  She has not experienced any increased dyspnea at rest or with exertion.  No palpitations and no recurrence of lightheadedness, no syncope.  She does have mild chronic lower extremity edema secondary to venous insufficiency with varicose veins.    1. Abnormal cardiovascular stress test  2. Mixed hyperlipidemia  3. Primary hypertension  4. Family history of premature CAD  5. Chest heaviness      PLAN:   Patient with episodes of chest heaviness, multiple CAD risk factors s/p stress echo on 7/18/2023 revealing evidence of RCA territory ischemia- exercise induced moderate hypokinesis of the basal inferior and mid inferior LV segments, LVEF failed to augment with exercise.   Referral for cardiac cath at [facility].   She  will start ASA 81 mg daily.   BP has been controlled on Lisinopril.   Continue on Rosuvastatin nightly, LDL goal <70 recommended.   Reviewed results of carotid US without flow limiting stenosis of carotid systems.  ZIO reviewed with asymptomatic SVT, no other arrhythmias, rare ectopic beats.     Orders Placed This Encounter   Procedures    EKG 12-lead, tracing only (Same Day)    Case Request Cath Lab: Coronary Angiogram     Thank you for allowing me to participate in the care of your patient. Please do not hesitate to contact me if you have any questions.     Total time 62 min on date of encounter spent reviewing records, face to face time obtaining HPI, physical exam, reviewing results of recent testing, counseling on the above diagnoses and coordination of car.    Sabiha Lakhani, APRN CNP [facility]

## 2023-08-16 DIAGNOSIS — E78.00 HYPERCHOLESTEROLEMIA: ICD-10-CM

## 2023-08-17 RX ORDER — ROSUVASTATIN CALCIUM 5 MG/1
5 TABLET, COATED ORAL DAILY
Qty: 90 TABLET | Refills: 3 | Status: SHIPPED | OUTPATIENT
Start: 2023-08-17 | End: 2024-02-27

## 2023-09-05 ENCOUNTER — APPOINTMENT (OUTPATIENT)
Dept: MEDSURG UNIT | Facility: CLINIC | Age: 68
End: 2023-09-05
Attending: INTERNAL MEDICINE
Payer: MEDICARE

## 2023-09-05 ENCOUNTER — APPOINTMENT (OUTPATIENT)
Dept: LAB | Facility: CLINIC | Age: 68
End: 2023-09-05
Attending: INTERNAL MEDICINE
Payer: MEDICARE

## 2023-09-05 ENCOUNTER — HOSPITAL ENCOUNTER (OUTPATIENT)
Facility: CLINIC | Age: 68
Discharge: HOME | End: 2023-09-05
Attending: INTERNAL MEDICINE | Admitting: INTERNAL MEDICINE
Payer: MEDICARE

## 2023-09-05 VITALS
HEART RATE: 59 BPM | TEMPERATURE: 97.8 F | RESPIRATION RATE: 16 BRPM | WEIGHT: 192.9 LBS | OXYGEN SATURATION: 99 % | BODY MASS INDEX: 33.63 KG/M2 | SYSTOLIC BLOOD PRESSURE: 102 MMHG | DIASTOLIC BLOOD PRESSURE: 63 MMHG

## 2023-09-05 DIAGNOSIS — R07.89 CHEST HEAVINESS: ICD-10-CM

## 2023-09-05 DIAGNOSIS — I10 PRIMARY HYPERTENSION: ICD-10-CM

## 2023-09-05 DIAGNOSIS — E78.2 MIXED HYPERLIPIDEMIA: ICD-10-CM

## 2023-09-05 DIAGNOSIS — R94.39 ABNORMAL CARDIOVASCULAR STRESS TEST: ICD-10-CM

## 2023-09-05 DIAGNOSIS — Z82.49 FAMILY HISTORY OF PREMATURE CAD: ICD-10-CM

## 2023-09-05 DIAGNOSIS — E78.00 HYPERCHOLESTEROLEMIA: Primary | ICD-10-CM

## 2023-09-05 PROBLEM — Z98.890 STATUS POST CORONARY ANGIOGRAM: Status: ACTIVE | Noted: 2023-09-05

## 2023-09-05 LAB
ANION GAP SERPL CALCULATED.3IONS-SCNC: 10 MMOL/L (ref 7–15)
ATRIAL RATE - MUSE: 58 BPM
BUN SERPL-MCNC: 19.5 MG/DL (ref 8–23)
CALCIUM SERPL-MCNC: 8.9 MG/DL (ref 8.8–10.2)
CHLORIDE SERPL-SCNC: 105 MMOL/L (ref 98–107)
CREAT SERPL-MCNC: 0.63 MG/DL (ref 0.51–0.95)
DEPRECATED HCO3 PLAS-SCNC: 27 MMOL/L (ref 22–29)
DIASTOLIC BLOOD PRESSURE - MUSE: NORMAL MMHG
ERYTHROCYTE [DISTWIDTH] IN BLOOD BY AUTOMATED COUNT: 14.6 % (ref 10–15)
GFR SERPL CREATININE-BSD FRML MDRD: >90 ML/MIN/1.73M2
GLUCOSE SERPL-MCNC: 98 MG/DL (ref 70–99)
HCT VFR BLD AUTO: 45.8 % (ref 35–47)
HGB BLD-MCNC: 14.6 G/DL (ref 11.7–15.7)
INTERPRETATION ECG - MUSE: NORMAL
MCH RBC QN AUTO: 29.6 PG (ref 26.5–33)
MCHC RBC AUTO-ENTMCNC: 31.9 G/DL (ref 31.5–36.5)
MCV RBC AUTO: 93 FL (ref 78–100)
P AXIS - MUSE: 29 DEGREES
PLATELET # BLD AUTO: 210 10E3/UL (ref 150–450)
POTASSIUM SERPL-SCNC: 4 MMOL/L (ref 3.4–5.3)
PR INTERVAL - MUSE: 174 MS
QRS DURATION - MUSE: 104 MS
QT - MUSE: 446 MS
QTC - MUSE: 437 MS
R AXIS - MUSE: -28 DEGREES
RBC # BLD AUTO: 4.94 10E6/UL (ref 3.8–5.2)
SODIUM SERPL-SCNC: 142 MMOL/L (ref 136–145)
SYSTOLIC BLOOD PRESSURE - MUSE: NORMAL MMHG
T AXIS - MUSE: 8 DEGREES
VENTRICULAR RATE- MUSE: 58 BPM
WBC # BLD AUTO: 5.8 10E3/UL (ref 4–11)

## 2023-09-05 PROCEDURE — 80048 BASIC METABOLIC PNL TOTAL CA: CPT | Performed by: NURSE PRACTITIONER

## 2023-09-05 PROCEDURE — 85027 COMPLETE CBC AUTOMATED: CPT | Performed by: NURSE PRACTITIONER

## 2023-09-05 PROCEDURE — 99152 MOD SED SAME PHYS/QHP 5/>YRS: CPT | Mod: GC | Performed by: INTERNAL MEDICINE

## 2023-09-05 PROCEDURE — 999N000134 HC STATISTIC PP CARE STAGE 3

## 2023-09-05 PROCEDURE — 99153 MOD SED SAME PHYS/QHP EA: CPT | Performed by: INTERNAL MEDICINE

## 2023-09-05 PROCEDURE — 999N000054 HC STATISTIC EKG NON-CHARGEABLE

## 2023-09-05 PROCEDURE — 258N000003 HC RX IP 258 OP 636: Performed by: NURSE PRACTITIONER

## 2023-09-05 PROCEDURE — 99152 MOD SED SAME PHYS/QHP 5/>YRS: CPT | Performed by: INTERNAL MEDICINE

## 2023-09-05 PROCEDURE — 250N000013 HC RX MED GY IP 250 OP 250 PS 637: Performed by: INTERNAL MEDICINE

## 2023-09-05 PROCEDURE — 93454 CORONARY ARTERY ANGIO S&I: CPT | Mod: 26 | Performed by: INTERNAL MEDICINE

## 2023-09-05 PROCEDURE — C1894 INTRO/SHEATH, NON-LASER: HCPCS | Performed by: INTERNAL MEDICINE

## 2023-09-05 PROCEDURE — 250N000011 HC RX IP 250 OP 636: Mod: JZ | Performed by: INTERNAL MEDICINE

## 2023-09-05 PROCEDURE — 36415 COLL VENOUS BLD VENIPUNCTURE: CPT | Performed by: NURSE PRACTITIONER

## 2023-09-05 PROCEDURE — C1769 GUIDE WIRE: HCPCS | Performed by: INTERNAL MEDICINE

## 2023-09-05 PROCEDURE — 93454 CORONARY ARTERY ANGIO S&I: CPT | Performed by: INTERNAL MEDICINE

## 2023-09-05 PROCEDURE — 93005 ELECTROCARDIOGRAM TRACING: CPT

## 2023-09-05 PROCEDURE — 250N000011 HC RX IP 250 OP 636: Performed by: INTERNAL MEDICINE

## 2023-09-05 PROCEDURE — 250N000009 HC RX 250: Performed by: INTERNAL MEDICINE

## 2023-09-05 PROCEDURE — 999N000142 HC STATISTIC PROCEDURE PREP ONLY

## 2023-09-05 PROCEDURE — 272N000001 HC OR GENERAL SUPPLY STERILE: Performed by: INTERNAL MEDICINE

## 2023-09-05 RX ORDER — POTASSIUM CHLORIDE 750 MG/1
20 TABLET, EXTENDED RELEASE ORAL
Status: DISCONTINUED | OUTPATIENT
Start: 2023-09-05 | End: 2023-09-05 | Stop reason: HOSPADM

## 2023-09-05 RX ORDER — FENTANYL CITRATE 50 UG/ML
INJECTION, SOLUTION INTRAMUSCULAR; INTRAVENOUS
Status: DISCONTINUED | OUTPATIENT
Start: 2023-09-05 | End: 2023-09-05 | Stop reason: HOSPADM

## 2023-09-05 RX ORDER — LIDOCAINE 40 MG/G
CREAM TOPICAL
Status: DISCONTINUED | OUTPATIENT
Start: 2023-09-05 | End: 2023-09-05 | Stop reason: HOSPADM

## 2023-09-05 RX ORDER — ASPIRIN 81 MG/1
81 TABLET ORAL DAILY
Status: DISCONTINUED | OUTPATIENT
Start: 2023-09-06 | End: 2023-09-05 | Stop reason: ALTCHOICE

## 2023-09-05 RX ORDER — SODIUM CHLORIDE 9 MG/ML
INJECTION, SOLUTION INTRAVENOUS CONTINUOUS
Status: DISCONTINUED | OUTPATIENT
Start: 2023-09-05 | End: 2023-09-05 | Stop reason: HOSPADM

## 2023-09-05 RX ORDER — NALOXONE HYDROCHLORIDE 0.4 MG/ML
0.4 INJECTION, SOLUTION INTRAMUSCULAR; INTRAVENOUS; SUBCUTANEOUS
Status: DISCONTINUED | OUTPATIENT
Start: 2023-09-05 | End: 2023-09-05 | Stop reason: HOSPADM

## 2023-09-05 RX ORDER — NICARDIPINE HYDROCHLORIDE 2.5 MG/ML
INJECTION INTRAVENOUS
Status: DISCONTINUED | OUTPATIENT
Start: 2023-09-05 | End: 2023-09-05 | Stop reason: HOSPADM

## 2023-09-05 RX ORDER — NALOXONE HYDROCHLORIDE 0.4 MG/ML
0.2 INJECTION, SOLUTION INTRAMUSCULAR; INTRAVENOUS; SUBCUTANEOUS
Status: DISCONTINUED | OUTPATIENT
Start: 2023-09-05 | End: 2023-09-05 | Stop reason: HOSPADM

## 2023-09-05 RX ORDER — OXYCODONE HYDROCHLORIDE 5 MG/1
5 TABLET ORAL EVERY 4 HOURS PRN
Status: DISCONTINUED | OUTPATIENT
Start: 2023-09-05 | End: 2023-09-05 | Stop reason: HOSPADM

## 2023-09-05 RX ORDER — NITROGLYCERIN 5 MG/ML
VIAL (ML) INTRAVENOUS
Status: DISCONTINUED | OUTPATIENT
Start: 2023-09-05 | End: 2023-09-05 | Stop reason: HOSPADM

## 2023-09-05 RX ORDER — POTASSIUM CHLORIDE 750 MG/1
40 TABLET, EXTENDED RELEASE ORAL
Status: DISCONTINUED | OUTPATIENT
Start: 2023-09-05 | End: 2023-09-05 | Stop reason: HOSPADM

## 2023-09-05 RX ORDER — IOPAMIDOL 755 MG/ML
INJECTION, SOLUTION INTRAVASCULAR
Status: DISCONTINUED | OUTPATIENT
Start: 2023-09-05 | End: 2023-09-05 | Stop reason: HOSPADM

## 2023-09-05 RX ORDER — ASPIRIN 81 MG/1
243 TABLET, CHEWABLE ORAL DAILY
Status: DISCONTINUED | OUTPATIENT
Start: 2023-09-05 | End: 2023-09-05 | Stop reason: HOSPADM

## 2023-09-05 RX ORDER — OXYCODONE HYDROCHLORIDE 10 MG/1
10 TABLET ORAL EVERY 4 HOURS PRN
Status: DISCONTINUED | OUTPATIENT
Start: 2023-09-05 | End: 2023-09-05 | Stop reason: HOSPADM

## 2023-09-05 RX ORDER — ASPIRIN 81 MG/1
81 TABLET, CHEWABLE ORAL DAILY
Status: DISCONTINUED | OUTPATIENT
Start: 2023-09-05 | End: 2023-09-05 | Stop reason: HOSPADM

## 2023-09-05 RX ORDER — ATROPINE SULFATE 0.1 MG/ML
0.5 INJECTION INTRAVENOUS
Status: DISCONTINUED | OUTPATIENT
Start: 2023-09-05 | End: 2023-09-05 | Stop reason: HOSPADM

## 2023-09-05 RX ORDER — HEPARIN SODIUM 1000 [USP'U]/ML
INJECTION, SOLUTION INTRAVENOUS; SUBCUTANEOUS
Status: DISCONTINUED | OUTPATIENT
Start: 2023-09-05 | End: 2023-09-05 | Stop reason: HOSPADM

## 2023-09-05 RX ORDER — FLUMAZENIL 0.1 MG/ML
0.2 INJECTION, SOLUTION INTRAVENOUS
Status: DISCONTINUED | OUTPATIENT
Start: 2023-09-05 | End: 2023-09-05 | Stop reason: HOSPADM

## 2023-09-05 RX ORDER — ATORVASTATIN CALCIUM 20 MG/1
20 TABLET, FILM COATED ORAL EVERY EVENING
Status: DISCONTINUED | OUTPATIENT
Start: 2023-09-05 | End: 2023-09-05 | Stop reason: ALTCHOICE

## 2023-09-05 RX ORDER — FENTANYL CITRATE 50 UG/ML
25 INJECTION, SOLUTION INTRAMUSCULAR; INTRAVENOUS
Status: DISCONTINUED | OUTPATIENT
Start: 2023-09-05 | End: 2023-09-05 | Stop reason: HOSPADM

## 2023-09-05 RX ADMIN — SODIUM CHLORIDE: 9 INJECTION, SOLUTION INTRAVENOUS at 07:53

## 2023-09-05 RX ADMIN — ASPIRIN 81 MG CHEWABLE TABLET 243 MG: 81 TABLET CHEWABLE at 08:37

## 2023-09-05 ASSESSMENT — ACTIVITIES OF DAILY LIVING (ADL)
ADLS_ACUITY_SCORE: 35

## 2023-09-05 NOTE — DISCHARGE INSTRUCTIONS
Going Home after a Coronary Angiogram  ______________________________________________  After you go home:  Have an adult stay with you for 24 hours.  Drink plenty of fluids.  You may eat your normal diet, unless your doctor tells you otherwise.  For 24 hours:  Relax and take it easy.  Do NOT smoke.  Do NOT make any important or legal decisions.  Do NOT drive or operate machines at home or at work.  Do NOT drink alcohol.  Remove the Band-Aid after 24 hours. If there is minor oozing, apply another Band-aid and remove it after 12 hours.  For 2 days, do NOT have sex or do any heavy exercise.  Do NOT take a bath, or use a hot tub or pool for at least 3 days. You may shower.    Care of wrist or arm site  It is normal to have soreness at the puncture site and mild tingling in your hand for up to 3 days.  For 2 days, do not use your hand or arm to support your weight (such as rising from a chair) or bend your wrist (such as lifting a garage door).  For 2 days, do not lift more than 5 pounds or exercise your arm (tennis, golf or bowling).    If you start bleeding from the site in your arm:  Sit down and press firmly on the site with your fingers for 10 minutes. Call your doctor as soon as you can.  If the bleeding stops, sit still and keep your wrist straight for 2 hours.    Medicines  If you have started taking Plavix or Effient, do not stop taking it until you talk to your heart doctor (cardiologist).  If you are on metformin (Glucophage), do not restart it until you have blood tests (within 2 to 3 days after discharge). When your doctor tells you it is safe, you may restart the metformin.  If you have stopped any other medicines, check with your nurse or provider about when to restart them.    Call 911 right away if you have bleeding that is heavy or does not stop.    Call your doctor if:  You have a large or growing hard lump around the site.  The site is red, swollen, hot or tender.  Blood or fluid is draining from  the site.  You have chills or a fever greater than 101 F (38 C).  Your leg or arm feels numb or cool.  You have hives, a rash or unusual itching.  [facility] [place]:  [phone] (7 days a week)

## 2023-09-05 NOTE — PROGRESS NOTES
D/I/A: Pt roomed on 3C in bay [address].  Arrived via litter and accompanied by cath lab RN on  monitor.  VSSA.  Rhythm upon arrival SR on monitor.  Denies pain or sob.  Reviewed activity restrictions and when to notify RN, ie-changes to breathing or increased chest pressure or chest pain.  CCL access:  Left TR band on radial access with 14 ml of air.  Deflation to start at 1035.  P: Continue to monitor status.  Discharge to home once meeting criteria.

## 2023-09-05 NOTE — Clinical Note
RCA Cine(s)  injected and visualized utilizing power injector system.Rate (mL/sec) 3 Total Volume (mL) 10

## 2023-09-05 NOTE — PROGRESS NOTES
D/I/A:  Patient is tolerating liquids and foods, ambulating, urinating, puncture sites are stable (no bleeding and no hematoma) and patient has a  -  Justin.  A+O x4 and making needs known.  CCL access sites C/D/I; no bleeding or hematoma; CMS intact.  VSSA.  SB/SR on monitor.  IV access removed.  Education completed and outlined in AVS or handout: medications reviewed with patient.  Questions answered prior to discharge.  Belongings returned to patient at discharge.    P: Discharged to self care.  Patient to follow up with appts as per discharge instruction.

## 2023-09-18 ENCOUNTER — OFFICE VISIT (OUTPATIENT)
Dept: CARDIOLOGY | Facility: OTHER | Age: 68
End: 2023-09-18
Attending: NURSE PRACTITIONER
Payer: COMMERCIAL

## 2023-09-18 VITALS
SYSTOLIC BLOOD PRESSURE: 104 MMHG | HEART RATE: 68 BPM | BODY MASS INDEX: 34.52 KG/M2 | WEIGHT: 198 LBS | RESPIRATION RATE: 14 BRPM | TEMPERATURE: 98.4 F | DIASTOLIC BLOOD PRESSURE: 70 MMHG | OXYGEN SATURATION: 99 %

## 2023-09-18 DIAGNOSIS — I10 PRIMARY HYPERTENSION: ICD-10-CM

## 2023-09-18 DIAGNOSIS — E78.5 HYPERLIPIDEMIA LDL GOAL <70: ICD-10-CM

## 2023-09-18 DIAGNOSIS — Z98.890 S/P CORONARY ANGIOGRAM: Primary | ICD-10-CM

## 2023-09-18 DIAGNOSIS — E78.2 MIXED HYPERLIPIDEMIA: ICD-10-CM

## 2023-09-18 PROCEDURE — 99214 OFFICE O/P EST MOD 30 MIN: CPT | Performed by: NURSE PRACTITIONER

## 2023-09-18 PROCEDURE — G0463 HOSPITAL OUTPT CLINIC VISIT: HCPCS

## 2023-09-18 ASSESSMENT — PAIN SCALES - GENERAL: PAINLEVEL: NO PAIN (0)

## 2023-09-18 NOTE — PROGRESS NOTES
[facility] HEART CARE   CARDIOLOGY PROGRESS NOTE    Laura Romero   [address]  [place] [address]    Gloria Mcnally     Chief Complaint   Patient presents with    FU After Angiogram     9/5/2023         HPI:   Mrs. Romero is a 68 year old female who presents for cardiology follow-up to visit on 8/15/2023 s/p cardiac cath as a result of abnormal stress test. Her PMH includes HLD, HTN, varicose veins, obesity, asthma and hypothyroidism.     Patient was evaluated in clinic on 6/28/2023 due to episode of near syncope. No anginal symptoms endorsed. ECG without ischemic changes. It was recommended stress testing due to CAD risk factors and family history significant for premature CAD.     Patient has been on Lisinopril 20 mg daily for HTN and Rosuvastatin 5 mg nightly for HLD.     At her last visit patient endorsed left precordial heaviness with emotional stressors.  No recent physical exertion.  No radiation of discomfort into her jaw, neck or back.  Occasional left intrascapular pain, she suspects might be musculoskeletal.  This does not come on with physical or emotional stressors.  She has not experienced any increased dyspnea at rest or with exertion.  No palpitations and no recurrence of lightheadedness, no syncope.  She does have mild chronic lower extremity edema secondary to venous insufficiency with varicose veins.    S/p cardiac cath at [facility] on 9/5/2023 demonstrating 30% pLAD lesion without hemodynamic significance.  Recommended continued medical therapy.    INTERVAL HISTORY:  Today, patient reports feeling very good. No recurrence of chest heaviness, she relates this to anxiety at the time.  She has not experienced any recurrence of lightheadedness.  No increased dyspnea.  No specific concerns today.    RELEVANT TESTING REVIEWED:  Stress Echocardiogram 7/18/2023  Interpretation Summary   Abnormal exercise stress echocardiogram.   96% of the maximum predicted heart rate was achieved  (target is 85%). No wall   motion abnormalities at rest. At peak stress, there was moderate hpokinesis of   the basal inferior and mid inferior segments. This is suggestive of RCA   territory ischemia.   Normal LV size and function at baseline. The LVEF was 55-60% at rest and   declined to 50-55% after exercise.   Normal heart rate and blood pressure response to exercise.   No subjective symptoms to suggest ischemia.   The test was terminated due to fatigue.   No significant valvular or aortic abnormalities were noted on the screening   images.   The estimated PA systolic pressure was 19 mmHg above the RA pressure.   The ECG portion will be interpreted separately.   No prior stress test was available for comparison.   ______________________________________________________________________________   Stress   Definity (NDC #18829-430-02) given intravenously.   Patient was given 2ml mixture of 1.5ml Definity and 8.5ml saline.   8 ml wasted.   Definity Lot # [id number] .   Target Heart Rate was achieved.   Exercise was stopped due to fatigue.   Target Heart Rate was not achieved due to fatigue.   The patient did not exhibit any symptoms during exercise.   Findings: The patient had a successful 6 minute and 57 seconds stress echocardiogram using standard [place] protocol with increasing resistance every 2 minutes which was terminated due to target heart rate achieved.  The patient's max heart rate was 146 bpm with a maximum blood pressure of 182/100. Maximum of 6.1 METS achieved. The patient did not have any chest pain during the procedure and symptoms were  from normal exercise related shortness of breath.  EKG findings were without any ischemic changes at rest and the patient demonstrated no new ischemic changes throughout the course of the exam.  Deonte Vanegas MD on 7/18/2023 at 3:05 PM     [facility] 7/2023  Findings: Patient's monitor was in place for 10 days and 17 hours.  Minimum heart rate 49 bpm, average heart rate 72 bpm,  maximum heart rate 139 bpm. Rhythm/s present include sinus rhythm, supraventricular tachycardia. There were rare ventricular ectopic beats accounting for <1% of all beats. There were 0  ventricular triplets and rare couplets. There were rare supraventricular ectopic beats.  There were 1 triggered events and 1 diary entries during which time the noted rhythms were rhythm.  4 episodes of SVT were present.  Review of actual tracings showed no other significant abnormality.  Impression: Zio patch monitor showing predominantly sinus rhythm.    Susanne Gibbs DO    Carotid US 2023  IMPRESSION:  1. Less than 50% stenosis of the MARQUEZ.    2. Less than 50% stenosis of the LICA.   JUANIS CARRASCO MD       PAST MEDICAL HISTORY:   Past Medical History:   Diagnosis Date    Bacteremia      ,Negative coag testing    Calculus of kidney     age 19 and age 50    Chronic sinusitis     2011    Deviated nasal septum     8/3/2012    Deviated nasal septum     sever turbinate hypertophy and superior septal deviation to right with recurrent sinusitis    Gastro-esophageal reflux disease without esophagitis     2009    Personal history of other medical treatment (CODE)     rectocele with cervical prolapse    Phlebitis and thrombophlebitis     11, after varicose vein ablation    Phlebitis and thrombophlebitis of lower extremities, unspecified (CODE)     2011,Greater saphenous vein left lower extremity    Pneumonia     2012          FAMILY HISTORY:   Family History   Problem Relation Age of Onset    Heart Disease Mother 49        Heart Disease,MI,  at 69 of a massive MI, she had been a heavy smoker    Hypertension Mother         Hypertension    Arthritis Father         Arthritis,Rheumatoid arthritis    Heart Disease Father         Heart Disease, MI    Hypertension Father         Hypertension    Hypertension Sister     Hypertension Sister         Hypertension    Hypertension Brother     Hypertension Brother          Hypertension    Hypertension Brother     Other - See Comments Maternal Grandmother          of blood clot at childbirth around age 36.    Heart Disease Maternal Grandfather          at 62 of MI    Other - See Comments Paternal Grandmother          after cholecystectomy, very obese    Other - See Comments Paternal Grandfather         had been very anemic prior to death    Other - See Comments Daughter         GI Disease,Ulcerative colitis diagnosed     Other - See Comments Other         Did have a blood clot    Breast Cancer No family hx of         Cancer-breast          PAST SURGICAL HISTORY:   Past Surgical History:   Procedure Laterality Date    CATARACT EXTRACTION      left eye 1/10/2023 - Dr. Orellana,  right eye planned around 23.    COLONOSCOPY  10/13/2008    normal.  Next colonoscopy due in 2018.    COLONOSCOPY N/A 10/26/2020    F/U  tubular adenoma    CV CORONARY ANGIOGRAM N/A 2023    Procedure: Coronary Angiogram;  Surgeon: Khai Adler MD;  Location:  HEART CARDIAC CATH LAB    HYSTERECTOMY VAGINAL  2005    anterior repair with posterior perineorrhaphy and vaginal vault suspension    IR URETERAL STENT REMOVAL RIGHT  2005    ureteral Stent removal, uncertain laterality    LAPAROSCOPIC TUBAL LIGATION      No Comments Provided    OTHER SURGICAL HISTORY      ,LITHOTRIPSY, with stent placement for right ureteral stone    SEPTOPLASTY  2012    with turbinate reduction;  Dr. England    TONSILLECTOMY      No Comments Provided          SOCIAL HISTORY:   Social History     Socioeconomic History    Marital status:      Spouse name: None    Number of children: None    Years of education: None    Highest education level: None   Tobacco Use    Smoking status: Never    Smokeless tobacco: Never   Vaping Use    Vaping Use: Never used   Substance and Sexual Activity    Alcohol use: Yes     Alcohol/week: 0.0 standard drinks of alcohol     Comment: Alcoholic  Drinks/day: infrequent social    Drug use: Never     Comment: Drug use: No    Sexual activity: Yes     Partners: Male   Social History Narrative    Retired from  [employer]; now helping with grandchildren    Patient has never smoked.     Passive smoke exposure - no    Alcohol Use - yes    Drug Use - no    HIV/High Risk - no    Regular Exercise - yes     - Justin        CURRENT MEDICATIONS:   Current Outpatient Medications   Medication    albuterol (PROAIR HFA/PROVENTIL HFA/VENTOLIN HFA) 108 (90 Base) MCG/ACT inhaler    budesonide (PULMICORT) 0.5 MG/2ML neb solution    cetirizine (ZYRTEC) 10 MG tablet    Cholecalciferol (VITAMIN D PO)    fluticasone (FLONASE) 50 MCG/ACT nasal spray    levothyroxine (SYNTHROID/LEVOTHROID) 100 MCG tablet    lisinopril (ZESTRIL) 20 MG tablet    metroNIDAZOLE (METROGEL) 0.75 % external gel    Multiple Vitamin (MULTIVITAMIN) per tablet    rosuvastatin (CRESTOR) 5 MG tablet     No current facility-administered medications for this visit.         ALLERGIES:   Allergies   Allergen Reactions    Azithromycin Hives     Zithromax            ROS:   CONSTITUTIONAL: No reported fever or chills. No changes in weight.  ENT: No visual disturbance, ear ache, epistaxis or sore throat.   CARDIOVASCULAR: No recurrence of chest heaviness/ chest discomfort, see HPI. No palpitations. Chronic mild lower extremity edema, chronic venous insufficiency.  RESPIRATORY: No shortness of breath, dyspnea upon exertion, cough, wheezing or hemoptysis.   GI: No reported abdominal pain.   : No reported hematuria or dysuria.   NEUROLOGICAL: No recurrence of lightheadedness, dizziness, syncope, ataxia, paresthesias or weakness.   HEMATOLOGIC: No history of anemia. No bleeding or excessive bruising. No history of blood clots.   MUSCULOSKELETAL: No new joint pain or swelling, no muscle pain.  ENDOCRINOLOGIC: No temperature intolerance. No hair or skin changes.  SKIN: No abnormal rashes or sores, no unusual itching.   Left radial access site healed well.  PSYCHIATRIC: No history of depression, positive for history of anxiety.       PHYSICAL EXAM:   /70 (BP Location: Right arm, Patient Position: Sitting, Cuff Size: Adult Large)   Pulse 68   Temp 98.4  F (36.9  C) (Temporal)   Resp 14   Wt 89.8 kg (198 lb)   LMP 01/01/2005   SpO2 99%   BMI 34.52 kg/m    GENERAL: The patient is a well-developed, well-nourished, in no apparent distress.  HEENT: Head is normocephalic and atraumatic. Eyes are symmetrical with normal visual tracking. No icterus, no xanthelasmas. Nares appeared normal without nasal drainage. Mucous membranes are moist, no cyanosis.  NECK: Supple, no cervical bruits, JVP not visible.   CHEST/ LUNGS: Lungs clear to auscultation, no rales, rhonchi or wheezes, no use of accessory muscles, no retractions, respirations unlabored and normal respiratory rate.   CARDIO: Regular rate and rhythm normal with S1 and S2, no S3 or S4 and no murmur, click or rub.   ABD: Abdomen is nondistended.   EXTREMITIES: Trace LE edema present. Pedal pulses normal and equal bilaterally.  MUSCULOSKELETAL: No visible joint swelling.   NEUROLOGIC: Alert and oriented X3. Normal speech, gait and affect. No focal neurologic deficits.   SKIN: No jaundice. No rashes or visible skin lesions present. No ecchymosis.  Left radial access site healed well.    LAB RESULTS:   Admission on 09/05/2023, Discharged on 09/05/2023   Component Date Value Ref Range Status    Sodium 09/05/2023 142  136 - 145 mmol/L Final    Potassium 09/05/2023 4.0  3.4 - 5.3 mmol/L Final    Chloride 09/05/2023 105  98 - 107 mmol/L Final    Carbon Dioxide (CO2) 09/05/2023 27  22 - 29 mmol/L Final    Anion Gap 09/05/2023 10  7 - 15 mmol/L Final    Urea Nitrogen 09/05/2023 19.5  8.0 - 23.0 mg/dL Final    Creatinine 09/05/2023 0.63  0.51 - 0.95 mg/dL Final    Calcium 09/05/2023 8.9  8.8 - 10.2 mg/dL Final    Glucose 09/05/2023 98  70 - 99 mg/dL Final    GFR Estimate 09/05/2023  >90  >60 mL/min/1.73m2 Final    WBC Count 09/05/2023 5.8  4.0 - 11.0 10e3/uL Final    RBC Count 09/05/2023 4.94  3.80 - 5.20 10e6/uL Final    Hemoglobin 09/05/2023 14.6  11.7 - 15.7 g/dL Final    Hematocrit 09/05/2023 45.8  35.0 - 47.0 % Final    MCV 09/05/2023 93  78 - 100 fL Final    MCH 09/05/2023 29.6  26.5 - 33.0 pg Final    MCHC 09/05/2023 31.9  31.5 - 36.5 g/dL Final    RDW 09/05/2023 14.6  10.0 - 15.0 % Final    Platelet Count 09/05/2023 210  150 - 450 10e3/uL Final    Ventricular Rate 09/05/2023 58  BPM Final    Atrial Rate 09/05/2023 58  BPM Final    SC Interval 09/05/2023 174  ms Final    QRS Duration 09/05/2023 104  ms Final    QT 09/05/2023 446  ms Final    QTc 09/05/2023 437  ms Final    P Axis 09/05/2023 29  degrees Final    R AXIS 09/05/2023 -28  degrees Final    T [place] 09/05/2023 8  degrees Final    Interpretation ECG 09/05/2023    Final                    Value:Sinus bradycardia  Minimal voltage criteria for LVH, may be normal variant ( Ashley product )  Borderline ECG  When compared with ECG of 15-AUG-2023 12:48,  T wave inversion now evident in Inferior leads  T wave amplitude has decreased in Anterior leads  Confirmed by MD LIN, LOR ([id number]) on 9/5/2023 12:58:50 PM           ASSESSMENT:   Laura Romero presents for cardiology follow-up to visit on 8/15/2023 s/p cardiac cath as a result of abnormal stress test. Her PMH includes HLD, HTN, varicose veins, obesity, asthma and hypothyroidism.   Today, patient reports feeling very good. No recurrence of chest heaviness, she relates this to anxiety at the time.  She has not experienced any recurrence of lightheadedness.  No increased dyspnea.  No specific concerns today.    1. S/P coronary angiogram (9/5/2023)- mild non obstructive CAD  2. Mixed hyperlipidemia  3. Hyperlipidemia LDL goal <70  4. Primary hypertension      PLAN:   S/p cardiac cath at [facility] on 9/5/2023 demonstrating 30% pLAD lesion without hemodynamic significance.  Recommended  continued medical therapy.  She does not need to continue on ASA at this time.  3.   BP has been controlled on Lisinopril 20 mg daily.  4.   Continue on Rosuvastatin nightly, LDL goal <70 recommended.   5. DECLANO previously reviewed with asymptomatic SVT, no other arrhythmias, rare ectopic beats.       Thank you for allowing me to participate in the care of your patient. Please do not hesitate to contact me if you have any questions.     Sabiha Lakhani, APRN CNP [facility]

## 2023-09-18 NOTE — NURSING NOTE
"Chief Complaint   Patient presents with    FU After Angiogram     9/5/2023        Initial /70 (BP Location: Right arm, Patient Position: Sitting, Cuff Size: Adult Large)   Pulse 68   Temp 98.4  F (36.9  C) (Temporal)   Resp 14   Wt 89.8 kg (198 lb)   LMP 01/01/2005   SpO2 99%   BMI 34.52 kg/m   Estimated body mass index is 34.52 kg/m  as calculated from the following:    Height as of 8/15/23: 1.613 m (5' 3.5\").    Weight as of this encounter: 89.8 kg (198 lb).  Meds Reconciled: complete  Pt is on Aspirin  Pt is on a Statin  Pt is not on Xarelto or Eliquis  Pt is not on a Warfarin   PHQ and/or LEONARD reviewed. Pt referred to PCP/MH Provider as appropriate.    Yodit Degroot LPN      FOOD SECURITY SCREENING QUESTIONS:    The next two questions are to help us understand your food security.  If you are feeling you need any assistance in this area, we have resources available to support you today.    Hunger Vital Signs:  Within the past 12 months we worried whether our food would run out before we got money to buy more. Never  Within the past 12 months the food we bought just didn't last and we didn't have money to get more. Never  Yodit Degroot LPN,KITN on 9/18/2023 at 2:14 PM      "

## 2023-11-21 DIAGNOSIS — I10 ESSENTIAL HYPERTENSION: ICD-10-CM

## 2023-11-21 RX ORDER — LISINOPRIL 20 MG/1
20 TABLET ORAL DAILY
Qty: 90 TABLET | Refills: 3 | Status: SHIPPED | OUTPATIENT
Start: 2023-11-21 | End: 2024-02-27

## 2023-11-28 ENCOUNTER — PATIENT OUTREACH (OUTPATIENT)
Dept: GASTROENTEROLOGY | Facility: CLINIC | Age: 68
End: 2023-11-28
Payer: COMMERCIAL

## 2023-11-29 ENCOUNTER — MYC MEDICAL ADVICE (OUTPATIENT)
Dept: FAMILY MEDICINE | Facility: OTHER | Age: 68
End: 2023-11-29
Payer: COMMERCIAL

## 2023-12-07 DIAGNOSIS — J30.89 PERENNIAL ALLERGIC RHINITIS: ICD-10-CM

## 2023-12-09 RX ORDER — FLUTICASONE PROPIONATE 50 MCG
SPRAY, SUSPENSION (ML) NASAL
Qty: 16 G | Refills: 11 | Status: ON HOLD | OUTPATIENT
Start: 2023-12-09 | End: 2024-07-29

## 2024-01-09 ENCOUNTER — TRANSFERRED RECORDS (OUTPATIENT)
Dept: HEALTH INFORMATION MANAGEMENT | Facility: OTHER | Age: 69
End: 2024-01-09
Payer: COMMERCIAL

## 2024-01-17 ENCOUNTER — MYC MEDICAL ADVICE (OUTPATIENT)
Dept: FAMILY MEDICINE | Facility: OTHER | Age: 69
End: 2024-01-17

## 2024-01-19 ENCOUNTER — OFFICE VISIT (OUTPATIENT)
Dept: FAMILY MEDICINE | Facility: OTHER | Age: 69
End: 2024-01-19
Attending: PHYSICIAN ASSISTANT
Payer: COMMERCIAL

## 2024-01-19 VITALS
SYSTOLIC BLOOD PRESSURE: 120 MMHG | OXYGEN SATURATION: 97 % | HEART RATE: 76 BPM | WEIGHT: 212 LBS | HEIGHT: 64 IN | BODY MASS INDEX: 36.19 KG/M2 | RESPIRATION RATE: 20 BRPM | DIASTOLIC BLOOD PRESSURE: 70 MMHG | TEMPERATURE: 98.3 F

## 2024-01-19 DIAGNOSIS — E66.01 CLASS 2 SEVERE OBESITY WITH SERIOUS COMORBIDITY AND BODY MASS INDEX (BMI) OF 36.0 TO 36.9 IN ADULT, UNSPECIFIED OBESITY TYPE (H): ICD-10-CM

## 2024-01-19 DIAGNOSIS — J22 LOWER RESP. TRACT INFECTION: Primary | ICD-10-CM

## 2024-01-19 DIAGNOSIS — E66.812 CLASS 2 SEVERE OBESITY WITH SERIOUS COMORBIDITY AND BODY MASS INDEX (BMI) OF 36.0 TO 36.9 IN ADULT, UNSPECIFIED OBESITY TYPE (H): ICD-10-CM

## 2024-01-19 PROCEDURE — 99213 OFFICE O/P EST LOW 20 MIN: CPT | Performed by: PHYSICIAN ASSISTANT

## 2024-01-19 PROCEDURE — G0463 HOSPITAL OUTPT CLINIC VISIT: HCPCS

## 2024-01-19 RX ORDER — VITAMIN B COMPLEX
25 TABLET ORAL DAILY
COMMUNITY

## 2024-01-19 RX ORDER — BENZONATATE 200 MG/1
200 CAPSULE ORAL 3 TIMES DAILY PRN
Qty: 30 CAPSULE | Refills: 1 | Status: SHIPPED | OUTPATIENT
Start: 2024-01-19 | End: 2024-02-27

## 2024-01-19 ASSESSMENT — ASTHMA QUESTIONNAIRES
QUESTION_3 LAST FOUR WEEKS HOW OFTEN DID YOUR ASTHMA SYMPTOMS (WHEEZING, COUGHING, SHORTNESS OF BREATH, CHEST TIGHTNESS OR PAIN) WAKE YOU UP AT NIGHT OR EARLIER THAN USUAL IN THE MORNING: ONCE OR TWICE
QUESTION_5 LAST FOUR WEEKS HOW WOULD YOU RATE YOUR ASTHMA CONTROL: WELL CONTROLLED
QUESTION_4 LAST FOUR WEEKS HOW OFTEN HAVE YOU USED YOUR RESCUE INHALER OR NEBULIZER MEDICATION (SUCH AS ALBUTEROL): ONE OR TWO TIMES PER DAY
QUESTION_1 LAST FOUR WEEKS HOW MUCH OF THE TIME DID YOUR ASTHMA KEEP YOU FROM GETTING AS MUCH DONE AT WORK, SCHOOL OR AT HOME: NONE OF THE TIME
ACT_TOTALSCORE: 20
QUESTION_2 LAST FOUR WEEKS HOW OFTEN HAVE YOU HAD SHORTNESS OF BREATH: NOT AT ALL
ACT_TOTALSCORE: 20

## 2024-01-19 ASSESSMENT — PAIN SCALES - GENERAL: PAINLEVEL: NO PAIN (0)

## 2024-01-19 ASSESSMENT — ENCOUNTER SYMPTOMS: COUGH: 1

## 2024-01-19 NOTE — PATIENT INSTRUCTIONS
Symptoms due to a virus. No antibiotic is needed at this time. Symptoms typically worsen on days 3-4 and then begin improving each day. If symptoms begin worsening or fail to improve after 10 days, return to clinic for reevaluation.     May use symptomatic care with tylenol or ibuprofen. Sudafed or mucinex work well for congestion. May use cough syrup or cough drops.  Using a humidifier works well to break up the congestion. You can also sleep propped up on a couple pillows to decrease symptoms at night.    Please take tylenol as needed up to 4 times daily.  Treat symptomatically with warm salt water gargles.  Lozenges, Tylenol, Advil or Aleve as needed. Frequent swallows of cool liquid.  Oatmeal coats the throat and some patients find it soothes the pain. Encouraged warm teas or fluids with honey.     If you have sinus congestion -  Use a Neti Pot/sinus flush (Sammy Med Sinus Rinse) 3 times daily to irrigate sinuses/mucosal tissue.     Monitor for any fevers or chills. Return in 7-10 days if not feeling better. Please call clinic with any questions or concerns. Return to clinic with change/worsening of symptoms.   Encouraged fluids and rest.    Call 9-1-1 or go to the emergency room if you:  Have trouble breathing   Are drooling because you cannot swallow your saliva   Have swelling of the neck or tongue   Cannot move your neck or have trouble opening your mouth

## 2024-01-19 NOTE — NURSING NOTE
"Chief Complaint   Patient presents with    Cough     Congestion , body aches     Started last Sunday .  Initial /70   Pulse 76   Temp 98.3  F (36.8  C) (Tympanic)   Resp 20   Ht 1.613 m (5' 3.5\")   Wt 96.2 kg (212 lb)   LMP 01/01/2005   BMI 36.97 kg/m   Estimated body mass index is 36.97 kg/m  as calculated from the following:    Height as of this encounter: 1.613 m (5' 3.5\").    Weight as of this encounter: 96.2 kg (212 lb).  Medication Review: complete    The next two questions are to help us understand your food security.  If you are feeling you need any assistance in this area, we have resources available to support you today.          1/19/2024   SDOH- Food Insecurity   Within the past 12 months, did you worry that your food would run out before you got money to buy more? N   Within the past 12 months, did the food you bought just not last and you didn t have money to get more? N         Health Care Directive:  Patient does not have a Health Care Directive or Living Will: Discussed advance care planning with patient; however, patient declined at this time.    Abbie Cardenas, LPN      "

## 2024-01-19 NOTE — PROGRESS NOTES
"  Assessment & Plan   Problem List Items Addressed This Visit          Digestive    Class 2 severe obesity with serious comorbidity and body mass index (BMI) of 36.0 to 36.9 in adult, unspecified obesity type (H) - Primary     Other Visit Diagnoses       Lower resp. tract infection        Relevant Medications    benzonatate (TESSALON) 200 MG capsule           Symptoms likely due to RSV bronchiolitis.  Patient was given Tessalon Perles to use as needed for coughing.  Continue to use your inhaler as needed.  Gave patient education.  Increase fluids with electrolytes and rest.  Gave warning signs and symptoms.  No antibiotics are warranted at this time. Return to clinic with change/worsening of symptoms.     Morbid obesity: Encouraged good diet, exercise, weight loss to help reduce future health disease complications.    Prescription drug management       BMI  Estimated body mass index is 36.97 kg/m  as calculated from the following:    Height as of this encounter: 1.613 m (5' 3.5\").    Weight as of this encounter: 96.2 kg (212 lb).   Weight management plan: Discussed healthy diet and exercise guidelines    See Patient Instructions    No follow-ups on file.      Romain Sanchez is a 69 year old, presenting for the following health issues:  Cough (Congestion , body aches)        1/19/2024     1:51 PM   Additional Questions   Roomed by Floresita VEGA LPN   Accompanied by self     History of Present Illness       Reason for visit:  Cough, congestion, body aches  Symptom onset:  3-7 days ago  Symptoms include:  Cough, body aches, chest congestion, headaches  Symptom intensity:  Moderate  Symptom progression:  Improving  Had these symptoms before:  No  What makes it worse:  Laying down  What makes it better:  Hot tea - hot shower    She eats 2-3 servings of fruits and vegetables daily.She consumes 0 sweetened beverage(s) daily.She exercises with enough effort to increase her heart rate 9 or less minutes per day.  She " "exercises with enough effort to increase her heart rate 3 or less days per week.   She is taking medications regularly.       Patient started develop cold symptoms on 1/14.  Has a chest cold and coughing fits.  The next day she started getting increased congestion and bodyaches.  Symptoms worsen for a few days.  Mildly better yesterday.  Ribs hurt yesterday with coughing.  Slept well last night and had decreased coughing.  Has a little wheezing in her lungs.  History of allergy induced asthma.  Using her inhaler a few times.  Exposed to RSV 6 days prior to getting ill with her granddaughter.  Had a dry cough yesterday.  No fevers.  Body aches have resolved.  No GI or urinary symptoms.  Keeping food and fluids down.  Decreased appetite.  Had 2 negative COVID-19 tests at home.  Using over-the-counter cough and cold remedies as needed for symptomatic relief.    Review of Systems  Constitutional, HEENT, cardiovascular, pulmonary, gi and gu systems are negative, except as otherwise noted.      Objective    /70   Pulse 76   Temp 98.3  F (36.8  C) (Tympanic)   Resp 20   Ht 1.613 m (5' 3.5\")   Wt 96.2 kg (212 lb)   LMP 01/01/2005   SpO2 97%   BMI 36.97 kg/m    Body mass index is 36.97 kg/m .  Physical Exam  Vitals and nursing note reviewed.   Constitutional:       Appearance: Normal appearance.   HENT:      Head: Normocephalic and atraumatic.      Right Ear: Tympanic membrane, ear canal and external ear normal.      Left Ear: Tympanic membrane, ear canal and external ear normal.      Mouth/Throat:      Mouth: Mucous membranes are moist.      Pharynx: Oropharynx is clear. No oropharyngeal exudate or posterior oropharyngeal erythema.   Cardiovascular:      Rate and Rhythm: Normal rate and regular rhythm.      Heart sounds: Normal heart sounds.   Pulmonary:      Effort: Pulmonary effort is normal.      Breath sounds: Normal breath sounds. No rales.      Comments: Very minimal wheezing appreciated the posterior " lobes bilaterally.  Musculoskeletal:         General: Normal range of motion.      Cervical back: Normal range of motion and neck supple.   Lymphadenopathy:      Cervical: No cervical adenopathy.   Skin:     General: Skin is warm and dry.   Neurological:      General: No focal deficit present.      Mental Status: She is alert.   Psychiatric:         Mood and Affect: Mood normal.         Behavior: Behavior normal.            No results found for any visits on 01/19/24.        Signed Electronically by: Lyn Arenas PA-C

## 2024-01-19 NOTE — LETTER
My Asthma Action Plan    Name: Laura Romero   YOB: 1955  Date: 1/19/2024   My doctor: Lyn Arenas PA-C   My clinic: [facility]        My Rescue Medicine:   Albuterol inhaler (Proair/Ventolin/Proventil HFA)  2-4 puffs EVERY 4 HOURS as needed. Use a spacer if recommended by your provider.   My Asthma Severity:   Intermittent / Exercise Induced  Know your asthma triggers: pollens             GREEN ZONE   Good Control  I feel good  No cough or wheeze  Can work, sleep and play without asthma symptoms       Take your asthma control medicine every day.     If exercise triggers your asthma, take your rescue medication  15 minutes before exercise or sports, and  During exercise if you have asthma symptoms  Spacer to use with inhaler: If you have a spacer, make sure to use it with your inhaler             YELLOW ZONE Getting Worse  I have ANY of these:  I do not feel good  Cough or wheeze  Chest feels tight  Wake up at night   Keep taking your Green Zone medications  Start taking your rescue medicine:  every 20 minutes for up to 1 hour. Then every 4 hours for 24-48 hours.  If you stay in the Yellow Zone for more than 12-24 hours, contact your doctor.  If you do not return to the Green Zone in 12-24 hours or you get worse, start taking your oral steroid medicine if prescribed by your provider.           RED ZONE Medical Alert - Get Help  I have ANY of these:  I feel awful  Medicine is not helping  Breathing getting harder  Trouble walking or talking  Nose opens wide to breathe       Take your rescue medicine NOW  If your provider has prescribed an oral steroid medicine, start taking it NOW  Call your doctor NOW  If you are still in the Red Zone after 20 minutes and you have not reached your doctor:  Take your rescue medicine again and  Call 911 or go to the emergency room right away    See your regular doctor within 2 weeks of an Emergency Room or Urgent Care visit for follow-up  treatment.          Annual Reminders:  Meet with Asthma Educator,  Flu Shot in the Fall, consider Pneumonia Vaccination for patients with asthma (aged 19 and older).    Pharmacy: [id number]  [place], [place] - [address]    Electronically signed by Lyn Arenas PA-C   Date: 01/19/24                    Asthma Triggers  How To Control Things That Make Your Asthma Worse    Triggers are things that make your asthma worse.  Look at the list below to help you find your triggers and   what you can do about them. You can help prevent asthma flare-ups by staying away from your triggers.      Trigger                                                          What you can do   Cigarette Smoke  Tobacco smoke can make asthma worse. Do not allow smoking in your home, car or around you.  Be sure no one smokes at a child s day care or school.  If you smoke, ask your health care provider for ways to help you quit.  Ask family members to quit too.  Ask your health care provider for a referral to Quit Plan to help you quit smoking, or call 5-255-818-PLAN.     Colds, Flu, Bronchitis  These are common triggers of asthma. Wash your hands often.  Don t touch your eyes, nose or mouth.  Get a flu shot every year.     Dust Mites  These are tiny bugs that live in cloth or carpet. They are too small to see. Wash sheets and blankets in hot water every week.   Encase pillows and mattress in dust mite proof covers.  Avoid having carpet if you can. If you have carpet, vacuum weekly.   Use a dust mask and HEPA vacuum.   Pollen and Outdoor Mold  Some people are allergic to trees, grass, or weed pollen, or molds. Try to keep your windows closed.  Limit time out doors when pollen count is high.   Ask you health care provider about taking medicine during allergy season.     Animal Dander  Some people are allergic to skin flakes, urine or saliva from pets with fur or feathers. Keep pets with fur or feathers  out of your home.    If you can t keep the pet outdoors, then keep the pet out of your bedroom.  Keep the bedroom door closed.  Keep pets off cloth furniture and away from stuffed toys.     Mice, Rats, and Cockroaches  Some people are allergic to the waste from these pests.   Cover food and garbage.  Clean up spills and food crumbs.  Store grease in the refrigerator.   Keep food out of the bedroom.   Indoor Mold  This can be a trigger if your home has high moisture. Fix leaking faucets, pipes, or other sources of water.   Clean moldy surfaces.  Dehumidify basement if it is damp and smelly.   Smoke, Strong Odors, and Sprays  These can reduce air quality. Stay away from strong odors and sprays, such as perfume, powder, hair spray, paints, smoke incense, paint, cleaning products, candles and new carpet.   Exercise or Sports  Some people with asthma have this trigger. Be active!  Ask your doctor about taking medicine before sports or exercise to prevent symptoms.    Warm up for 5-10 minutes before and after sports or exercise.     Other Triggers of Asthma  Cold air:  Cover your nose and mouth with a scarf.  Sometimes laughing or crying can be a trigger.  Some medicines and food can trigger asthma.

## 2024-02-15 DIAGNOSIS — E78.00 HYPERCHOLESTEROLEMIA: ICD-10-CM

## 2024-02-18 ENCOUNTER — HEALTH MAINTENANCE LETTER (OUTPATIENT)
Age: 69
End: 2024-02-18

## 2024-02-19 RX ORDER — ROSUVASTATIN CALCIUM 5 MG/1
5 TABLET, COATED ORAL DAILY
Qty: 90 TABLET | Refills: 3 | OUTPATIENT
Start: 2024-02-19

## 2024-02-19 NOTE — TELEPHONE ENCOUNTER
Disp Refills Start End YUNIOR   rosuvastatin (CRESTOR) 5 MG tablet 90 tablet 3 8/17/2023 -- No   Sig - Route: TAKE 1 TABLET(5 MG) BY MOUTH DAILY - Oral   Sent to pharmacy as: Rosuvastatin Calcium 5 MG Oral Tablet (CRESTOR)   Class: E-Prescribe   To Ebonie HUANG requesting  Should have refills .  Anny Das RN on 2/19/2024 at 12:27 PM

## 2024-02-26 SDOH — HEALTH STABILITY: PHYSICAL HEALTH: ON AVERAGE, HOW MANY DAYS PER WEEK DO YOU ENGAGE IN MODERATE TO STRENUOUS EXERCISE (LIKE A BRISK WALK)?: 2 DAYS

## 2024-02-26 ASSESSMENT — SOCIAL DETERMINANTS OF HEALTH (SDOH): HOW OFTEN DO YOU GET TOGETHER WITH FRIENDS OR RELATIVES?: THREE TIMES A WEEK

## 2024-02-27 ENCOUNTER — OFFICE VISIT (OUTPATIENT)
Dept: FAMILY MEDICINE | Facility: OTHER | Age: 69
End: 2024-02-27
Attending: FAMILY MEDICINE
Payer: COMMERCIAL

## 2024-02-27 VITALS
HEIGHT: 63 IN | DIASTOLIC BLOOD PRESSURE: 76 MMHG | OXYGEN SATURATION: 97 % | WEIGHT: 214 LBS | HEART RATE: 64 BPM | SYSTOLIC BLOOD PRESSURE: 130 MMHG | RESPIRATION RATE: 16 BRPM | TEMPERATURE: 98 F | BODY MASS INDEX: 37.92 KG/M2

## 2024-02-27 DIAGNOSIS — E78.00 HYPERCHOLESTEROLEMIA: ICD-10-CM

## 2024-02-27 DIAGNOSIS — Z00.00 ENCOUNTER FOR MEDICARE ANNUAL WELLNESS EXAM: Primary | ICD-10-CM

## 2024-02-27 DIAGNOSIS — M19.071 ARTHRITIS OF RIGHT ANKLE: ICD-10-CM

## 2024-02-27 DIAGNOSIS — Z13.0 SCREENING FOR DEFICIENCY ANEMIA: ICD-10-CM

## 2024-02-27 DIAGNOSIS — E03.9 HYPOTHYROIDISM, UNSPECIFIED TYPE: ICD-10-CM

## 2024-02-27 DIAGNOSIS — I10 ESSENTIAL HYPERTENSION: ICD-10-CM

## 2024-02-27 LAB
ALBUMIN SERPL BCG-MCNC: 4.4 G/DL (ref 3.5–5.2)
ALP SERPL-CCNC: 80 U/L (ref 40–150)
ALT SERPL W P-5'-P-CCNC: 18 U/L (ref 0–50)
ANION GAP SERPL CALCULATED.3IONS-SCNC: 10 MMOL/L (ref 7–15)
AST SERPL W P-5'-P-CCNC: 19 U/L (ref 0–45)
BASOPHILS # BLD AUTO: 0.1 10E3/UL (ref 0–0.2)
BASOPHILS NFR BLD AUTO: 1 %
BILIRUB SERPL-MCNC: 0.5 MG/DL
BUN SERPL-MCNC: 17.2 MG/DL (ref 8–23)
CALCIUM SERPL-MCNC: 9.2 MG/DL (ref 8.8–10.2)
CHLORIDE SERPL-SCNC: 105 MMOL/L (ref 98–107)
CHOLEST SERPL-MCNC: 179 MG/DL
CREAT SERPL-MCNC: 0.62 MG/DL (ref 0.51–0.95)
DEPRECATED HCO3 PLAS-SCNC: 27 MMOL/L (ref 22–29)
EGFRCR SERPLBLD CKD-EPI 2021: >90 ML/MIN/1.73M2
EOSINOPHIL # BLD AUTO: 0.1 10E3/UL (ref 0–0.7)
EOSINOPHIL NFR BLD AUTO: 2 %
ERYTHROCYTE [DISTWIDTH] IN BLOOD BY AUTOMATED COUNT: 14.7 % (ref 10–15)
FASTING STATUS PATIENT QL REPORTED: YES
GLUCOSE SERPL-MCNC: 91 MG/DL (ref 70–99)
HCT VFR BLD AUTO: 44.8 % (ref 35–47)
HDLC SERPL-MCNC: 65 MG/DL
HGB BLD-MCNC: 14.6 G/DL (ref 11.7–15.7)
IMM GRANULOCYTES # BLD: 0 10E3/UL
IMM GRANULOCYTES NFR BLD: 0 %
LDLC SERPL CALC-MCNC: 95 MG/DL
LYMPHOCYTES # BLD AUTO: 2.5 10E3/UL (ref 0.8–5.3)
LYMPHOCYTES NFR BLD AUTO: 38 %
MCH RBC QN AUTO: 29.7 PG (ref 26.5–33)
MCHC RBC AUTO-ENTMCNC: 32.6 G/DL (ref 31.5–36.5)
MCV RBC AUTO: 91 FL (ref 78–100)
MONOCYTES # BLD AUTO: 0.4 10E3/UL (ref 0–1.3)
MONOCYTES NFR BLD AUTO: 6 %
NEUTROPHILS # BLD AUTO: 3.5 10E3/UL (ref 1.6–8.3)
NEUTROPHILS NFR BLD AUTO: 53 %
NONHDLC SERPL-MCNC: 114 MG/DL
NRBC # BLD AUTO: 0 10E3/UL
NRBC BLD AUTO-RTO: 0 /100
PLATELET # BLD AUTO: 221 10E3/UL (ref 150–450)
POTASSIUM SERPL-SCNC: 4.1 MMOL/L (ref 3.4–5.3)
PROT SERPL-MCNC: 6.9 G/DL (ref 6.4–8.3)
RBC # BLD AUTO: 4.91 10E6/UL (ref 3.8–5.2)
SODIUM SERPL-SCNC: 142 MMOL/L (ref 135–145)
TRIGL SERPL-MCNC: 95 MG/DL
TSH SERPL DL<=0.005 MIU/L-ACNC: 1.25 UIU/ML (ref 0.3–4.2)
WBC # BLD AUTO: 6.6 10E3/UL (ref 4–11)

## 2024-02-27 PROCEDURE — 36415 COLL VENOUS BLD VENIPUNCTURE: CPT | Mod: ZL | Performed by: FAMILY MEDICINE

## 2024-02-27 PROCEDURE — 84443 ASSAY THYROID STIM HORMONE: CPT | Mod: ZL | Performed by: FAMILY MEDICINE

## 2024-02-27 PROCEDURE — 80053 COMPREHEN METABOLIC PANEL: CPT | Mod: ZL | Performed by: FAMILY MEDICINE

## 2024-02-27 PROCEDURE — G0463 HOSPITAL OUTPT CLINIC VISIT: HCPCS

## 2024-02-27 PROCEDURE — 80061 LIPID PANEL: CPT | Mod: ZL | Performed by: FAMILY MEDICINE

## 2024-02-27 PROCEDURE — 85025 COMPLETE CBC W/AUTO DIFF WBC: CPT | Mod: ZL | Performed by: FAMILY MEDICINE

## 2024-02-27 PROCEDURE — G0439 PPPS, SUBSEQ VISIT: HCPCS | Performed by: FAMILY MEDICINE

## 2024-02-27 RX ORDER — LISINOPRIL 20 MG/1
20 TABLET ORAL DAILY
Qty: 90 TABLET | Refills: 3 | Status: SHIPPED | OUTPATIENT
Start: 2024-02-27

## 2024-02-27 RX ORDER — ROSUVASTATIN CALCIUM 5 MG/1
5 TABLET, COATED ORAL DAILY
Qty: 90 TABLET | Refills: 3 | Status: SHIPPED | OUTPATIENT
Start: 2024-02-27

## 2024-02-27 RX ORDER — LEVOTHYROXINE SODIUM 100 UG/1
100 TABLET ORAL DAILY
Qty: 90 TABLET | Refills: 3 | Status: SHIPPED | OUTPATIENT
Start: 2024-02-27

## 2024-02-27 RX ORDER — METRONIDAZOLE 7.5 MG/G
GEL TOPICAL 2 TIMES DAILY PRN
COMMUNITY
Start: 2024-02-27

## 2024-02-27 ASSESSMENT — PAIN SCALES - GENERAL: PAINLEVEL: NO PAIN (0)

## 2024-02-27 NOTE — PROGRESS NOTES
Preventive Care Visit  [facility]  Gloria Mcnally MD, Family Medicine  Feb 27, 2024        Romain Sanchez is a 69 year old, presenting for the following:  Wellness Visit        2/27/2024    10:43 AM   Additional Questions   Roomed by Henna Kendall         Laura is here today for a wellness visit.      Has been having issues with right ankle pain.  Has very bad arthritis and needs an ankle replacement.  She is not able to walk as well as she used to.      She had lost weight using Optivia.  She went off of it last spring.  She is trying to eat low carb diet still, but has regained weight.    Saw Endocrinology in 9/2023 for her hypothyroidism with labile TSH readings.  She takes her regular dose of Levothyroxine daily except on Sundays, which she takes a 1/2 tablet.  Her next visit is in June.      Had an angiogram in the [facility] after having an abnormal stress test.  Angio was normal.      Health Care Directive  Patient does not have a Health Care Directive or Living Will: Discussed advance care planning with patient; information given to patient to review.    HPI              2/26/2024   General Health   How would you rate your overall physical health? Good   Feel stress (tense, anxious, or unable to sleep) Only a little   (!) STRESS CONCERN      2/26/2024   Nutrition   Diet: Carbohydrate counting         2/26/2024   Exercise   Days per week of moderate/strenous exercise 2 days   (!) EXERCISE CONCERN      2/26/2024   Social Factors   Frequency of gathering with friends or relatives Three times a week   Worry food won't last until get money to buy more No   Food not last or not have enough money for food? No   Do you have housing?  Yes   Are you worried about losing your housing? No   Lack of transportation? No   Unable to get utilities (heat,electricity)? No         2/26/2024   Fall Risk   Fallen 2 or more times in the past year? No   Trouble with walking or balance? No           2/26/2024   Activities of Daily Living- Home Safety   Needs help with the following daily activites None of the above   Safety concerns in the home None of the above         2/26/2024   Dental   Dentist two times every year? Yes         2/26/2024   Hearing Screening   Hearing concerns? None of the above         2/26/2024   Driving Risk Screening   Patient/family members have concerns about driving No         2/26/2024   General Alertness/Fatigue Screening   Have you been more tired than usual lately? No         2/26/2024   Urinary Incontinence Screening   Bothered by leaking urine in past 6 months No         2/26/2024   TB Screening   Were you born outside of [place]?  No         Today's PHQ-2 Score:       2/26/2024     8:46 AM   PHQ-2 ( 1999 [employer])   Q1: Little interest or pleasure in doing things 0   Q2: Feeling down, depressed or hopeless 0   PHQ-2 Score 0   Q1: Little interest or pleasure in doing things Not at all   Q2: Feeling down, depressed or hopeless Not at all   PHQ-2 Score 0           2/26/2024   Substance Use   Alcohol more than 3/day or more than 7/wk No   Do you have a current opioid prescription? No   How severe/bad is pain from 1 to 10? 2/10   Do you use any other substances recreationally? No     Social History     Tobacco Use    Smoking status: Never    Smokeless tobacco: Never   Vaping Use    Vaping Use: Never used   Substance Use Topics    Alcohol use: Yes     Alcohol/week: 0.0 standard drinks of alcohol     Comment: Alcoholic Drinks/day: infrequent social    Drug use: Never     Comment: Drug use: No             2/26/2024   Breast Cancer Screening   Family history of breast, colon, or ovarian cancer? No / Unknown          No data to display                 Mammogram Screening - Mammogram every 1-2 years updated in Health Maintenance based on mutual decision making    ASCVD Risk   The 10-year ASCVD risk score (Yogi ALMARAZ, et al., 2019) is: 10.4%    Values used to calculate the score:       Age: 69 years      Sex: Female      Is Non- : No      Diabetic: No      Tobacco smoker: No      Systolic Blood Pressure: 130 mmHg      Is BP treated: Yes      HDL Cholesterol: 63 mg/dL      Total Cholesterol: 156 mg/dL            Reviewed and updated as needed this visit by Provider                    Past Medical History:   Diagnosis Date    Bacteremia      12/04,Negative coag testing    Calculus of kidney     age 19 and age 50    Chronic sinusitis     5/20/2011    Deviated nasal septum     8/3/2012    Deviated nasal septum     sever turbinate hypertophy and superior septal deviation to right with recurrent sinusitis    Gastro-esophageal reflux disease without esophagitis     8/31/2009    Personal history of other medical treatment (CODE)     rectocele with cervical prolapse    Phlebitis and thrombophlebitis     1/6/11, after varicose vein ablation    Phlebitis and thrombophlebitis of lower extremities, unspecified (CODE)     1/6/2011,Greater saphenous vein left lower extremity    Pneumonia     7/27/2012     Past Surgical History:   Procedure Laterality Date    CATARACT EXTRACTION      left eye 1/10/2023 - Dr. Orellana,  right eye planned around 1/24/23.    COLONOSCOPY  10/13/2008    normal.  Next colonoscopy due in 2018.    COLONOSCOPY N/A 10/26/2020    F/U 2025 tubular adenoma    CV CORONARY ANGIOGRAM N/A 9/5/2023    Procedure: Coronary Angiogram;  Surgeon: Khai Adler MD;  Location:  [facility]    HYSTERECTOMY VAGINAL  04/27/2005    anterior repair with posterior perineorrhaphy and vaginal vault suspension    IR URETERAL STENT REMOVAL RIGHT  08/11/2005    ureteral Stent removal, uncertain laterality    LAPAROSCOPIC TUBAL LIGATION      No Comments Provided    OTHER SURGICAL HISTORY      ,LITHOTRIPSY, with stent placement for right ureteral stone    SEPTOPLASTY  08/2012    with turbinate reduction;  Dr. England    TONSILLECTOMY      No Comments Provided     BP  Readings from Last 3 Encounters:   02/27/24 130/76   01/19/24 120/70   09/18/23 104/70    Wt Readings from Last 3 Encounters:   02/27/24 97.1 kg (214 lb)   01/19/24 96.2 kg (212 lb)   09/18/23 89.8 kg (198 lb)                  Patient Active Problem List   Diagnosis    S/P nasal septoplasty    Perennial allergic rhinitis    ACP (advance care planning)    Allergic rhinitis    HTN (hypertension)    Hyperlipidemia LDL goal <70    Hypothyroidism    Pain in joint, lower leg    Nephrolithiasis    Obesity    Rosacea    Asymptomatic varicose veins    Mild intermittent extrinsic asthma without complication    H/O adenomatous polyp of colon    Mixed hyperlipidemia    Primary hypertension    Chest heaviness    Abnormal cardiovascular stress test    S/P coronary angiogram    Family history of premature CAD    Class 2 severe obesity with serious comorbidity and body mass index (BMI) of 36.0 to 36.9 in adult, unspecified obesity type (H)    Arthritis of right ankle     Past Surgical History:   Procedure Laterality Date    CATARACT EXTRACTION      left eye 1/10/2023 - Dr. Orellana,  right eye planned around 1/24/23.    COLONOSCOPY  10/13/2008    normal.  Next colonoscopy due in 2018.    COLONOSCOPY N/A 10/26/2020    F/U 2025 tubular adenoma    CV CORONARY ANGIOGRAM N/A 9/5/2023    Procedure: Coronary Angiogram;  Surgeon: Khai Adler MD;  Location:  [facility]    HYSTERECTOMY VAGINAL  04/27/2005    anterior repair with posterior perineorrhaphy and vaginal vault suspension    IR URETERAL STENT REMOVAL RIGHT  08/11/2005    ureteral Stent removal, uncertain laterality    LAPAROSCOPIC TUBAL LIGATION      No Comments Provided    OTHER SURGICAL HISTORY      ,LITHOTRIPSY, with stent placement for right ureteral stone    SEPTOPLASTY  08/2012    with turbinate reduction;  Dr. England    TONSILLECTOMY      No Comments Provided       Social History     Tobacco Use    Smoking status: Never    Smokeless tobacco: Never    Substance Use Topics    Alcohol use: Yes     Alcohol/week: 0.0 standard drinks of alcohol     Comment: Alcoholic Drinks/day: infrequent social     Family History   Problem Relation Age of Onset    Heart Disease Mother 49        Heart Disease,MI,  at 69 of a massive MI, she had been a heavy smoker    Hypertension Mother         Hypertension    Arthritis Father         Arthritis,Rheumatoid arthritis    Heart Disease Father         Heart Disease, MI    Hypertension Father         Hypertension    Hypertension Sister     Hypertension Sister         Hypertension    Hypertension Brother     Hypertension Brother         Hypertension    Hypertension Brother     Other - See Comments Maternal Grandmother          of blood clot at childbirth around age 36.    Heart Disease Maternal Grandfather          at 62 of MI    Other - See Comments Paternal Grandmother          after cholecystectomy, very obese    Other - See Comments Paternal Grandfather         had been very anemic prior to death    Other - See Comments Daughter         GI Disease,Ulcerative colitis diagnosed     Other - See Comments Other         Did have a blood clot    Breast Cancer No family hx of         Cancer-breast         Current Outpatient Medications   Medication Sig Dispense Refill    albuterol (PROAIR HFA/PROVENTIL HFA/VENTOLIN HFA) 108 (90 Base) MCG/ACT inhaler Inhale 2 puffs into the lungs every 4 hours as needed for shortness of breath / dyspnea or wheezing Inhale 1-2 puff 18 g 11    budesonide (PULMICORT) 0.5 MG/2ML neb solution SPRAY 2MLS IN NOSTRIL TWICE DAILY MAKE 240CC MICHAEL MED SINUS IRRI. MIX 2ML VIAL OF BUDESONIDE 0.5MG RINSE 1-2 X DAILY AS NEEDED X 2-4 WEEKS 360 mL 4    cetirizine (ZYRTEC) 10 MG tablet Take 10 mg by mouth daily      Cholecalciferol (VITAMIN D PO) Take 1 tablet by mouth daily       cholecalciferol (VITAMIN D3) 10 mcg (400 units) TABS tablet Take 1 tablet by mouth daily      fluticasone (FLONASE) 50 MCG/ACT  nasal spray SHAKE LIQUID AND USE 2 SPRAYS IN EACH NOSTRIL DAILY 16 g 11    levothyroxine (SYNTHROID/LEVOTHROID) 100 MCG tablet Take 1 tablet (100 mcg) by mouth daily 90 tablet 3    lisinopril (ZESTRIL) 20 MG tablet Take 1 tablet (20 mg) by mouth daily 90 tablet 3    metroNIDAZOLE (METROGEL) 0.75 % external gel Apply topically 2 times daily      Multiple Vitamin (MULTIVITAMIN) per tablet Take 1 tablet by mouth daily. Food      rosuvastatin (CRESTOR) 5 MG tablet Take 1 tablet (5 mg) by mouth daily 90 tablet 3     Allergies   Allergen Reactions    Azithromycin Hives     Zithromax       Current providers sharing in care for this patient include:  Patient Care Team:  Gloria Mcnally MD as PCP - General (Family Practice)  Gloria Mcnally MD as Assigned PCP  Lorelei Guzman PA-C as Assigned Surgical Provider  Sourav March DPM as Assigned Musculoskeletal Provider  Sabiha Lakhani APRN CNP as Assigned Heart and Vascular Provider    The following health maintenance items are reviewed in Epic and correct as of today:  Health Maintenance   Topic Date Due    MEDICARE ANNUAL WELLNESS VISIT  12/05/2023    TSH W/FREE T4 REFLEX  07/07/2024    ASTHMA CONTROL TEST  07/19/2024    ASTHMA ACTION PLAN  01/19/2025    FALL RISK ASSESSMENT  02/27/2025    MAMMO SCREENING  07/07/2025    COLORECTAL CANCER SCREENING  10/26/2025    GLUCOSE  09/05/2026    DTAP/TDAP/TD IMMUNIZATION (3 - Td or Tdap) 10/26/2027    LIPID  12/05/2027    ADVANCE CARE PLANNING  12/05/2027    DEXA  10/20/2035    HEPATITIS C SCREENING  Completed    PHQ-2 (once per calendar year)  Completed    INFLUENZA VACCINE  Completed    Pneumococcal Vaccine: 65+ Years  Completed    ZOSTER IMMUNIZATION  Completed    RSV VACCINE (Pregnancy & 60+)  Completed    COVID-19 Vaccine  Completed    IPV IMMUNIZATION  Aged Out    HPV IMMUNIZATION  Aged Out    MENINGITIS IMMUNIZATION  Aged Out    RSV MONOCLONAL ANTIBODY  Aged Out         Review of Systems  Constitutional,  "HEENT, cardiovascular, pulmonary, GI, , musculoskeletal, neuro, skin, endocrine and psych systems are negative, except as otherwise noted.     Objective    Exam  /76   Pulse 64   Temp 98  F (36.7  C) (Tympanic)   Resp 16   Ht 1.6 m (5' 3\")   Wt 97.1 kg (214 lb)   LMP 01/01/2005   SpO2 97%   Breastfeeding No   BMI 37.91 kg/m     Estimated body mass index is 37.91 kg/m  as calculated from the following:    Height as of this encounter: 1.6 m (5' 3\").    Weight as of this encounter: 97.1 kg (214 lb).    Physical Exam  Constitutional:       General: She is not in acute distress.     Appearance: Normal appearance. She is well-developed.   HENT:      Head: Normocephalic.      Right Ear: Tympanic membrane and external ear normal.      Left Ear: Tympanic membrane and external ear normal.      Nose: Nose normal.      Mouth/Throat:      Mouth: Mucous membranes are moist.      Pharynx: Oropharynx is clear. No oropharyngeal exudate or posterior oropharyngeal erythema.   Eyes:      General:         Right eye: No discharge.         Left eye: No discharge.      Conjunctiva/sclera: Conjunctivae normal.      Pupils: Pupils are equal, round, and reactive to light.   Neck:      Thyroid: No thyromegaly.      Trachea: No tracheal deviation.   Cardiovascular:      Rate and Rhythm: Normal rate and regular rhythm.      Pulses: Normal pulses.      Heart sounds: Normal heart sounds, S1 normal and S2 normal. No murmur heard.     No friction rub. No gallop. No S3 or S4 sounds.   Pulmonary:      Effort: Pulmonary effort is normal. No respiratory distress.      Breath sounds: Normal breath sounds. No wheezing or rales.      Comments: Breast exam:  No masses palpable bilaterally.  No skin changes, tethering or axillary lymphadenopathy bilaterally.    Abdominal:      General: Bowel sounds are normal. There is no distension.      Palpations: Abdomen is soft. There is no mass.      Tenderness: There is no abdominal tenderness. "   Genitourinary:     Comments: Pelvic/Rectal exams deferred per patient.  Musculoskeletal:         General: Normal range of motion.      Cervical back: Neck supple.   Lymphadenopathy:      Cervical: No cervical adenopathy.   Skin:     General: Skin is warm and dry.      Findings: No rash.   Neurological:      Mental Status: She is alert and oriented to person, place, and time.      Motor: No abnormal muscle tone.      Deep Tendon Reflexes: Reflexes are normal and symmetric.   Psychiatric:         Mood and Affect: Mood normal.         Thought Content: Thought content normal.         Judgment: Judgment normal.             2/27/2024   Mini Cog   Clock Draw Score 2 Normal   3 Item Recall 3 objects recalled   Mini Cog Total Score 5              ICD-10-CM    1. Encounter for Medicare annual wellness exam  Z00.00       2. Hypothyroidism, unspecified type  E03.9 TSH with free T4 reflex     levothyroxine (SYNTHROID/LEVOTHROID) 100 MCG tablet     TSH with free T4 reflex      3. Essential hypertension  I10 Comprehensive metabolic panel     lisinopril (ZESTRIL) 20 MG tablet     Comprehensive metabolic panel      4. Hypercholesterolemia  E78.00 Lipid Profile     rosuvastatin (CRESTOR) 5 MG tablet     Lipid Profile      5. Screening for deficiency anemia  Z13.0 CBC with Platelets & Differential     CBC with Platelets & Differential      6. Arthritis of right ankle  M19.071           Mammogram last completed 7/7/2023.  DEXA scan last completed 10/20/2020 and was normal.  Colonoscopy last completed 10/26/2020 and showed a tubular adenoma.  5-year follow-up was recommended.  Pap deferred due to age greater than 65.  AAA screening previously completed in 2020 and was normal.  Vaccines were reviewed and are all up-to-date.  TSH completed as above.  She is on levothyroxine 100 mcg daily, but only takes 50 mcg on Sundays per endocrinology recommendations.  She will be following up with them again as noted above.  Blood pressure is  stable.  Labs as above.  Labs as above.  She is on rosuvastatin which is refilled.  CBC as above.  She is following with Dr. March currently.    No follow-ups on file.           Gloria Mcnally MD

## 2024-02-27 NOTE — PATIENT INSTRUCTIONS
Preventive Care Advice   This is general advice given by our system to help you stay healthy. However, your care team may have specific advice just for you. Please talk to your care team about your preventive care needs.  Nutrition  Eat 5 or more servings of fruits and vegetables each day.  Try wheat bread, brown rice and whole grain pasta (instead of white bread, rice, and pasta).  Get enough calcium and vitamin D. Check the label on foods and aim for 100% of the RDA (recommended daily allowance).  Lifestyle  Exercise at least 150 minutes each week   (30 minutes a day, 5 days a week).  Do muscle strengthening activities 2 days a week. These help control your weight and prevent disease.  No smoking.  Wear sunscreen to prevent skin cancer.  Have a dental exam and cleaning every 6 months.  Yearly exams  See your health care team every year to talk about:  Any changes in your health.  Any medicines your care team has prescribed.  Preventive care, family planning, and ways to prevent chronic diseases.  Shots (vaccines)   HPV shots (up to age 26), if you've never had them before.  Hepatitis B shots (up to age 59), if you've never had them before.  COVID-19 shot: Get this shot when it's due.  Flu shot: Get a flu shot every year.  Tetanus shot: Get a tetanus shot every 10 years.  Pneumococcal, hepatitis A, and RSV shots: Ask your care team if you need these based on your risk.  Shingles shot (for age 50 and up).  General health tests  Diabetes screening:  Starting at age 35, Get screened for diabetes at least every 3 years.  If you are younger than age 35, ask your care team if you should be screened for diabetes.  Cholesterol test: At age 39, start having a cholesterol test every 5 years, or more often if advised.  Bone density scan (DEXA): At age 50, ask your care team if you should have this scan for osteoporosis (brittle bones).  Hepatitis C: Get tested at least once in your life.  STIs (sexually transmitted  infections)  Before age 24: Ask your care team if you should be screened for STIs.  After age 24: Get screened for STIs if you're at risk. You are at risk for STIs (including HIV) if:  You are sexually active with more than one person.  You don't use condoms every time.  You or a partner was diagnosed with a sexually transmitted infection.  If you are at risk for HIV, ask about PrEP medicine to prevent HIV.  Get tested for HIV at least once in your life, whether you are at risk for HIV or not.  Cancer screening tests  Cervical cancer screening: If you have a cervix, begin getting regular cervical cancer screening tests at age 21. Most people who have regular screenings with normal results can stop after age 65. Talk about this with your provider.  Breast cancer scan (mammogram): If you've ever had breasts, begin having regular mammograms starting at age 40. This is a scan to check for breast cancer.  Colon cancer screening: It is important to start screening for colon cancer at age 45.  Have a colonoscopy test every 10 years (or more often if you're at risk) Or, ask your provider about stool tests like a FIT test every year or Cologuard test every 3 years.  To learn more about your testing options, visit: https://www.appsplit/872559.pdf.  For help making a decision, visit: https://bit.ly/gz07585.  Prostate cancer screening test: If you have a prostate and are age 55 to 69, ask your provider if you would benefit from a yearly prostate cancer screening test.  Lung cancer screening: If you are a current or former smoker age 50 to 80, ask your care team if ongoing lung cancer screenings are right for you.  For informational purposes only. Not to replace the advice of your health care provider. Copyright   2023 [employer]. All rights reserved. Clinically reviewed by the [facility]. [employer] [id number] - REV 01/24.    Learning About Stress  What is stress?     Stress is your  body's response to a hard situation. Your body can have a physical, emotional, or mental response. Stress is a fact of life for most people, and it affects everyone differently. What causes stress for you may not be stressful for someone else.  A lot of things can cause stress. You may feel stress when you go on a job interview, take a test, or run a race. This kind of short-term stress is normal and even useful. It can help you if you need to work hard or react quickly. For example, stress can help you finish an important job on time.  Long-term stress is caused by ongoing stressful situations or events. Examples of long-term stress include long-term health problems, ongoing problems at work, or conflicts in your family. Long-term stress can harm your health.  How does stress affect your health?  When you are stressed, your body responds as though you are in danger. It makes hormones that speed up your heart, make you breathe faster, and give you a burst of energy. This is called the fight-or-flight stress response. If the stress is over quickly, your body goes back to normal and no harm is done.  But if stress happens too often or lasts too long, it can have bad effects. Long-term stress can make you more likely to get sick, and it can make symptoms of some diseases worse. If you tense up when you are stressed, you may develop neck, shoulder, or low back pain. Stress is linked to high blood pressure and heart disease.  Stress also harms your emotional health. It can make you jorgensen, tense, or depressed. Your relationships may suffer, and you may not do well at work or school.  What can you do to manage stress?  You can try these things to help manage stress:   Do something active. Exercise or activity can help reduce stress. Walking is a great way to get started. Even everyday activities such as housecleaning or yard work can help.  Try yoga or spencer chi. These techniques combine exercise and meditation. You may need  some training at first to learn them.  Do something you enjoy. For example, listen to music or go to a movie. Practice your hobby or do volunteer work.  Meditate. This can help you relax, because you are not worrying about what happened before or what may happen in the future.  Do guided imagery. Imagine yourself in any setting that helps you feel calm. You can use online videos, books, or a teacher to guide you.  Do breathing exercises. For example:  From a standing position, bend forward from the waist with your knees slightly bent. Let your arms dangle close to the floor.  Breathe in slowly and deeply as you return to a standing position. Roll up slowly and lift your head last.  Hold your breath for just a few seconds in the standing position.  Breathe out slowly and bend forward from the waist.  Let your feelings out. Talk, laugh, cry, and express anger when you need to. Talking with supportive friends or family, a counselor, or a rocio leader about your feelings is a healthy way to relieve stress. Avoid discussing your feelings with people who make you feel worse.  Write. It may help to write about things that are bothering you. This helps you find out how much stress you feel and what is causing it. When you know this, you can find better ways to cope.  What can you do to prevent stress?  You might try some of these things to help prevent stress:  Manage your time. This helps you find time to do the things you want and need to do.  Get enough sleep. Your body recovers from the stresses of the day while you are sleeping.  Get support. Your family, friends, and community can make a difference in how you experience stress.  Limit your news feed. Avoid or limit time on social media or news that may make you feel stressed.  Do something active. Exercise or activity can help reduce stress. Walking is a great way to get started.  Where can you learn more?  Go to https://www.healthwise.net/patiented  Enter N032 in the  "search box to learn more about \"Learning About Stress.\"  Current as of: February 26, 2023               Content Version: 13.8    3437-6511 [employer].   Care instructions adapted under license by your healthcare professional. If you have questions about a medical condition or this instruction, always ask your healthcare professional. [employer] disclaims any warranty or liability for your use of this information.      "

## 2024-02-27 NOTE — NURSING NOTE
Chief Complaint   Patient presents with    Wellness Visit         Medication Reconciliation: complete    Henna Kendall, LPN

## 2024-03-14 ENCOUNTER — OFFICE VISIT (OUTPATIENT)
Dept: ORTHOPEDICS | Facility: OTHER | Age: 69
End: 2024-03-14
Attending: PODIATRIST
Payer: MEDICARE

## 2024-03-14 DIAGNOSIS — M79.671 RIGHT FOOT PAIN: Primary | ICD-10-CM

## 2024-03-14 PROCEDURE — 250N000011 HC RX IP 250 OP 636: Performed by: PODIATRIST

## 2024-03-14 PROCEDURE — 20600 DRAIN/INJ JOINT/BURSA W/O US: CPT | Mod: RT | Performed by: PODIATRIST

## 2024-03-14 PROCEDURE — G0463 HOSPITAL OUTPT CLINIC VISIT: HCPCS

## 2024-03-14 PROCEDURE — 250N000009 HC RX 250: Performed by: PODIATRIST

## 2024-03-14 PROCEDURE — 20605 DRAIN/INJ JOINT/BURSA W/O US: CPT | Mod: RT | Performed by: PODIATRIST

## 2024-03-14 PROCEDURE — 99212 OFFICE O/P EST SF 10 MIN: CPT | Mod: 25 | Performed by: PODIATRIST

## 2024-03-14 RX ORDER — TRIAMCINOLONE ACETONIDE 40 MG/ML
20 INJECTION, SUSPENSION INTRA-ARTICULAR; INTRAMUSCULAR ONCE
Status: COMPLETED | OUTPATIENT
Start: 2024-03-14 | End: 2024-03-14

## 2024-03-14 RX ORDER — LIDOCAINE HYDROCHLORIDE 10 MG/ML
1 INJECTION, SOLUTION EPIDURAL; INFILTRATION; INTRACAUDAL; PERINEURAL ONCE
Status: COMPLETED | OUTPATIENT
Start: 2024-03-14 | End: 2024-03-14

## 2024-03-14 RX ADMIN — TRIAMCINOLONE ACETONIDE 20 MG: 40 INJECTION, SUSPENSION INTRA-ARTICULAR; INTRAMUSCULAR at 12:43

## 2024-03-14 RX ADMIN — LIDOCAINE HYDROCHLORIDE 1 ML: 10 INJECTION, SOLUTION EPIDURAL; INFILTRATION; INTRACAUDAL; PERINEURAL at 12:43

## 2024-03-14 NOTE — PROGRESS NOTES
+left lower abdominal wound that has been draining purulent drainage since having surgery in august. Reports not feeling well last week. Pt has has been seen for same issue since having surgery but reports it is not improving.    SUBJECTIVE:  Laura is here requesting an ankle injection.  She has a known bunion and ankle arthrosis.  Injection in the past has helped significantly.  The last injection was in June.  We have discussed other options as long as this helps she would like to continue with this but does think she would like to pursue other options as needed.    ROS: Musculoskeletal and general review of systems are negative, per review of previous clinic questionnaire.  Denies SOB and calf pain.    EXAM:   PHYSICAL EXAMINATION:   CONSTITUTIONAL:  The patient is alert and oriented x 3, well appearing and in no apparent distress.  Affect is pleasant and answers questions appropriately.  VASCULAR:  Circulation is intact with palpable pedal pulses and adequate capillary fill time to all digits.  Hair growth is present and appropriate to mid foot and digits. Calf nontender.  NEUROLOGIC:  Light touch sensation is intact to digits.  There is a negative Tinel sign.    INTEGUMENT:  No abnormal dermatologic lesions are noted.  Skin has normal texture and turgor.    MUSCULOSKELETAL: Exam is unchanged she has crepitus pain diffusely through the ankle.    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  Repeat injection performed in addition to office visit.  As this wears off we will consider options.  20 minutes time spent procedure in addition.    Procedure: Patient's right medial ankle prepped with alcohol.  Injected this interval into the joint with 20 mg of Kenalog and 1 mL of 1% lidocaine plain.  Procedure tolerated well.    Sourav March DPM

## 2024-04-07 NOTE — LETTER
12/17/2021         RE: Laura Romero  [address]  [place] [address]        Dear Colleague,    Thank you for referring your patient, Laura Romero, to the [facility]. Please see a copy of my visit note below.    Chief Complaint   Patient presents with     Epistaxis     Pt is here for a f/u epistaxis and nasal septal spur.         Patient presents to ENT for concerns of epistaxis.   She had noted right epistaxis. At her last visit, she was started on nasal cares and recommended to return if ongoing issues. Today, she ahs been doing well   She had noticed there is less bleeding, congestion.   Reports there were small blood at times.   She has been using nasal saline and nasal ayr gel.       No recent use of rinses.   Her allergies and sinuses have overall been doing well.   Breathing has been well. No concerns for wheezing.   Hx of nasal septoplasty.  Hx of SCIT.      MQT- 10/3/19  Dilution #6-None  Dilution #5- Oak, Alternia, helmintherosoirum, dust.   Dilution #2- weeds, grass, trees, mold, cat, dog        Past Medical History:   Diagnosis Date     Bacteremia      12/04,Negative coag testing     Calculus of kidney     age 19 and age 50     Chronic sinusitis     5/20/2011     Deviated nasal septum     8/3/2012     Deviated nasal septum     sever turbinate hypertophy and superior septal deviation to right with recurrent sinusitis     Gastro-esophageal reflux disease without esophagitis     8/31/2009     Personal history of other medical treatment (CODE)     rectocele with cervical prolapse     Phlebitis and thrombophlebitis     1/6/11, after varicose vein ablation     Phlebitis and thrombophlebitis of lower extremities, unspecified (CODE)     1/6/2011,Greater saphenous vein left lower extremity     Pneumonia     7/27/2012        Allergies   Allergen Reactions     Azithromycin Hives     Zithromax       Current Outpatient Medications   Medication     albuterol (PROAIR  "HFA/PROVENTIL HFA/VENTOLIN HFA) 108 (90 Base) MCG/ACT inhaler     aspirin EC 81 MG EC tablet     budesonide (PULMICORT) 0.5 MG/2ML neb solution     cetirizine (ZYRTEC) 10 MG tablet     Cholecalciferol (VITAMIN D PO)     fluticasone (FLONASE) 50 MCG/ACT nasal spray     Krill Oil 500 MG CAPS     levothyroxine (SYNTHROID/LEVOTHROID) 100 MCG tablet     lisinopril (ZESTRIL) 20 MG tablet     metroNIDAZOLE (METROGEL) 0.75 % external gel     Multiple Vitamin (MULTIVITAMIN) per tablet     other medical supplies     pravastatin (PRAVACHOL) 40 MG tablet     No current facility-administered medications for this visit.     ROS- SEE HPI  /72 (Cuff Size: Adult Large)   Pulse 75   Temp 97.6  F (36.4  C) (Tympanic)   Ht 1.613 m (5' 3.5\")   Wt 108 kg (238 lb)   LMP  (LMP Unknown)   SpO2 97%   BMI 41.50 kg/m      General - The patient is well nourished and well developed, and appears to have good nutritional status.  Alert and oriented to person and place, answers questions and cooperates with examination appropriately.   Head and Face - Normocephalic and atraumatic, with no gross asymmetry noted.  The facial nerve is intact, with strong symmetric movements.  Voice and Breathing - The patient was breathing comfortably without the use of accessory muscles. There was no wheezing, stridor, or stertor.  The patients voice was clear and strong, and had appropriate pitch and quality.  Ears -The external auditory canals are patent, the tympanic membranes are intact without effusion, retraction or mass.  Bony landmarks are intact.  Eyes - Extraocular movements intact, and the pupils were reactive to light.  Sclera were not icteric or injected, conjunctiva were pink and moist.  Mouth - Examination of the oral cavity showed pink, healthy oral mucosa. No lesions or ulcerations noted.  The tongue was mobile and midline, and the dentition were in good condition.    Crowded posterior pharynx. FTP IV  Throat - The walls of the " oropharynx were smooth, pink, moist, symmetric, and had no lesions or ulcerations.  The tonsillar pillars and soft palate were symmetric.  The uvula was midline on elevation.    Neck - Normal midline excursion of the laryngotracheal complex during swallowing.  Full range of motion on passive movement.  Palpation of the occipital, submental, submandibular, internal jugular chain, and supraclavicular nodes did not demonstrate any abnormal lymph nodes or masses.  Palpation of the thyroid was soft and smooth, with no nodules or goiter appreciated.  The trachea was mobile and midline.       Septum: Midline. Right anterior septum- ulceration along septum. No active bleeding.        ASSESSMENT/ PLAN:    ICD-10-CM    1. Nasal septal ulcer  J34.0    2. H/O epistaxis  Z87.898      Use Nasal saline 2 sprays to each nostril 2 times daily  Continue with Nasal Ayr gel.   Humidifiers  Use Sammy med rinse as needed   Use Budesonide rinses if increase in nasal congestion/ sinus pressure.       Return to ENT if recurrent epistaxis.             Lorelei Guzman PA-C  ENT  [facility], [place]          Again, thank you for allowing me to participate in the care of your patient.        Sincerely,        Lorelei Guzman PA-C     Unsure (2)

## 2024-06-03 ENCOUNTER — DOCUMENTATION ONLY (OUTPATIENT)
Dept: FAMILY MEDICINE | Facility: OTHER | Age: 69
End: 2024-06-03
Payer: COMMERCIAL

## 2024-06-03 DIAGNOSIS — E03.9 HYPOTHYROIDISM, UNSPECIFIED TYPE: Primary | ICD-10-CM

## 2024-06-03 NOTE — PROGRESS NOTES
Patient had medicare exam and labs checked 2/27/24. I do not see any mention of patient returning for follow up labs. Are you needing any labs repeated? (Pt does not have any upcoming appointments scheduled with Dr. Saad Barrera)     Henna Chatman CMA on 6/3/2024 at 1:50 PM

## 2024-06-03 NOTE — PROGRESS NOTES
I'm assuming she wants her TSH checked.  She had some fluctuations with weight loss.  This is ordered.  Please confirm with patient that this is what she wanted to have checked.  If there were additional orders she wanted, please route back to me.  Gloria Mcnally MD

## 2024-06-04 ENCOUNTER — TELEPHONE (OUTPATIENT)
Dept: FAMILY MEDICINE | Facility: OTHER | Age: 69
End: 2024-06-04
Payer: COMMERCIAL

## 2024-06-04 DIAGNOSIS — E03.9 HYPOTHYROIDISM, UNSPECIFIED TYPE: Primary | ICD-10-CM

## 2024-06-04 NOTE — PROGRESS NOTES
Patient states she needs thyroid lab work done for another provider. There were 2 thyroid tests requested. She is going to look to see what exact labs they are requesting and call back so we have the correct orders entered.     Henna Chatman CMA on 6/4/2024 at 8:22 AM

## 2024-06-04 NOTE — TELEPHONE ENCOUNTER
Patient returned call and stated the specialist requests the T4 free and thyroid simulating hormone be checked.          Seema Angeles on 6/4/2024 at 8:36 AM

## 2024-06-10 ENCOUNTER — LAB (OUTPATIENT)
Dept: LAB | Facility: OTHER | Age: 69
End: 2024-06-10
Attending: FAMILY MEDICINE
Payer: MEDICARE

## 2024-06-10 DIAGNOSIS — E03.9 HYPOTHYROIDISM, UNSPECIFIED TYPE: ICD-10-CM

## 2024-06-10 LAB
T4 FREE SERPL-MCNC: 1.27 NG/DL (ref 0.9–1.7)
TSH SERPL DL<=0.005 MIU/L-ACNC: 2.3 UIU/ML (ref 0.3–4.2)

## 2024-06-10 PROCEDURE — 36415 COLL VENOUS BLD VENIPUNCTURE: CPT | Mod: ZL

## 2024-06-10 PROCEDURE — 84439 ASSAY OF FREE THYROXINE: CPT | Mod: ZL

## 2024-06-10 PROCEDURE — 84443 ASSAY THYROID STIM HORMONE: CPT | Mod: ZL

## 2024-07-01 DIAGNOSIS — E06.3 HYPOTHYROIDISM DUE TO HASHIMOTO'S THYROIDITIS: Primary | ICD-10-CM

## 2024-07-28 ENCOUNTER — APPOINTMENT (OUTPATIENT)
Dept: GENERAL RADIOLOGY | Facility: OTHER | Age: 69
End: 2024-07-28
Attending: PHYSICIAN ASSISTANT
Payer: MEDICARE

## 2024-07-28 ENCOUNTER — HOSPITAL ENCOUNTER (OUTPATIENT)
Facility: OTHER | Age: 69
Setting detail: OBSERVATION
Discharge: HOME | End: 2024-07-29
Attending: PHYSICIAN ASSISTANT | Admitting: INTERNAL MEDICINE
Payer: MEDICARE
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
## 2024-07-28 DIAGNOSIS — S42.291A HUMERAL HEAD FRACTURE, RIGHT, CLOSED, INITIAL ENCOUNTER: ICD-10-CM

## 2024-07-28 DIAGNOSIS — S42.211A CLOSED DISPLACED FRACTURE OF SURGICAL NECK OF RIGHT HUMERUS, UNSPECIFIED FRACTURE MORPHOLOGY, INITIAL ENCOUNTER: ICD-10-CM

## 2024-07-28 LAB
ANION GAP SERPL CALCULATED.3IONS-SCNC: 12 MMOL/L (ref 7–15)
BASOPHILS # BLD AUTO: 0.1 10E3/UL (ref 0–0.2)
BASOPHILS NFR BLD AUTO: 1 %
BUN SERPL-MCNC: 22.2 MG/DL (ref 8–23)
CALCIUM SERPL-MCNC: 8.8 MG/DL (ref 8.8–10.4)
CHLORIDE SERPL-SCNC: 103 MMOL/L (ref 98–107)
CREAT SERPL-MCNC: 0.58 MG/DL (ref 0.51–0.95)
EGFRCR SERPLBLD CKD-EPI 2021: >90 ML/MIN/1.73M2
EOSINOPHIL # BLD AUTO: 0.2 10E3/UL (ref 0–0.7)
EOSINOPHIL NFR BLD AUTO: 2 %
ERYTHROCYTE [DISTWIDTH] IN BLOOD BY AUTOMATED COUNT: 14.2 % (ref 10–15)
GLUCOSE SERPL-MCNC: 170 MG/DL (ref 70–99)
HCO3 SERPL-SCNC: 24 MMOL/L (ref 22–29)
HCT VFR BLD AUTO: 43 % (ref 35–47)
HGB BLD-MCNC: 14 G/DL (ref 11.7–15.7)
IMM GRANULOCYTES # BLD: 0 10E3/UL
IMM GRANULOCYTES NFR BLD: 0 %
LYMPHOCYTES # BLD AUTO: 3.6 10E3/UL (ref 0.8–5.3)
LYMPHOCYTES NFR BLD AUTO: 35 %
MCH RBC QN AUTO: 30.4 PG (ref 26.5–33)
MCHC RBC AUTO-ENTMCNC: 32.6 G/DL (ref 31.5–36.5)
MCV RBC AUTO: 94 FL (ref 78–100)
MONOCYTES # BLD AUTO: 0.7 10E3/UL (ref 0–1.3)
MONOCYTES NFR BLD AUTO: 7 %
NEUTROPHILS # BLD AUTO: 5.8 10E3/UL (ref 1.6–8.3)
NEUTROPHILS NFR BLD AUTO: 56 %
NRBC # BLD AUTO: 0 10E3/UL
NRBC BLD AUTO-RTO: 0 /100
PLATELET # BLD AUTO: 211 10E3/UL (ref 150–450)
POTASSIUM SERPL-SCNC: 4 MMOL/L (ref 3.4–5.3)
RBC # BLD AUTO: 4.6 10E6/UL (ref 3.8–5.2)
SODIUM SERPL-SCNC: 139 MMOL/L (ref 135–145)
WBC # BLD AUTO: 10.4 10E3/UL (ref 4–11)

## 2024-07-28 PROCEDURE — 99285 EMERGENCY DEPT VISIT HI MDM: CPT | Mod: 25 | Performed by: PHYSICIAN ASSISTANT

## 2024-07-28 PROCEDURE — 80048 BASIC METABOLIC PNL TOTAL CA: CPT | Performed by: PHYSICIAN ASSISTANT

## 2024-07-28 PROCEDURE — 99285 EMERGENCY DEPT VISIT HI MDM: CPT | Performed by: PHYSICIAN ASSISTANT

## 2024-07-28 PROCEDURE — 36415 COLL VENOUS BLD VENIPUNCTURE: CPT | Performed by: PHYSICIAN ASSISTANT

## 2024-07-28 PROCEDURE — 73030 X-RAY EXAM OF SHOULDER: CPT | Mod: TC,RT

## 2024-07-28 PROCEDURE — 250N000011 HC RX IP 250 OP 636: Performed by: PHYSICIAN ASSISTANT

## 2024-07-28 PROCEDURE — 73590 X-RAY EXAM OF LOWER LEG: CPT | Mod: TC,RT

## 2024-07-28 PROCEDURE — 85025 COMPLETE CBC W/AUTO DIFF WBC: CPT | Performed by: PHYSICIAN ASSISTANT

## 2024-07-28 PROCEDURE — 73562 X-RAY EXAM OF KNEE 3: CPT | Mod: TC,RT

## 2024-07-28 PROCEDURE — 96374 THER/PROPH/DIAG INJ IV PUSH: CPT | Performed by: PHYSICIAN ASSISTANT

## 2024-07-28 PROCEDURE — 96376 TX/PRO/DX INJ SAME DRUG ADON: CPT | Performed by: PHYSICIAN ASSISTANT

## 2024-07-28 RX ORDER — FENTANYL CITRATE 50 UG/ML
50 INJECTION, SOLUTION INTRAMUSCULAR; INTRAVENOUS EVERY 30 MIN PRN
Status: DISCONTINUED | OUTPATIENT
Start: 2024-07-28 | End: 2024-07-29

## 2024-07-28 RX ORDER — FENTANYL CITRATE 50 UG/ML
25 INJECTION, SOLUTION INTRAMUSCULAR; INTRAVENOUS EVERY 30 MIN PRN
Status: DISCONTINUED | OUTPATIENT
Start: 2024-07-28 | End: 2024-07-28

## 2024-07-28 RX ADMIN — FENTANYL CITRATE 50 MCG: 50 INJECTION INTRAMUSCULAR; INTRAVENOUS at 23:16

## 2024-07-28 RX ADMIN — FENTANYL CITRATE 25 MCG: 50 INJECTION INTRAMUSCULAR; INTRAVENOUS at 21:55

## 2024-07-28 RX ADMIN — FENTANYL CITRATE 25 MCG: 50 INJECTION INTRAMUSCULAR; INTRAVENOUS at 22:30

## 2024-07-28 ASSESSMENT — COLUMBIA-SUICIDE SEVERITY RATING SCALE - C-SSRS
1. IN THE PAST MONTH, HAVE YOU WISHED YOU WERE DEAD OR WISHED YOU COULD GO TO SLEEP AND NOT WAKE UP?: NO
2. HAVE YOU ACTUALLY HAD ANY THOUGHTS OF KILLING YOURSELF IN THE PAST MONTH?: NO
6. HAVE YOU EVER DONE ANYTHING, STARTED TO DO ANYTHING, OR PREPARED TO DO ANYTHING TO END YOUR LIFE?: NO

## 2024-07-28 ASSESSMENT — ACTIVITIES OF DAILY LIVING (ADL)
ADLS_ACUITY_SCORE: 35
ADLS_ACUITY_SCORE: 35

## 2024-07-29 VITALS
OXYGEN SATURATION: 94 % | TEMPERATURE: 98.6 F | HEART RATE: 68 BPM | RESPIRATION RATE: 18 BRPM | DIASTOLIC BLOOD PRESSURE: 72 MMHG | WEIGHT: 226.5 LBS | HEIGHT: 63 IN | BODY MASS INDEX: 40.13 KG/M2 | SYSTOLIC BLOOD PRESSURE: 133 MMHG

## 2024-07-29 PROBLEM — S42.291A HUMERAL HEAD FRACTURE, RIGHT, CLOSED, INITIAL ENCOUNTER: Status: ACTIVE | Noted: 2024-07-29

## 2024-07-29 PROBLEM — S42.309A HUMERUS FRACTURE: Status: ACTIVE | Noted: 2024-07-29

## 2024-07-29 PROBLEM — E78.5 HYPERLIPIDEMIA LDL GOAL <70: Status: ACTIVE | Noted: 2018-02-08

## 2024-07-29 PROCEDURE — 99235 HOSP IP/OBS SAME DATE MOD 70: CPT | Performed by: INTERNAL MEDICINE

## 2024-07-29 PROCEDURE — 250N000013 HC RX MED GY IP 250 OP 250 PS 637: Performed by: INTERNAL MEDICINE

## 2024-07-29 PROCEDURE — G0378 HOSPITAL OBSERVATION PER HR: HCPCS

## 2024-07-29 PROCEDURE — 250N000011 HC RX IP 250 OP 636: Performed by: PHYSICIAN ASSISTANT

## 2024-07-29 PROCEDURE — 96376 TX/PRO/DX INJ SAME DRUG ADON: CPT | Performed by: PHYSICIAN ASSISTANT

## 2024-07-29 PROCEDURE — 96375 TX/PRO/DX INJ NEW DRUG ADDON: CPT

## 2024-07-29 PROCEDURE — 250N000011 HC RX IP 250 OP 636: Performed by: INTERNAL MEDICINE

## 2024-07-29 RX ORDER — HYDROCODONE BITARTRATE AND ACETAMINOPHEN 5; 325 MG/1; MG/1
1-2 TABLET ORAL EVERY 4 HOURS PRN
Status: DISCONTINUED | OUTPATIENT
Start: 2024-07-29 | End: 2024-07-29

## 2024-07-29 RX ORDER — ONDANSETRON 4 MG/1
4 TABLET, ORALLY DISINTEGRATING ORAL EVERY 6 HOURS PRN
Status: DISCONTINUED | OUTPATIENT
Start: 2024-07-29 | End: 2024-07-29 | Stop reason: HOSPADM

## 2024-07-29 RX ORDER — ONDANSETRON 4 MG/1
4 TABLET, ORALLY DISINTEGRATING ORAL EVERY 6 HOURS PRN
Status: DISCONTINUED | OUTPATIENT
Start: 2024-07-29 | End: 2024-07-29

## 2024-07-29 RX ORDER — CALCIUM CARBONATE 500 MG/1
1000 TABLET, CHEWABLE ORAL 4 TIMES DAILY PRN
Status: DISCONTINUED | OUTPATIENT
Start: 2024-07-29 | End: 2024-07-29 | Stop reason: HOSPADM

## 2024-07-29 RX ORDER — ONDANSETRON 2 MG/ML
4 INJECTION INTRAMUSCULAR; INTRAVENOUS EVERY 6 HOURS PRN
Status: DISCONTINUED | OUTPATIENT
Start: 2024-07-29 | End: 2024-07-29 | Stop reason: HOSPADM

## 2024-07-29 RX ORDER — FLUTICASONE PROPIONATE 50 MCG
2 SPRAY, SUSPENSION (ML) NASAL DAILY
Status: DISCONTINUED | OUTPATIENT
Start: 2024-07-29 | End: 2024-07-29

## 2024-07-29 RX ORDER — IBUPROFEN 200 MG
200-400 TABLET ORAL EVERY 8 HOURS PRN
COMMUNITY

## 2024-07-29 RX ORDER — OXYCODONE HYDROCHLORIDE 5 MG/1
5 TABLET ORAL EVERY 4 HOURS PRN
Status: DISCONTINUED | OUTPATIENT
Start: 2024-07-29 | End: 2024-07-29 | Stop reason: HOSPADM

## 2024-07-29 RX ORDER — LEVOTHYROXINE SODIUM 100 UG/1
100 TABLET ORAL DAILY
Status: DISCONTINUED | OUTPATIENT
Start: 2024-07-29 | End: 2024-07-29 | Stop reason: HOSPADM

## 2024-07-29 RX ORDER — ROSUVASTATIN CALCIUM 5 MG/1
5 TABLET, COATED ORAL DAILY
Status: DISCONTINUED | OUTPATIENT
Start: 2024-07-29 | End: 2024-07-29 | Stop reason: HOSPADM

## 2024-07-29 RX ORDER — NALOXONE HYDROCHLORIDE 0.4 MG/ML
0.2 INJECTION, SOLUTION INTRAMUSCULAR; INTRAVENOUS; SUBCUTANEOUS
Status: DISCONTINUED | OUTPATIENT
Start: 2024-07-29 | End: 2024-07-29 | Stop reason: HOSPADM

## 2024-07-29 RX ORDER — AMOXICILLIN 250 MG
2 CAPSULE ORAL 2 TIMES DAILY PRN
Status: DISCONTINUED | OUTPATIENT
Start: 2024-07-29 | End: 2024-07-29 | Stop reason: HOSPADM

## 2024-07-29 RX ORDER — ACETAMINOPHEN 325 MG/1
650 TABLET ORAL EVERY 4 HOURS PRN
Status: DISCONTINUED | OUTPATIENT
Start: 2024-07-29 | End: 2024-07-29 | Stop reason: HOSPADM

## 2024-07-29 RX ORDER — NALOXONE HYDROCHLORIDE 0.4 MG/ML
0.4 INJECTION, SOLUTION INTRAMUSCULAR; INTRAVENOUS; SUBCUTANEOUS
Status: DISCONTINUED | OUTPATIENT
Start: 2024-07-29 | End: 2024-07-29 | Stop reason: HOSPADM

## 2024-07-29 RX ORDER — LANOLIN ALCOHOL/MO/W.PET/CERES
3 CREAM (GRAM) TOPICAL
Status: DISCONTINUED | OUTPATIENT
Start: 2024-07-29 | End: 2024-07-29 | Stop reason: HOSPADM

## 2024-07-29 RX ORDER — OXYCODONE HYDROCHLORIDE 5 MG/1
5 TABLET ORAL EVERY 4 HOURS PRN
Qty: 30 TABLET | Refills: 0 | Status: SHIPPED | OUTPATIENT
Start: 2024-07-29

## 2024-07-29 RX ORDER — MELATONIN 3 MG
1.5 TABLET ORAL
Status: DISCONTINUED | OUTPATIENT
Start: 2024-07-29 | End: 2024-07-29

## 2024-07-29 RX ORDER — ONDANSETRON 2 MG/ML
4 INJECTION INTRAMUSCULAR; INTRAVENOUS EVERY 6 HOURS PRN
Status: DISCONTINUED | OUTPATIENT
Start: 2024-07-29 | End: 2024-07-29

## 2024-07-29 RX ORDER — AMOXICILLIN 250 MG
2 CAPSULE ORAL 2 TIMES DAILY PRN
Qty: 60 TABLET | Refills: 0 | Status: SHIPPED | OUTPATIENT
Start: 2024-07-29

## 2024-07-29 RX ORDER — CETIRIZINE HYDROCHLORIDE 10 MG/1
10 TABLET ORAL DAILY
Status: DISCONTINUED | OUTPATIENT
Start: 2024-07-29 | End: 2024-07-29

## 2024-07-29 RX ORDER — AMOXICILLIN 250 MG
1 CAPSULE ORAL 2 TIMES DAILY PRN
Status: DISCONTINUED | OUTPATIENT
Start: 2024-07-29 | End: 2024-07-29 | Stop reason: HOSPADM

## 2024-07-29 RX ORDER — ACETAMINOPHEN 500 MG
1000 TABLET ORAL EVERY 6 HOURS PRN
COMMUNITY
Start: 2024-07-29

## 2024-07-29 RX ORDER — KETOROLAC TROMETHAMINE 15 MG/ML
15 INJECTION, SOLUTION INTRAMUSCULAR; INTRAVENOUS EVERY 6 HOURS PRN
Status: DISCONTINUED | OUTPATIENT
Start: 2024-07-29 | End: 2024-07-29 | Stop reason: HOSPADM

## 2024-07-29 RX ORDER — LISINOPRIL 20 MG/1
20 TABLET ORAL DAILY
Status: DISCONTINUED | OUTPATIENT
Start: 2024-07-29 | End: 2024-07-29 | Stop reason: HOSPADM

## 2024-07-29 RX ORDER — FLUTICASONE PROPIONATE 50 MCG
1 SPRAY, SUSPENSION (ML) NASAL DAILY PRN
COMMUNITY

## 2024-07-29 RX ADMIN — HYDROCODONE BITARTRATE AND ACETAMINOPHEN 1 TABLET: 5; 325 TABLET ORAL at 01:33

## 2024-07-29 RX ADMIN — LEVOTHYROXINE SODIUM 100 MCG: 0.1 TABLET ORAL at 10:46

## 2024-07-29 RX ADMIN — OXYCODONE HYDROCHLORIDE 5 MG: 5 TABLET ORAL at 12:05

## 2024-07-29 RX ADMIN — ROSUVASTATIN CALCIUM 5 MG: 5 TABLET, FILM COATED ORAL at 10:46

## 2024-07-29 RX ADMIN — LISINOPRIL 20 MG: 20 TABLET ORAL at 10:46

## 2024-07-29 RX ADMIN — HYDROCODONE BITARTRATE AND ACETAMINOPHEN 1 TABLET: 5; 325 TABLET ORAL at 09:27

## 2024-07-29 RX ADMIN — KETOROLAC TROMETHAMINE 15 MG: 15 INJECTION, SOLUTION INTRAMUSCULAR; INTRAVENOUS at 04:50

## 2024-07-29 RX ADMIN — FENTANYL CITRATE 50 MCG: 50 INJECTION INTRAMUSCULAR; INTRAVENOUS at 00:15

## 2024-07-29 ASSESSMENT — ACTIVITIES OF DAILY LIVING (ADL)
ADLS_ACUITY_SCORE: 20
ADLS_ACUITY_SCORE: 23
ADLS_ACUITY_SCORE: 20
ADLS_ACUITY_SCORE: 23

## 2024-07-29 ASSESSMENT — ENCOUNTER SYMPTOMS
COUGH: 0
BACK PAIN: 0
SHORTNESS OF BREATH: 0
ABDOMINAL PAIN: 0

## 2024-07-29 NOTE — PHARMACY-ADMISSION MEDICATION HISTORY
Pharmacist Admission Medication History    Admission medication history is complete. The information provided in this note is only as accurate as the sources available at the time of the update.    Information Source(s): Patient and CareEverywhere/SureScripts via in-person    Pertinent Information: Patient was very good historian and could speak to medications without prompting. Uses ibuprofen 1-2 tabs daily for inflammatory pain in shoulder. Attests to still having rescue inhaler but admits it is likely  and would need a new one. Rarely ever need Metronidazole gel but requests it be left on medlist.     Changes made to PTA medication list:  Added: Ibuprofen   Deleted: Budesonide - Not using, VitD - duplicate  Changed: VitD - added strength, Flonase - PRN, Metronidazole - PRN,     Allergies reviewed with patient: yes    Medication History Completed By: Tj Santos [facility] 2024 8:27 AM    PTA Med List   Medication Sig Last Dose    albuterol (PROAIR HFA/PROVENTIL HFA/VENTOLIN HFA) 108 (90 Base) MCG/ACT inhaler Inhale 2 puffs into the lungs every 4 hours as needed for shortness of breath / dyspnea or wheezing Inhale 1-2 puff More than a month    cetirizine (ZYRTEC) 10 MG tablet Take 10 mg by mouth daily 2024 at AM    fluticasone (FLONASE) 50 MCG/ACT nasal spray Spray 1 spray into both nostrils daily as needed for allergies Past Month    ibuprofen (ADVIL/MOTRIN) 200 MG tablet Take 200-400 mg by mouth every 8 hours as needed for pain Past Week    levothyroxine (SYNTHROID/LEVOTHROID) 100 MCG tablet Take 1 tablet (100 mcg) by mouth daily 2024 at AM    lisinopril (ZESTRIL) 20 MG tablet Take 1 tablet (20 mg) by mouth daily 2024 at AM    metroNIDAZOLE (METROGEL) 0.75 % external gel Apply topically 2 times daily as needed (rosacea) More than a month    Multiple Vitamin (MULTIVITAMIN) per tablet Take 1 tablet by mouth daily. Food 2024 at AM    rosuvastatin (CRESTOR) 5 MG tablet Take 1 tablet (5  mg) by mouth daily 7/28/2024 at PM    Vitamin D3 (CHOLECALCIFEROL) 25 mcg (1000 units) tablet Take 25 mcg by mouth daily 7/28/2024 at AM

## 2024-07-29 NOTE — ED TRIAGE NOTES
Patient was going up basement steps and lost her balance and fell onto right shoulder. Patient denies hitting her head.

## 2024-07-29 NOTE — H&P
HOSPITALIST TELEMED ADMISSION H&P    Service Date : 7/29/2024  Dr. Channing DARBY, am located in [place].   Laura Romero is located in [place] at [facility].   The RN or tech on duty in the ED is assisting me today with this patient.    chief complaint: R shoulder pain, fall     History of Present Illness:  Laura Romero is a 69 year old female with h/o HTN, HL, asthma, obesity, hypothyroidism, referred for observation for humerus fracture. She was walking up some stairs, carrying a fan, and talking on the phone at the same time. She lost her balance and fell down the stairs. She did not have a free hand to hold onto the railing and she thinks that is why she fell. She landed on her R shoulder and had immediate pain.     Emergency Room Course -  in the ER, patient was afebrile with stable vital signs.  BMP and CBC were normal.  X-ray of the right shoulder showed comminuted acute fracture of the humeral neck and tuberosities, probable severely subluxed even displaced humeral head anteriorly.  Knee x-ray and foot x-ray were negative.  ER physician discussed the case with orthopedics who said patient can be discharged for outpatient follow up but they felt she needed to stay overnight observation for pain control.  Ortho is aware and is going to see her in the morning but they are not doing surgery here tomorrow and are planning outpatient surgery Tuesday at the outpatient surgery center.  In the ER she was given multiple doses of fentanyl and finally pain was controlled    Past Medical History  Past Medical History:   Diagnosis Date    Bacteremia      12/04,Negative coag testing    Calculus of kidney     age 19 and age 50    Chronic sinusitis     5/20/2011    Deviated nasal septum     8/3/2012    Deviated nasal septum     sever turbinate hypertophy and superior septal deviation to right with recurrent sinusitis    Gastro-esophageal reflux disease without esophagitis     8/31/2009    Personal history of other medical  treatment (CODE)     rectocele with cervical prolapse    Phlebitis and thrombophlebitis     1/6/11, after varicose vein ablation    Phlebitis and thrombophlebitis of lower extremities, unspecified (CODE)     1/6/2011,Greater saphenous vein left lower extremity    Pneumonia     7/27/2012      Patient Active Problem List   Diagnosis    S/P nasal septoplasty    Perennial allergic rhinitis    ACP (advance care planning)    Allergic rhinitis    HTN (hypertension)    Hyperlipidemia LDL goal <70    Hypothyroidism    Pain in joint, lower leg    Nephrolithiasis    Obesity    Rosacea    Asymptomatic varicose veins    Mild intermittent extrinsic asthma without complication    H/O adenomatous polyp of colon    Mixed hyperlipidemia    Primary hypertension    Chest heaviness    Abnormal cardiovascular stress test    S/P coronary angiogram    Family history of premature CAD    Class 2 severe obesity with serious comorbidity and body mass index (BMI) of 36.0 to 36.9 in adult, unspecified obesity type (H)    Arthritis of right ankle    Humeral head fracture, right, closed, initial encounter    Humerus fracture         Past Surgical History  Past Surgical History:   Procedure Laterality Date    CATARACT EXTRACTION      left eye 1/10/2023 - Dr. Orellana,  right eye planned around 1/24/23.    COLONOSCOPY  10/13/2008    normal.  Next colonoscopy due in 2018.    COLONOSCOPY N/A 10/26/2020    F/U 2025 tubular adenoma    CV CORONARY ANGIOGRAM N/A 9/5/2023    Procedure: Coronary Angiogram;  Surgeon: Khai Adler MD;  Location:  [facility]    HYSTERECTOMY VAGINAL  04/27/2005    anterior repair with posterior perineorrhaphy and vaginal vault suspension    IR URETERAL STENT REMOVAL RIGHT  08/11/2005    ureteral Stent removal, uncertain laterality    LAPAROSCOPIC TUBAL LIGATION      No Comments Provided    OTHER SURGICAL HISTORY      ,LITHOTRIPSY, with stent placement for right ureteral stone    SEPTOPLASTY  08/2012    with  turbinate reduction;  Dr. England    TONSILLECTOMY      No Comments Provided      Social History  Laura  reports that she has never smoked. She has never used smokeless tobacco. She reports current alcohol use. She reports that she does not use drugs.    Family History  Laura's family history includes Arthritis in her father; Heart Disease in her father and maternal grandfather; Heart Disease (age of onset: 49) in her mother; Hypertension in her brother, brother, brother, father, mother, sister, and sister; Other - See Comments in her daughter, maternal grandmother, paternal grandfather, paternal grandmother, and another family member.    Home Medications  Current Outpatient Medications   Medication Instructions    albuterol (PROAIR HFA/PROVENTIL HFA/VENTOLIN HFA) 108 (90 Base) MCG/ACT inhaler 2 puffs, Inhalation, EVERY 4 HOURS PRN, Inhale 1-2 puff     budesonide (PULMICORT) 0.5 MG/2ML neb solution SPRAY 2MLS IN NOSTRIL TWICE DAILY MAKE 240CC MICHAEL MED SINUS IRRI. MIX 2ML VIAL OF BUDESONIDE 0.5MG RINSE 1-2 X DAILY AS NEEDED X 2-4 WEEKS    cetirizine (ZYRTEC) 10 mg, Oral, DAILY    Cholecalciferol (VITAMIN D PO) 1 tablet, Oral, DAILY    cholecalciferol (VITAMIN D3) 10 mcg (400 units) TABS tablet 1 tablet, Oral, DAILY    fluticasone (FLONASE) 50 MCG/ACT nasal spray SHAKE LIQUID AND USE 2 SPRAYS IN EACH NOSTRIL DAILY    levothyroxine (SYNTHROID/LEVOTHROID) 100 mcg, Oral, DAILY    lisinopril (ZESTRIL) 20 mg, Oral, DAILY    metroNIDAZOLE (METROGEL) 0.75 % external gel Topical, 2 TIMES DAILY    Multiple Vitamin (MULTIVITAMIN) per tablet 1 tablet, DAILY    rosuvastatin (CRESTOR) 5 mg, Oral, DAILY       Allergies  Allergies   Allergen Reactions    Azithromycin Hives     Zithromax          10 pt ROS neg except as noted in HPI    Physical Exam:  I performed all aspects of the physical examination via Telemedicine associated with two way audio and video communication.    Vital Signs: Blood pressure 120/65, pulse 83,  "temperature 98.8  F (37.1  C), temperature source Temporal, resp. rate 16, height 1.6 m (5' 3\"), weight 102.7 kg (226 lb 8 oz), last menstrual period 01/01/2005, SpO2 93%, not currently breastfeeding.    Physical Exam    Gen:  obese female, in no acute distress, lying semi-supine in hospital stretcher  HEENT:  Anicteric sclera, PER, hearing intact to voice  Resp:  No accessory muscle use, breath sounds clear; no wheezes no rales no rhonchi  Card:  No murmur, normal S1, S2   Abd:  Soft per RN exam, no TTP, non-distended, normoactive bowel sounds are present  Musc:  R arm in sling  Psych:  Good insight, oriented to person, place and time, not anxious, not agitated    DATA  Labs:  I have personally reviewed the following studies:  Recent Labs   Lab 07/28/24  2155   POTASSIUM 4.0   CHLORIDE 103   CO2 24   BUN 22.2      Recent Labs   Lab 07/28/24  2155   WBC 10.4   HGB 14.0   HCT 43.0          Imaging: ER imaging reviewed    ASSESSMENT/PLAN:     R humerus fracture:  place in observation status.  Will order p.r.n. McFall for pain and hopefully that will work for her for going home.  Ortho is going to see her in a.m..  She has not NPO since they are not doing surgery in the a.m..    2. HTN :   continue home lisinopril    3. HL: continue home Crestor    4. Hypothyroidism: Continue home Synthroid      Clinically Significant Risk Factors Present on Admission                  # Hypertension: Noted on problem list         # Severe Obesity: Estimated body mass index is 40.12 kg/m  as calculated from the following:    Height as of this encounter: 1.6 m (5' 3\").    Weight as of this encounter: 102.7 kg (226 lb 8 oz).       # Asthma: noted on problem list          Misc  DVT prophylaxis: SCDs  Code Status: Full code     The plan was discussed with - Patient  Time: 30 min    "

## 2024-07-29 NOTE — PROGRESS NOTES
"[facility] ADMISSION NOTE    Patient admitted to room 333 at approximately 0030 via wheel chair from emergency room. Patient was accompanied by spouse.     Verbal SBAR report received from Delfina prior to patient arrival.     Patient ambulated to bed with stand-by assist. Patient alert and oriented X 3. Pain is controlled with current analgesics.  Medication(s) being used: narcotic analgesics including Fentanyl  . Admission vital signs: Blood pressure 120/65, pulse 83, temperature 98.8  F (37.1  C), temperature source Temporal, resp. rate 16, height 1.6 m (5' 3\"), weight 102.7 kg (226 lb 8 oz), last menstrual period 01/01/2005, SpO2 93%, not currently breastfeeding. Patient was oriented to plan of care, call light, bed controls, tv, telephone, bathroom, and visiting hours.     Risk Assessment    The following safety risks were identified during admission: fall. Yellow risk band applied: YES.            Education    Patient has a [place] to Observation order: Yes  Observation education completed and documented: Yes      Ivonne Myers RN      "

## 2024-07-29 NOTE — PLAN OF CARE
PRIMARY DIAGNOSIS: Humeral head fracture, right  OUTPATIENT/OBSERVATION GOALS TO BE MET BEFORE DISCHARGE:  ADLs back to baseline: No    Activity and level of assistance: Up with standby assistance.    Pain status: Improved-controlled with oral pain medications.    Return to near baseline physical activity: No     Discharge Planner Nurse   Safe discharge environment identified: Yes  Barriers to discharge: Yes       Entered by: Ivonne Myers RN 07/29/2024 4:59 AM     Please review provider order for any additional goals.   Nurse to notify provider when observation goals have been met and patient is ready for discharge.Goal Outcome Evaluation:             Pt rating pain 5-8/10 controlled with [place] and IV Torodol. Alert and oriented x4, stand by assist, VS stable. Small abrasion on rt knee and brusing on rt shin from fall. Rt arm in sling.

## 2024-07-29 NOTE — PROGRESS NOTES
NSG DISCHARGE NOTE    Patient discharged to home at 2:02 PM via wheel chair. Accompanied by spouse and staff. Discharge instructions reviewed with patient and spouse, opportunity offered to ask questions. Prescriptions sent to patients preferred pharmacy. All belongings sent with patient.    Sourav Cruz RN

## 2024-07-29 NOTE — PROVIDER NOTIFICATION
07/29/24 0040   Initial Information   Patient Belongings remains with patient   Patient Belongings Remaining with Patient cell phone/electronics;clothing;vision aids   Did you bring any home meds/supplements to the hospital?  No       A               Admission:  I am responsible for any personal items that are not sent to the safe or pharmacy.  [facility] is not responsible for loss, theft or damage of any property in my possession.    Signature:  _________________________________ Date: _______  Time: _____                                              Staff Signature:  ____________________________ Date: ________  Time: _____      2nd Staff person, if patient is unable/unwilling to sign:    Signature: ________________________________ Date: ________  Time: _____     Discharge:  [facility] has returned all of my personal belongings:    Signature: _________________________________ Date: ________  Time: _____                                          Staff Signature:  ____________________________ Date: ________  Time: _____

## 2024-07-29 NOTE — DISCHARGE SUMMARY
"[facility]  Hospitalist Discharge Summary      Date of Admission:  7/28/2024  Date of Discharge:  7/29/2024  Discharging Provider: Tani Juan MD  Discharge Service: Hospitalist Service    Discharge Diagnoses   Principal Problem:    Humeral head fracture, right, closed, initial encounter  Active Problems:    HTN (hypertension)    Hyperlipidemia LDL goal <70    Hypothyroidism       Clinically Significant Risk Factors     # Severe Obesity: Estimated body mass index is 40.12 kg/m  as calculated from the following:    Height as of this encounter: 1.6 m (5' 3\").    Weight as of this encounter: 102.7 kg (226 lb 8 oz).       Follow-ups Needed After Discharge   Follow-up Appointments     Follow-up and recommended labs and tests       [facility] tomorrow            Discharge Disposition   Discharged to home  Condition at discharge: Stable    Hospital Course   Patient fell down stars at home and had a comminuted fracture of humerus head on right. Observation for severe pain. Improved with oxycodone and managed with oral meds by midday on 7/29. She was seen by Dr. Hairston and will have surgery at [facility] tomorrow. This has been scheduled. Home on oxycodone today.        Consultations This Hospital Stay   ORTHOPEDIC SURGERY IP CONSULT    Code Status   Full Code    Time Spent on this Encounter   I, Tani Juan MD, personally saw the patient today and spent greater than 30 minutes discharging this patient.       Tani Juan MD  [facility] [place]  [address]  [place] [address]  Phone: [phone]  Fax: [phone]  ______________________________________________________________________    Physical Exam   Vital Signs: Temp: 98.6  F (37  C) Temp src: Tympanic BP: 133/72 Pulse: 68   Resp: 18 SpO2: 94 % O2 Device: None (Room air)    Weight: 226 lbs 8 oz  GENERAL: Comfortable, no apparent distress.  CARDIOVASCULAR: regular rate and rhythm, no " murmur. No lower extremity edema   RESPIRATORY: Clear to auscultation bilaterally, no wheezes or crackles.  GI: non-tender, non-distended, normal bowel sounds.   SKIN: warm periphery, no rashes         Primary Care Physician   Gloria Mcnally    Discharge Orders      Reason for your hospital stay    Comminuted humerus fracture     Follow-up and recommended labs and tests     [facility] tomorrow     Activity    Your activity upon discharge: activity as tolerated     Diet    Regular diet, No food after midnight unless directed otherwise by [facility]       Significant Results and Procedures   Most Recent 3 CBC's:  Recent Labs   Lab Test 07/28/24 2155 02/27/24  1148 09/05/23  0655   WBC 10.4 6.6 5.8   HGB 14.0 14.6 14.6   MCV 94 91 93    221 210     Most Recent 3 BMP's:  Recent Labs   Lab Test 07/28/24 2155 02/27/24  1148 09/05/23  0655    142 142   POTASSIUM 4.0 4.1 4.0   CHLORIDE 103 105 105   CO2 24 27 27   BUN 22.2 17.2 19.5   CR 0.58 0.62 0.63   ANIONGAP 12 10 10   MACIE 8.8 9.2 8.9   * 91 98     Most Recent 2 LFT's:  Recent Labs   Lab Test 02/27/24 1148 05/16/23  1128   AST 19 21   ALT 18 21   ALKPHOS 80 74   BILITOTAL 0.5 0.5   ,   Results for orders placed or performed during the hospital encounter of 07/28/24   XR Shoulder Right Port G/E 2 Views    Narrative    PROCEDURE INFORMATION:   Exam: XR Right Shoulder   Exam date and time: 7/28/2024 10:18 PM   Age: 69 years old   Clinical indication: Injury or trauma; Fall; Other: Fracture; Additional info:   Fall, right shoulder pain     TECHNIQUE:   Imaging protocol: Radiologic exam of the right shoulder.   Views: 2 or more views.     COMPARISON:   No relevant prior studies available.     FINDINGS:   Bones/joints: Comminuted acute fracture of the humeral neck with extension into   the tuberosities. The humeral shaft is displaced anteriorly. Humeral head is   rotated and is also probably severely subluxed or dislocated anteriorly.   Soft tissues:  No radiopaque foreign body.       Impression    IMPRESSION:   1.   Comminuted acute fracture of the humeral neck and tuberosities.   2.   Probable severely subluxed or dislocated humeral head anteriorly. The   humeral shaft is located even more anteriorly than the humeral head.     THIS DOCUMENT HAS BEEN ELECTRONICALLY SIGNED BY EVON LOPEZ MD   XR Tibia & Fibula Port Right 2 Views    Narrative    PROCEDURE INFORMATION:   Exam: XR Right Tibia and Fibula   Exam date and time: 7/28/2024 10:02 PM   Age: 69 years old   Clinical indication: Pain; Knee and lower leg; Right; Additional info: Fall   right knee and shin pain, hematoma     TECHNIQUE:   Imaging protocol: Radiologic exam of the right tibia and fibula.   Views: 2 views.     COMPARISON:   No relevant prior studies available.     FINDINGS:   Bones/joints: No acute fracture. No dislocation. Degenerative changes of the   knee. This is better appreciated on the dedicated knee series. There is also   somewhat prominent degenerative changes of the tibiotalar joint.   Soft tissues: No radiopaque foreign body within the soft tissues.       Impression    IMPRESSION:   1.   No acute fracture.   2.   Degenerative changes of the knee and tibiotalar joint.     THIS DOCUMENT HAS BEEN ELECTRONICALLY SIGNED BY EVON LOPEZ MD   XR Knee Port Right 3 Views    Narrative    PROCEDURE INFORMATION:   Exam: XR Right Knee   Exam date and time: 7/28/2024 10:11 PM   Age: 69 years old   Clinical indication: Pain; Knee; Right; Additional info: Fall right knee and   shin pain, hematoma     TECHNIQUE:   Imaging protocol: Radiologic exam of the right knee.   Views: 3 views.     COMPARISON:   CR (Leg, LEG, [id number]-MYT-SR-PORT-2V) 7/28/2024 10:02 PM     FINDINGS:   Bones/joints: No acute fracture. No dislocation. Mild lateral subluxation of   the patella. Prominent degenerative changes of the lateral and patellar   compartment. Mild degenerative changes of the medial compartment. No obvious   joint  effusion.   Soft tissues: No radiopaque foreign body within the soft tissues.       Impression    IMPRESSION:   1.   No acute fracture.   2.   Degenerative changes of the knee.     THIS DOCUMENT HAS BEEN ELECTRONICALLY SIGNED BY EVON LOPEZ MD       Discharge Medications   Current Discharge Medication List        START taking these medications    Details   acetaminophen (TYLENOL) 500 MG tablet Take 2 tablets (1,000 mg) by mouth every 6 hours as needed for mild pain    Associated Diagnoses: Closed displaced fracture of surgical neck of right humerus, unspecified fracture morphology, initial encounter      oxyCODONE (ROXICODONE) 5 MG tablet Take 1 tablet (5 mg) by mouth every 4 hours as needed for severe pain  Qty: 30 tablet, Refills: 0    Associated Diagnoses: Closed displaced fracture of surgical neck of right humerus, unspecified fracture morphology, initial encounter      senna-docusate (SENOKOT-S/PERICOLACE) 8.6-50 MG tablet Take 2 tablets by mouth 2 times daily as needed for constipation  Qty: 60 tablet, Refills: 0    Associated Diagnoses: Closed displaced fracture of surgical neck of right humerus, unspecified fracture morphology, initial encounter           CONTINUE these medications which have NOT CHANGED    Details   albuterol (PROAIR HFA/PROVENTIL HFA/VENTOLIN HFA) 108 (90 Base) MCG/ACT inhaler Inhale 2 puffs into the lungs every 4 hours as needed for shortness of breath / dyspnea or wheezing Inhale 1-2 puff  Qty: 18 g, Refills: 11    Comments: Pharmacy may dispense brand covered by insurance (Proair, or proventil or ventolin or generic albuterol inhaler)  Associated Diagnoses: Perennial allergic rhinitis      cetirizine (ZYRTEC) 10 MG tablet Take 10 mg by mouth daily      fluticasone (FLONASE) 50 MCG/ACT nasal spray Spray 1 spray into both nostrils daily as needed for allergies      ibuprofen (ADVIL/MOTRIN) 200 MG tablet Take 200-400 mg by mouth every 8 hours as needed for pain      levothyroxine  (SYNTHROID/LEVOTHROID) 100 MCG tablet Take 1 tablet (100 mcg) by mouth daily  Qty: 90 tablet, Refills: 3    Associated Diagnoses: Hypothyroidism, unspecified type      lisinopril (ZESTRIL) 20 MG tablet Take 1 tablet (20 mg) by mouth daily  Qty: 90 tablet, Refills: 3    Associated Diagnoses: Essential hypertension      metroNIDAZOLE (METROGEL) 0.75 % external gel Apply topically 2 times daily as needed (rosacea)      Multiple Vitamin (MULTIVITAMIN) per tablet Take 1 tablet by mouth daily. Food      rosuvastatin (CRESTOR) 5 MG tablet Take 1 tablet (5 mg) by mouth daily  Qty: 90 tablet, Refills: 3    Associated Diagnoses: Hypercholesterolemia      Vitamin D3 (CHOLECALCIFEROL) 25 mcg (1000 units) tablet Take 25 mcg by mouth daily           Allergies   Allergies   Allergen Reactions    Azithromycin Hives     Zithromax

## 2024-07-29 NOTE — PROGRESS NOTES
Nurse report given to MARITZA Croonado on medical. Patient and belongings transferred to room 333.  with.

## 2024-07-29 NOTE — PHARMACY - DISCHARGE MEDICATION RECONCILIATION AND EDUCATION
Pharmacy:  Discharge Counseling and Medication Reconciliation    Laura Romero  [address]  [place] [id number] [phone] (home)   69 year old female  PCP: Gloria Mcnally    Allergies: Azithromycin    Discharge Counseling:    Pharmacist met with patient (and/or family) today to review the medication portion of the After Visit Summary (with an emphasis on NEW medications) and to address patient's questions/concerns.    Summary of Education:  Oxycodone: Discussed PRN use for severe pain, side effects of drowsiness/dizziness and constipation. Discussed abstaining from alcohol and driving while on medication. Reviewed using senna-doc for constipation- also confirmed patient had pre-op med rec done and she has no questions on day of surgery management for medications.  Reviewed using APAP for inbetween doses of oxycodone.      Materials Provided:  MedCounselor sheets printed from Clinical Pharmacology on: Ocyxodone    Discharge Medication Reconciliation:    It has been determined that the patient has an adequate supply of medications available or which can be obtained from the patient's preferred pharmacy, which HE/SHE has confirmed as: [facility]    Thank you for the consult.    Zo Mari [facility]........July 29, 2024 1:24 PM

## 2024-07-29 NOTE — ED PROVIDER NOTES
History     Chief Complaint   Patient presents with    Fall     Patient fell going up basement steps and landed on right shoulder.     HPI  Laura Romero is a 69 year old female who presents to the ED for evaluation of a fall.   Patient was going up basement steps and lost her balance and fell onto right shoulder. Patient denies hitting her head.       Allergies:  Allergies   Allergen Reactions    Azithromycin Hives     Zithromax         Problem List:    Patient Active Problem List    Diagnosis Date Noted    Humeral head fracture, right, closed, initial encounter 07/29/2024     Priority: Medium    Humerus fracture 07/29/2024     Priority: Medium    Arthritis of right ankle 02/27/2024     Priority: Medium    Class 2 severe obesity with serious comorbidity and body mass index (BMI) of 36.0 to 36.9 in adult, unspecified obesity type (H) 01/19/2024     Priority: Medium    Mixed hyperlipidemia 09/05/2023     Priority: Medium    Primary hypertension 09/05/2023     Priority: Medium    Chest heaviness 09/05/2023     Priority: Medium    Abnormal cardiovascular stress test 09/05/2023     Priority: Medium    S/P coronary angiogram 09/05/2023     Priority: Medium    Family history of premature CAD 09/05/2023     Priority: Medium    H/O adenomatous polyp of colon 10/26/2020     Priority: Medium    Mild intermittent extrinsic asthma without complication 04/24/2019     Priority: Medium    Hyperlipidemia LDL goal <70 02/08/2018     Priority: Medium    Obesity 02/08/2018     Priority: Medium     Overview:   Obesity, BMI 41.53;   Has joined exercise program and working on dietary changes       Rosacea 02/08/2018     Priority: Medium    Asymptomatic varicose veins 02/08/2018     Priority: Medium     Overview:   preloader comment: selections available to preload differs from paper chart.    *Varicosities of the left leg.      ACP (advance care planning) 05/12/2016     Priority: Medium     Advance Care Planning 5/12/2016: ACP Review  of Chart / Resources Provided:  Reviewed chart for advance care plan.  Laura Romero has been provided information and resources to begin or update their advance care plan.  Added by Siri Westbrook            S/P nasal septoplasty 03/27/2013     Priority: Medium    Perennial allergic rhinitis 03/27/2013     Priority: Medium    Nephrolithiasis 12/12/2012     Priority: Medium    Allergic rhinitis 02/03/2012     Priority: Medium    Pain in joint, lower leg 05/04/2011     Priority: Medium     Overview:   possible meniscal tear      HTN (hypertension) 10/04/2010     Priority: Medium    Hypothyroidism 10/04/2010     Priority: Medium        Past Medical History:    Past Medical History:   Diagnosis Date    Bacteremia     Calculus of kidney     Chronic sinusitis     Deviated nasal septum     Deviated nasal septum     Gastro-esophageal reflux disease without esophagitis     Personal history of other medical treatment (CODE)     Phlebitis and thrombophlebitis     Phlebitis and thrombophlebitis of lower extremities, unspecified (CODE)     Pneumonia        Past Surgical History:    Past Surgical History:   Procedure Laterality Date    CATARACT EXTRACTION      left eye 1/10/2023 - Dr. Orellana,  right eye planned around 1/24/23.    COLONOSCOPY  10/13/2008    normal.  Next colonoscopy due in 2018.    COLONOSCOPY N/A 10/26/2020    F/U 2025 tubular adenoma    CV CORONARY ANGIOGRAM N/A 9/5/2023    Procedure: Coronary Angiogram;  Surgeon: Khai Adler MD;  Location:  [facility]    HYSTERECTOMY VAGINAL  04/27/2005    anterior repair with posterior perineorrhaphy and vaginal vault suspension    IR URETERAL STENT REMOVAL RIGHT  08/11/2005    ureteral Stent removal, uncertain laterality    LAPAROSCOPIC TUBAL LIGATION      No Comments Provided    OTHER SURGICAL HISTORY      ,LITHOTRIPSY, with stent placement for right ureteral stone    SEPTOPLASTY  08/2012    with turbinate reduction;  Dr. England    TONSILLECTOMY       No Comments Provided       Family History:    Family History   Problem Relation Age of Onset    Heart Disease Mother 49        Heart Disease,MI,  at 69 of a massive MI, she had been a heavy smoker    Hypertension Mother         Hypertension    Arthritis Father         Arthritis,Rheumatoid arthritis    Heart Disease Father         Heart Disease, MI    Hypertension Father         Hypertension    Hypertension Sister     Hypertension Sister         Hypertension    Hypertension Brother     Hypertension Brother         Hypertension    Hypertension Brother     Other - See Comments Maternal Grandmother          of blood clot at childbirth around age 36.    Heart Disease Maternal Grandfather          at 62 of MI    Other - See Comments Paternal Grandmother          after cholecystectomy, very obese    Other - See Comments Paternal Grandfather         had been very anemic prior to death    Other - See Comments Daughter         GI Disease,Ulcerative colitis diagnosed     Other - See Comments Other         Did have a blood clot    Breast Cancer No family hx of         Cancer-breast       Social History:  Marital Status:   [2]  Social History     Tobacco Use    Smoking status: Never    Smokeless tobacco: Never   Vaping Use    Vaping status: Never Used   Substance Use Topics    Alcohol use: Yes     Alcohol/week: 0.0 standard drinks of alcohol     Comment: Alcoholic Drinks/day: infrequent social    Drug use: Never     Comment: Drug use: No        Medications:    No current outpatient medications on file.        Review of Systems   Eyes:  Negative for visual disturbance.   Respiratory:  Negative for cough and shortness of breath.    Cardiovascular:  Negative for chest pain.   Gastrointestinal:  Negative for abdominal pain.   Musculoskeletal:  Negative for back pain.        Right shoulder pain, right knee and shin pain   Neurological:  Negative for syncope.       Physical Exam   BP: 110/75  Pulse:  "75  Temp: (!) 95.8  F (35.4  C)  Resp: 20  Height: 160 cm (5' 3\")  Weight: 102.7 kg (226 lb 8 oz)  SpO2: 99 %      Physical Exam  Constitutional:       General: She is not in acute distress.     Appearance: She is well-developed. She is not diaphoretic.   HENT:      Head: Normocephalic and atraumatic.   Eyes:      General: No scleral icterus.     Conjunctiva/sclera: Conjunctivae normal.   Neck:      Vascular: No carotid bruit.   Cardiovascular:      Rate and Rhythm: Normal rate and regular rhythm.   Pulmonary:      Effort: Pulmonary effort is normal.      Breath sounds: Normal breath sounds.   Abdominal:      Palpations: Abdomen is soft.      Tenderness: There is no abdominal tenderness.   Musculoskeletal:         General: Tenderness present. No deformity.      Cervical back: Neck supple.      Right lower leg: No edema.      Left lower leg: No edema.      Comments: Right shoulder TTP, right knee abrasion, right shin ecchymosis   Skin:     General: Skin is warm and dry.      Coloration: Skin is not jaundiced.      Findings: No rash.   Neurological:      Mental Status: She is alert. Mental status is at baseline.   Psychiatric:         Mood and Affect: Mood normal.         Behavior: Behavior normal.         Thought Content: Thought content normal.         Judgment: Judgment normal.         ED Course        Procedures              Critical Care time:  none               Results for orders placed or performed during the hospital encounter of 07/28/24 (from the past 24 hour(s))   CBC with platelets differential    Narrative    The following orders were created for panel order CBC with platelets differential.  Procedure                               Abnormality         Status                     ---------                               -----------         ------                     CBC with platelets and d...[[id number]]                      Final result                 Please view results for these tests on the individual " orders.   Basic metabolic panel   Result Value Ref Range    Sodium 139 135 - 145 mmol/L    Potassium 4.0 3.4 - 5.3 mmol/L    Chloride 103 98 - 107 mmol/L    Carbon Dioxide (CO2) 24 22 - 29 mmol/L    Anion Gap 12 7 - 15 mmol/L    Urea Nitrogen 22.2 8.0 - 23.0 mg/dL    Creatinine 0.58 0.51 - 0.95 mg/dL    GFR Estimate >90 >60 mL/min/1.73m2    Calcium 8.8 8.8 - 10.4 mg/dL    Glucose 170 (H) 70 - 99 mg/dL   CBC with platelets and differential   Result Value Ref Range    WBC Count 10.4 4.0 - 11.0 10e3/uL    RBC Count 4.60 3.80 - 5.20 10e6/uL    Hemoglobin 14.0 11.7 - 15.7 g/dL    Hematocrit 43.0 35.0 - 47.0 %    MCV 94 78 - 100 fL    MCH 30.4 26.5 - 33.0 pg    MCHC 32.6 31.5 - 36.5 g/dL    RDW 14.2 10.0 - 15.0 %    Platelet Count 211 150 - 450 10e3/uL    % Neutrophils 56 %    % Lymphocytes 35 %    % Monocytes 7 %    % Eosinophils 2 %    % Basophils 1 %    % Immature Granulocytes 0 %    NRBCs per 100 WBC 0 <1 /100    Absolute Neutrophils 5.8 1.6 - 8.3 10e3/uL    Absolute Lymphocytes 3.6 0.8 - 5.3 10e3/uL    Absolute Monocytes 0.7 0.0 - 1.3 10e3/uL    Absolute Eosinophils 0.2 0.0 - 0.7 10e3/uL    Absolute Basophils 0.1 0.0 - 0.2 10e3/uL    Absolute Immature Granulocytes 0.0 <=0.4 10e3/uL    Absolute NRBCs 0.0 10e3/uL       Medications   fentaNYL (PF) (SUBLIMAZE) injection 50 mcg (50 mcg Intravenous $Given 7/29/24 0015)   calcium carbonate (TUMS) chewable tablet 1,000 mg (has no administration in time range)   ondansetron (ZOFRAN ODT) ODT tab 4 mg (has no administration in time range)     Or   ondansetron (ZOFRAN) injection 4 mg (has no administration in time range)   acetaminophen (TYLENOL) tablet 650 mg (has no administration in time range)   naloxone (NARCAN) injection 0.2 mg (has no administration in time range)     Or   naloxone (NARCAN) injection 0.4 mg (has no administration in time range)     Or   naloxone (NARCAN) injection 0.2 mg (has no administration in time range)     Or   naloxone (NARCAN) injection 0.4 mg  (has no administration in time range)   HYDROcodone-acetaminophen (NORCO) 5-325 MG per tablet 1-2 tablet (has no administration in time range)       Assessments & Plan (with Medical Decision Making)   No midline C-spine tenderness to palpation.    ABCs intact.  GCS 15, vital signs stable and she is afebrile.    Secondary survey significant for:  Right shoulder TTP, right knee abrasion, right shin ecchymosis.  She does have good distal CMS of right upper extremity, specifically she is able to flex and extend her right wrist and fingers, I have low suspicion for radial nerve injury.    Official reads of imaging are pending.  However, her right shoulder x-ray shows an obvious humeral head fracture.  I spoke with Dr. Hairston[facility], he recommends placing her in a sling, pain control and he will see her in the clinic tomorrow.  He does say that if her pain is uncontrolled that admitting the patient to the hospital would be reasonable and he can see her tomorrow.  She will need a surgeryfor repair and the plan would be to have that surgery done in [place] in 2 days.  Despite pain medication in the ED she feels her pain has not been well-controlled and she is not comfortable going home.  She is accepted by Dr. Channing gay for further management this evening.,     Mikhail Vazquez PA-C      I have reviewed the findings, diagnosis, plan and need for follow up with the patient.          Current Discharge Medication List          Final diagnoses:   Humeral head fracture, right, closed, initial encounter       7/28/2024   [facility] [place]       Mikhail Vazquez PA  07/29/24 0042

## 2024-07-30 ENCOUNTER — PATIENT OUTREACH (OUTPATIENT)
Dept: FAMILY MEDICINE | Facility: OTHER | Age: 69
End: 2024-07-30
Payer: COMMERCIAL

## 2024-07-30 NOTE — TELEPHONE ENCOUNTER
Patient has orthopedic surgery follow-up.  No TCM call required per policy.  Per discharge patient noted to have surgery today at [facility] with Dr. Hairston.    Anny Das RN on 7/30/2024 at 7:50 AM

## 2024-08-05 ENCOUNTER — OFFICE VISIT (OUTPATIENT)
Dept: ORTHOPEDICS | Facility: OTHER | Age: 69
End: 2024-08-05
Attending: ORTHOPAEDIC SURGERY
Payer: MEDICARE

## 2024-08-05 ENCOUNTER — HOSPITAL ENCOUNTER (OUTPATIENT)
Dept: GENERAL RADIOLOGY | Facility: OTHER | Age: 69
Discharge: HOME | End: 2024-08-05
Attending: ORTHOPAEDIC SURGERY
Payer: MEDICARE

## 2024-08-05 DIAGNOSIS — Z87.81 S/P ORIF (OPEN REDUCTION INTERNAL FIXATION) FRACTURE: ICD-10-CM

## 2024-08-05 DIAGNOSIS — Z98.890 S/P ORIF (OPEN REDUCTION INTERNAL FIXATION) FRACTURE: ICD-10-CM

## 2024-08-05 DIAGNOSIS — Z87.81 S/P ORIF (OPEN REDUCTION INTERNAL FIXATION) FRACTURE: Primary | ICD-10-CM

## 2024-08-05 DIAGNOSIS — Z98.890 S/P ORIF (OPEN REDUCTION INTERNAL FIXATION) FRACTURE: Primary | ICD-10-CM

## 2024-08-05 PROCEDURE — G0463 HOSPITAL OUTPT CLINIC VISIT: HCPCS

## 2024-08-05 PROCEDURE — 99495 TRANSJ CARE MGMT MOD F2F 14D: CPT | Performed by: ORTHOPAEDIC SURGERY

## 2024-08-05 PROCEDURE — 99024 POSTOP FOLLOW-UP VISIT: CPT | Performed by: ORTHOPAEDIC SURGERY

## 2024-08-05 PROCEDURE — 73030 X-RAY EXAM OF SHOULDER: CPT | Mod: RT

## 2024-08-05 NOTE — PROGRESS NOTES
Subjective:    69-year-old female who is now 1 week status post ORIF of right proximal humerus fracture done 7-30-24 at the [facility].  She is actually done really well with this.  No real issues at this point.    Objective:    On examination today is a 69-year-old gentleman in no acute distress.  Very pleasant on examination.  Wound is covered with an Aquacel dressing.  She is otherwise neurovascularly intact    Assessment:    69-year-old female doing very well status post ORIF of the right  proximal humerus fracture.    Plan:    Will see her back in approximately 4 weeks.  She will return to clinic in 1 week for removal of her staples and her Aquacel dressing and for a wound check.

## 2024-08-11 ASSESSMENT — ASTHMA QUESTIONNAIRES
QUESTION_1 LAST FOUR WEEKS HOW MUCH OF THE TIME DID YOUR ASTHMA KEEP YOU FROM GETTING AS MUCH DONE AT WORK, SCHOOL OR AT HOME: NONE OF THE TIME
QUESTION_3 LAST FOUR WEEKS HOW OFTEN DID YOUR ASTHMA SYMPTOMS (WHEEZING, COUGHING, SHORTNESS OF BREATH, CHEST TIGHTNESS OR PAIN) WAKE YOU UP AT NIGHT OR EARLIER THAN USUAL IN THE MORNING: NOT AT ALL
QUESTION_2 LAST FOUR WEEKS HOW OFTEN HAVE YOU HAD SHORTNESS OF BREATH: NOT AT ALL
ACT_TOTALSCORE: 25
QUESTION_4 LAST FOUR WEEKS HOW OFTEN HAVE YOU USED YOUR RESCUE INHALER OR NEBULIZER MEDICATION (SUCH AS ALBUTEROL): NOT AT ALL
QUESTION_5 LAST FOUR WEEKS HOW WOULD YOU RATE YOUR ASTHMA CONTROL: COMPLETELY CONTROLLED
ACT_TOTALSCORE: 25

## 2024-08-12 ENCOUNTER — OFFICE VISIT (OUTPATIENT)
Dept: INTERNAL MEDICINE | Facility: OTHER | Age: 69
End: 2024-08-12
Payer: COMMERCIAL

## 2024-08-12 ENCOUNTER — OFFICE VISIT (OUTPATIENT)
Dept: ORTHOPEDICS | Facility: OTHER | Age: 69
End: 2024-08-12
Attending: ORTHOPAEDIC SURGERY
Payer: MEDICARE

## 2024-08-12 VITALS
DIASTOLIC BLOOD PRESSURE: 74 MMHG | TEMPERATURE: 98.5 F | BODY MASS INDEX: 38.48 KG/M2 | SYSTOLIC BLOOD PRESSURE: 122 MMHG | OXYGEN SATURATION: 96 % | WEIGHT: 217.2 LBS | RESPIRATION RATE: 16 BRPM | HEIGHT: 63 IN | HEART RATE: 84 BPM

## 2024-08-12 DIAGNOSIS — S42.211D CLOSED DISPLACED FRACTURE OF SURGICAL NECK OF RIGHT HUMERUS WITH ROUTINE HEALING, UNSPECIFIED FRACTURE MORPHOLOGY, SUBSEQUENT ENCOUNTER: ICD-10-CM

## 2024-08-12 DIAGNOSIS — Z98.890 S/P ORIF (OPEN REDUCTION INTERNAL FIXATION) FRACTURE: Primary | ICD-10-CM

## 2024-08-12 DIAGNOSIS — Z09 HOSPITAL DISCHARGE FOLLOW-UP: Primary | ICD-10-CM

## 2024-08-12 DIAGNOSIS — Z87.81 S/P ORIF (OPEN REDUCTION INTERNAL FIXATION) FRACTURE: Primary | ICD-10-CM

## 2024-08-12 PROCEDURE — 99207 PR NO CHARGE NURSE ONLY: CPT

## 2024-08-12 PROCEDURE — 99495 TRANSJ CARE MGMT MOD F2F 14D: CPT

## 2024-08-12 PROCEDURE — G0463 HOSPITAL OUTPT CLINIC VISIT: HCPCS

## 2024-08-12 ASSESSMENT — PAIN SCALES - GENERAL: PAINLEVEL: NO PAIN (0)

## 2024-08-12 NOTE — NURSING NOTE
"Chief Complaint   Patient presents with    Hospital F/U     Humeral Head Fracture       Initial /74 (BP Location: Left arm, Patient Position: Chair, Cuff Size: Adult Large)   Pulse 84   Temp 98.5  F (36.9  C) (Temporal)   Resp 16   Ht 1.6 m (5' 3\")   Wt 98.5 kg (217 lb 3.2 oz)   LMP 01/01/2005   SpO2 96%   BMI 38.48 kg/m   Estimated body mass index is 38.48 kg/m  as calculated from the following:    Height as of this encounter: 1.6 m (5' 3\").    Weight as of this encounter: 98.5 kg (217 lb 3.2 oz).      Medication Reconciliation: Complete.       Soco Schmidt LPN on 8/12/2024 at 9:08 AM     "

## 2024-08-12 NOTE — PROGRESS NOTES
Romain Sanchez is a 69 year old, presenting for the following health issues:  Hospital F/U (Humeral Head Fracture)        8/12/2024     9:01 AM   Additional Questions   Roomed by Soco ALEXIS LPN   Accompanied by spouse       ICD-10-CM    1. Hospital discharge follow-up  Z09       2. Closed displaced fracture of surgical neck of right humerus with routine healing, unspecified fracture morphology, subsequent encounter  S42.211D       Patient presents to the clinic today with her  for follow-up after she fell backwards down her stairs at home and sustained a right proximal humerus fracture. She underwent an ORIF with Dr. Hairston on 7/30. Her staples were removed in the clinic today by orthopedic nurse. Incision is well approximated. She continues to have lots of bruising. Pain is tolerable with Tylenol as needed. She is utilizing a sling when ambulating. She also has a large hematoma to her right leg. I recommended light compression, elevation, and ice. She is to continue to follow up with ortho as previously scheduled. She can return to the clinic with any further questions or concerns.     HPI     Hospital Follow-up Visit:    Hospital/Nursing Home/IP Rehab Facility: [facility]  Date of Admission: 7-28-24  Date of Discharge: 7-29-24  Reason(s) for Admission: Right Humeral Head Fracture   Was the patient in the ICU or did the patient experience delirium during hospitalization?  No  Do you have any other stressors you would like to discuss with your provider? No    Problems taking medications regularly:  None  Medication changes since discharge: None  Problems adhering to non-medication therapy:  None    Summary of hospitalization:  [facility] discharge summary reviewed  Diagnostic Tests/Treatments reviewed.  Follow up needed: none  Other Healthcare Providers Involved in Patient s Care:          ortho  Update since discharge: improved.    Plan of care communicated with  "patient and family        Objective    /74 (BP Location: Left arm, Patient Position: Chair, Cuff Size: Adult Large)   Pulse 84   Temp 98.5  F (36.9  C) (Temporal)   Resp 16   Ht 1.6 m (5' 3\")   Wt 98.5 kg (217 lb 3.2 oz)   LMP 01/01/2005   SpO2 96%   BMI 38.48 kg/m    Body mass index is 38.48 kg/m .  Physical Exam  Constitutional:       General: She is not in acute distress.     Appearance: Normal appearance. She is obese. She is not ill-appearing.   HENT:      Head: Normocephalic.   Cardiovascular:      Rate and Rhythm: Normal rate and regular rhythm.      Pulses: Normal pulses.   Musculoskeletal:         General: Swelling, tenderness and signs of injury present.      Comments: Right arm and leg     Skin:     General: Skin is warm and dry.      Capillary Refill: Capillary refill takes less than 2 seconds.      Findings: Bruising (right arm and right leg) present.      Comments: Right shoulder incision with steri-strips. Well approximated. No concern for infection.   Neurological:      Mental Status: She is alert and oriented to person, place, and time.   Psychiatric:         Behavior: Behavior normal.         Signed Electronically by: ALICIA Cm CNP    "

## 2024-08-12 NOTE — PROGRESS NOTES
Patient presents to clinic at 's request for staple removal from her right proximal humerus ORIF done two weeks ago. Incision is clean, dry and intact. No redness or drainage. Sutures were removed without difficulty. Steri strips applied. Patient will follow up as scheduled. Sooner if any problems occur.  Kaylyn Reilly LPN .....................8/12/2024 9:44 AM

## 2024-09-13 DIAGNOSIS — Z98.890 S/P ORIF (OPEN REDUCTION INTERNAL FIXATION) FRACTURE: Primary | ICD-10-CM

## 2024-09-13 DIAGNOSIS — Z87.81 S/P ORIF (OPEN REDUCTION INTERNAL FIXATION) FRACTURE: Primary | ICD-10-CM

## 2024-09-16 ENCOUNTER — HOSPITAL ENCOUNTER (OUTPATIENT)
Dept: GENERAL RADIOLOGY | Facility: OTHER | Age: 69
Discharge: HOME | End: 2024-09-16
Attending: ORTHOPAEDIC SURGERY
Payer: MEDICARE

## 2024-09-16 ENCOUNTER — OFFICE VISIT (OUTPATIENT)
Dept: ORTHOPEDICS | Facility: OTHER | Age: 69
End: 2024-09-16
Attending: ORTHOPAEDIC SURGERY
Payer: MEDICARE

## 2024-09-16 DIAGNOSIS — Z98.890 S/P ORIF (OPEN REDUCTION INTERNAL FIXATION) FRACTURE: Primary | ICD-10-CM

## 2024-09-16 DIAGNOSIS — Z87.81 S/P ORIF (OPEN REDUCTION INTERNAL FIXATION) FRACTURE: Primary | ICD-10-CM

## 2024-09-16 DIAGNOSIS — Z87.81 S/P ORIF (OPEN REDUCTION INTERNAL FIXATION) FRACTURE: ICD-10-CM

## 2024-09-16 DIAGNOSIS — Z98.890 S/P ORIF (OPEN REDUCTION INTERNAL FIXATION) FRACTURE: ICD-10-CM

## 2024-09-16 PROCEDURE — 73030 X-RAY EXAM OF SHOULDER: CPT | Mod: RT

## 2024-09-16 PROCEDURE — G0463 HOSPITAL OUTPT CLINIC VISIT: HCPCS

## 2024-09-16 PROCEDURE — 99024 POSTOP FOLLOW-UP VISIT: CPT | Performed by: ORTHOPAEDIC SURGERY

## 2024-09-16 PROCEDURE — 99495 TRANSJ CARE MGMT MOD F2F 14D: CPT | Performed by: ORTHOPAEDIC SURGERY

## 2024-09-16 NOTE — PROGRESS NOTES
Subjective:    69-year-old female who is status post open reduction internal fixation of a right proximal humerus fracture.  She is doing fairly well at this point but has not started physical therapy yet despite being 7 weeks out from surgery.    Objective:    On examination today she has very poor range of motion of the shoulder and severe weakness with external rotation as well.  She otherwise neurovascularly intact and her wound is well-healed     Assessment:    69-year-old female status post open reduction internal fixation right proximal humerus fracture dated 7/30/2024    Plan:    Continue current.  She starts physical therapy very soon here hopefully she will gain quite a bit of range of motion back with that.  We will see her back in about 5 to 6 weeks.

## 2024-09-19 ENCOUNTER — THERAPY VISIT (OUTPATIENT)
Dept: PHYSICAL THERAPY | Facility: OTHER | Age: 69
End: 2024-09-19
Attending: ORTHOPAEDIC SURGERY
Payer: MEDICARE

## 2024-09-19 DIAGNOSIS — Z87.81 S/P ORIF (OPEN REDUCTION INTERNAL FIXATION) FRACTURE: ICD-10-CM

## 2024-09-19 DIAGNOSIS — Z98.890 S/P ORIF (OPEN REDUCTION INTERNAL FIXATION) FRACTURE: ICD-10-CM

## 2024-09-19 PROCEDURE — 97162 PT EVAL MOD COMPLEX 30 MIN: CPT | Mod: GP | Performed by: PHYSICAL THERAPIST

## 2024-09-19 PROCEDURE — 97110 THERAPEUTIC EXERCISES: CPT | Mod: GP | Performed by: PHYSICAL THERAPIST

## 2024-09-24 ENCOUNTER — THERAPY VISIT (OUTPATIENT)
Dept: PHYSICAL THERAPY | Facility: OTHER | Age: 69
End: 2024-09-24
Attending: ORTHOPAEDIC SURGERY
Payer: MEDICARE

## 2024-09-24 DIAGNOSIS — Z87.81 S/P ORIF (OPEN REDUCTION INTERNAL FIXATION) FRACTURE: Primary | ICD-10-CM

## 2024-09-24 DIAGNOSIS — Z98.890 S/P ORIF (OPEN REDUCTION INTERNAL FIXATION) FRACTURE: Primary | ICD-10-CM

## 2024-09-24 PROCEDURE — 97140 MANUAL THERAPY 1/> REGIONS: CPT | Mod: GP | Performed by: PHYSICAL THERAPIST

## 2024-09-24 PROCEDURE — 97110 THERAPEUTIC EXERCISES: CPT | Mod: GP | Performed by: PHYSICAL THERAPIST

## 2024-09-24 NOTE — PROGRESS NOTES
PHYSICAL THERAPY EVALUATION  Type of Visit: Evaluation              Subjective       Presenting condition or subjective complaint: Had surgery for humerus bone fracture (right arm)  Date of onset: 07/28/24    Relevant medical history: High blood pressure; Overweight; Thyroid problems   Dates & types of surgery: Hysterectomy - 2005     Kidney stone surgery - 2005.    Radiofrequency Venous Closure procedure (vericose veins). 2010    Prior diagnostic imaging/testing results: X-ray     Prior therapy history for the same diagnosis, illness or injury: No      Patient presents to physical therapy with right shoulder pain difficulty with weightbearing swelling loss of movement stiffness and loss of strength.  Patient on 7/20/2024 fell with down her stairs.  Pain was very significant and new that she would have a broken shoulder.  Resulting in ORIF surgery.  2 out of 10 pain currently dull and aching sometimes but especially with activities positional movements and home chores.  Pain is improved with hot and cold and over-the-counter medications.  She is retired.  Enjoys walking and reading swimming and water aerobics.  She is to do a fair amount of water aerobics and classes at [facility].    Prior Level of Function  Transfers: Independent  Ambulation: Independent  ADL:   IADL:     Living Environment  Social support: With a significant other or spouse   Type of home: House; 2-story   Stairs to enter the home: No       Ramp: No   Stairs inside the home: Yes 12 Is there a railing: Yes     Help at home: None  Equipment owned:       Employment: No    Hobbies/Interests: Swimming, reading, baking    Patient goals for therapy: To be able to use my right arm and shoulder    Pain assessment:      Objective   SHOULDER EVALUATION  PAIN:   INTEGUMENTARY (edema, incisions): WFL  POSTURE: WFL  GAIT:   Weightbearing Status:   Assistive Device(s):   Gait Deviations:   BALANCE/PROPRIOCEPTION: WFL  WEIGHTBEARING ALIGNMENT:   ROM: 75  degrees flexion less than 5 degrees external rotation, to belly ER    STRENGTH: Greatly related to cyst strength  FLEXIBILITY: Reduced flexibility due to surgical intervention and use of sling for a long period of time  SPECIAL TESTS:   PALPATION: Tight traps and levator as well as increased stiffness of distal deltoid skin from chronic swelling  JOINT MOBILITY: Decreased glenohumeral mobility and scapulothoracic mobility  CERVICAL SCREEN: WFL    Assessment & Plan   CLINICAL IMPRESSIONS  Medical Diagnosis: S/P ORIF (open reduction internal fixation) fracture (Z98.890, Z87.81)    Treatment Diagnosis: Decreased range of motion and weakness status post humerus fracture   Impression/Assessment: Patient is a 69 year old female with shoulder complaints.  The following significant findings have been identified: Pain, Decreased ROM/flexibility, Decreased joint mobility, Decreased strength, Impaired muscle performance, and Decreased activity tolerance. These impairments interfere with their ability to perform self care tasks, work tasks, recreational activities, household chores, and driving  as compared to previous level of function.     Clinical Decision Making (Complexity):  Clinical Presentation: Stable/Uncomplicated  Clinical Presentation Rationale: based on medical and personal factors listed in PT evaluation  Clinical Decision Making (Complexity): Moderate complexity    PLAN OF CARE  Treatment Interventions:  Modalities: Cryotherapy, E-stim, Hot Pack  Interventions: Manual Therapy, Neuromuscular Re-education, Therapeutic Activity, Therapeutic Exercise, Aquatic Therapy    Long Term Goals     PT Goal 1  Goal Identifier: Home exercise program  Goal Description: Patient will maintain home exercise program at least 3 times a week to improve range of motion and strength  Target Date: 12/12/24  PT Goal 2  Goal Identifier: Range of motion  Goal Description: Range of motion will improve to at least 120 degrees flexion to allow  patient to reach up into cupboards  Target Date: 10/30/24  PT Goal 3  Goal Identifier: Don doff clothing  Goal Description: Patient will have no limits with range of motion or strength to allow her to don and doff clothing as well as do house chores with no limits  Target Date: 12/12/24      Frequency of Treatment: 2 times a week  Duration of Treatment: 12 weeks    Recommended Referrals to Other Professionals:   Education Assessment:        Risks and benefits of evaluation/treatment have been explained.   Patient/Family/caregiver agrees with Plan of Care.     Evaluation Time:     PT Eval, Moderate Complexity Minutes (26864): 30       Signing Clinician: ANY [facility]                                                                                   OUTPATIENT PHYSICAL THERAPY      PLAN OF TREATMENT FOR OUTPATIENT REHABILITATION   Patient's Last Name, First Name, Laura Tomas YOB: 1955   Provider's Name   [facility]   Medical Record No.  [id number]     Onset Date: 07/28/24  Start of Care Date: 09/19/24     Medical Diagnosis:  S/P ORIF (open reduction internal fixation) fracture (Z98.890, Z87.81)      PT Treatment Diagnosis:  Decreased range of motion and weakness status post humerus fracture Plan of Treatment  Frequency/Duration: 2 times a week/ 12 weeks    Certification date from 09/19/24 to 12/12/24         See note for plan of treatment details and functional goals     Adam Granado, PT                         I CERTIFY THE NEED FOR THESE SERVICES FURNISHED UNDER        THIS PLAN OF TREATMENT AND WHILE UNDER MY CARE     (Physician attestation of this document indicates review and certification of the therapy plan).              Referring Provider:  Gwyn Hairston    Initial Assessment  See Epic Evaluation- Start of Care Date: 09/19/24

## 2024-09-26 ENCOUNTER — THERAPY VISIT (OUTPATIENT)
Dept: PHYSICAL THERAPY | Facility: OTHER | Age: 69
End: 2024-09-26
Attending: ORTHOPAEDIC SURGERY
Payer: MEDICARE

## 2024-09-26 DIAGNOSIS — Z87.81 S/P ORIF (OPEN REDUCTION INTERNAL FIXATION) FRACTURE: Primary | ICD-10-CM

## 2024-09-26 DIAGNOSIS — Z98.890 S/P ORIF (OPEN REDUCTION INTERNAL FIXATION) FRACTURE: Primary | ICD-10-CM

## 2024-09-26 PROCEDURE — 97140 MANUAL THERAPY 1/> REGIONS: CPT | Mod: GP | Performed by: PHYSICAL THERAPIST

## 2024-09-26 PROCEDURE — 97110 THERAPEUTIC EXERCISES: CPT | Mod: GP | Performed by: PHYSICAL THERAPIST

## 2024-10-01 ENCOUNTER — THERAPY VISIT (OUTPATIENT)
Dept: PHYSICAL THERAPY | Facility: OTHER | Age: 69
End: 2024-10-01
Attending: ORTHOPAEDIC SURGERY
Payer: MEDICARE

## 2024-10-01 DIAGNOSIS — Z98.890 S/P ORIF (OPEN REDUCTION INTERNAL FIXATION) FRACTURE: Primary | ICD-10-CM

## 2024-10-01 DIAGNOSIS — Z87.81 S/P ORIF (OPEN REDUCTION INTERNAL FIXATION) FRACTURE: Primary | ICD-10-CM

## 2024-10-01 PROCEDURE — 97140 MANUAL THERAPY 1/> REGIONS: CPT | Mod: GP | Performed by: PHYSICAL THERAPIST

## 2024-10-01 PROCEDURE — 97110 THERAPEUTIC EXERCISES: CPT | Mod: GP | Performed by: PHYSICAL THERAPIST

## 2024-10-03 ENCOUNTER — THERAPY VISIT (OUTPATIENT)
Dept: PHYSICAL THERAPY | Facility: OTHER | Age: 69
End: 2024-10-03
Attending: ORTHOPAEDIC SURGERY
Payer: MEDICARE

## 2024-10-03 DIAGNOSIS — Z98.890 S/P ORIF (OPEN REDUCTION INTERNAL FIXATION) FRACTURE: Primary | ICD-10-CM

## 2024-10-03 DIAGNOSIS — Z87.81 S/P ORIF (OPEN REDUCTION INTERNAL FIXATION) FRACTURE: Primary | ICD-10-CM

## 2024-10-03 PROCEDURE — 97110 THERAPEUTIC EXERCISES: CPT | Mod: GP | Performed by: PHYSICAL THERAPIST

## 2024-10-03 PROCEDURE — 97140 MANUAL THERAPY 1/> REGIONS: CPT | Mod: GP | Performed by: PHYSICAL THERAPIST

## 2024-10-08 ENCOUNTER — THERAPY VISIT (OUTPATIENT)
Dept: PHYSICAL THERAPY | Facility: OTHER | Age: 69
End: 2024-10-08
Attending: ORTHOPAEDIC SURGERY
Payer: MEDICARE

## 2024-10-08 DIAGNOSIS — Z87.81 S/P ORIF (OPEN REDUCTION INTERNAL FIXATION) FRACTURE: Primary | ICD-10-CM

## 2024-10-08 DIAGNOSIS — Z98.890 S/P ORIF (OPEN REDUCTION INTERNAL FIXATION) FRACTURE: Primary | ICD-10-CM

## 2024-10-08 PROCEDURE — 97110 THERAPEUTIC EXERCISES: CPT | Mod: GP | Performed by: PHYSICAL THERAPIST

## 2024-10-08 PROCEDURE — 97140 MANUAL THERAPY 1/> REGIONS: CPT | Mod: GP | Performed by: PHYSICAL THERAPIST

## 2024-10-10 ENCOUNTER — THERAPY VISIT (OUTPATIENT)
Dept: PHYSICAL THERAPY | Facility: OTHER | Age: 69
End: 2024-10-10
Attending: ORTHOPAEDIC SURGERY
Payer: MEDICARE

## 2024-10-10 DIAGNOSIS — Z87.81 S/P ORIF (OPEN REDUCTION INTERNAL FIXATION) FRACTURE: Primary | ICD-10-CM

## 2024-10-10 DIAGNOSIS — Z98.890 S/P ORIF (OPEN REDUCTION INTERNAL FIXATION) FRACTURE: Primary | ICD-10-CM

## 2024-10-10 PROCEDURE — 97110 THERAPEUTIC EXERCISES: CPT | Mod: GP | Performed by: PHYSICAL THERAPIST

## 2024-10-10 PROCEDURE — 97140 MANUAL THERAPY 1/> REGIONS: CPT | Mod: GP | Performed by: PHYSICAL THERAPIST

## 2024-10-15 ENCOUNTER — THERAPY VISIT (OUTPATIENT)
Dept: PHYSICAL THERAPY | Facility: OTHER | Age: 69
End: 2024-10-15
Attending: ORTHOPAEDIC SURGERY
Payer: MEDICARE

## 2024-10-15 DIAGNOSIS — Z87.81 S/P ORIF (OPEN REDUCTION INTERNAL FIXATION) FRACTURE: Primary | ICD-10-CM

## 2024-10-15 DIAGNOSIS — Z98.890 S/P ORIF (OPEN REDUCTION INTERNAL FIXATION) FRACTURE: Primary | ICD-10-CM

## 2024-10-15 PROCEDURE — 97110 THERAPEUTIC EXERCISES: CPT | Mod: GP | Performed by: PHYSICAL THERAPIST

## 2024-10-15 PROCEDURE — 97140 MANUAL THERAPY 1/> REGIONS: CPT | Mod: GP | Performed by: PHYSICAL THERAPIST

## 2024-10-16 NOTE — PROGRESS NOTES
PLAN  Continue therapy per current plan of care.    Images of her elbow would be nice to see what is potentially limiting her range of motion and functionality through her elbow flexion and extension.  Her shoulder range of motion and does appear to be improving slowly now able to reach up to about 110 degrees flexion but still likely adhesive capsulitis.     Beginning/End Dates of Progress Note Reporting Period:  09/19/24 to 10/15/2024    Referring Provider:  Gwyn Hairston         10/15/24 [id number]   Appointment Info   Signing clinician's name / credentials Adam Granado DPT   Total/Authorized Visits 8   Visits Used 8   Medical Diagnosis S/P ORIF (open reduction internal fixation) fracture (Z98.890, Z87.81)   PT Tx Diagnosis Decreased range of motion and weakness status post humerus fracture   Progress Note/Certification   Start of Care Date 09/19/24   Onset of illness/injury or Date of Surgery 07/28/24   Therapy Frequency 2 times a week   Predicted Duration 12 weeks   Certification date from 09/19/24   Certification date to 12/12/24   Progress Note Completed Date 09/19/24   GOALS   PT Goals 2;3   PT Goal 1   Goal Identifier Home exercise program   Goal Description Patient will maintain home exercise program at least 3 times a week to improve range of motion and strength   Target Date 12/12/24   PT Goal 2   Goal Identifier Range of motion   Goal Description Range of motion will improve to at least 120 degrees flexion to allow patient to reach up into cupboards   Target Date 10/30/24   PT Goal 3   Goal Identifier Don doff clothing   Goal Description Patient will have no limits with range of motion or strength to allow her to don and doff clothing as well as do house chores with no limits   Target Date 12/12/24   Subjective Report   Subjective Report Was able to tie her shoes today so she knows that her range of motion is improving.   PT Modalities   PT Modalities Hot Packs   Treatment Interventions (PT)    Interventions Therapeutic Procedure/Exercise;Manual Therapy   Therapeutic Procedure/Exercise   Therapeutic Procedures: strength, endurance, ROM, flexibility minutes (25530) 30   Ther Proc 1 - Details Stepper 5 minutes, pulleys flexion stretch, cable resisted IR and ER 1 to 1.5 kg, unilateral row unilateral shoulder extension/Scap retraction and adduction 1 x 15 to 20 exercises of each supine passive and active assisted range of motion for flexion passive range of motion and stretch for external rotation internal rotation and abduction as well as elbow flexion extension   Skilled Intervention to improve ROM/strength   Manual Therapy   Manual Therapy: Mobilization, MFR, MLD, friction massage minutes (56884) 15   Manual Therapy 1 - Details Glenohumeral joint mobilizations elbow mobilizations proximal radial head mobilizations soft tissue mobilizations on the biceps deltoid supraspinatus traps brachial radialis   Skilled Intervention to improve mobility and reduce pain.   Plan   Plan for next session Range of motion and spinal mobility   Comments   Comments note to MD for elbow images.   Total Session Time   Timed Code Treatment Minutes 45   Total Treatment Time (sum of timed and untimed services) 45

## 2024-10-17 DIAGNOSIS — Z98.890 S/P ORIF (OPEN REDUCTION INTERNAL FIXATION) FRACTURE: Primary | ICD-10-CM

## 2024-10-17 DIAGNOSIS — Z87.81 S/P ORIF (OPEN REDUCTION INTERNAL FIXATION) FRACTURE: Primary | ICD-10-CM

## 2024-10-21 ENCOUNTER — HOSPITAL ENCOUNTER (OUTPATIENT)
Dept: GENERAL RADIOLOGY | Facility: OTHER | Age: 69
Discharge: HOME | End: 2024-10-21
Attending: ORTHOPAEDIC SURGERY
Payer: MEDICARE

## 2024-10-21 ENCOUNTER — OFFICE VISIT (OUTPATIENT)
Dept: ORTHOPEDICS | Facility: OTHER | Age: 69
End: 2024-10-21
Attending: ORTHOPAEDIC SURGERY
Payer: MEDICARE

## 2024-10-21 DIAGNOSIS — Z98.890 S/P ORIF (OPEN REDUCTION INTERNAL FIXATION) FRACTURE: ICD-10-CM

## 2024-10-21 DIAGNOSIS — Z87.81 S/P ORIF (OPEN REDUCTION INTERNAL FIXATION) FRACTURE: ICD-10-CM

## 2024-10-21 DIAGNOSIS — M25.521 RIGHT ELBOW PAIN: Primary | ICD-10-CM

## 2024-10-21 DIAGNOSIS — M25.521 RIGHT ELBOW PAIN: ICD-10-CM

## 2024-10-21 DIAGNOSIS — M25.512 LEFT SHOULDER PAIN, UNSPECIFIED CHRONICITY: ICD-10-CM

## 2024-10-21 PROCEDURE — G0463 HOSPITAL OUTPT CLINIC VISIT: HCPCS | Mod: 25

## 2024-10-21 PROCEDURE — 73030 X-RAY EXAM OF SHOULDER: CPT | Mod: RT

## 2024-10-21 PROCEDURE — G0463 HOSPITAL OUTPT CLINIC VISIT: HCPCS

## 2024-10-21 PROCEDURE — 99213 OFFICE O/P EST LOW 20 MIN: CPT | Performed by: ORTHOPAEDIC SURGERY

## 2024-10-21 PROCEDURE — 73080 X-RAY EXAM OF ELBOW: CPT | Mod: RT

## 2024-10-21 NOTE — PROGRESS NOTES
Subjective:    69-year-old female status post ORIF of a fairly significant right proximal humerus fracture.  She is done fairly well with this and is continuing to work in physical therapy trying to strengthen the rotator cuff.  She has very stiff right shoulder.  Her elbow is also very stiff but she notes that she had significant decrease in range of motion on the right elbow even prior to the shoulder injury.  It is just worse now that she spent time in the sling.  She asks for an x-ray of that.  She is also having significant pain in the left shoulder so x-rays were done today    Objective:    On examination this is a 69-year-old female in no acute distress.  Very pleasant on examination.  Wound is well-healed in the anterior of the right shoulder.  She has about 110 degrees of forward flexion.  She is otherwise neuro and vascularly intact at the right shoulder.  Examination of the left shoulder shows full range of motion.  She has weakness with supraspinatus and infraspinatus testing on the left.  She is otherwise neuro and vascularly intact.  Examination of the right elbow shows a loss of range of motion of approximately 20 degrees of extension.    Assessment:    69-year-old female with possible rotator cuff tear of the left shoulder.  She has pre-existing osteoarthritis of the right elbow with decreased range of motion secondary to that.  Right shoulder appears to be healing nicely and she is making gains in physical therapy slowly.      Plan:    Will obtain an MRI of her left shoulder specifically to evaluate her rotator cuff and she is to continue physical therapy.  Will call her with results of the MRI.

## 2024-10-22 ENCOUNTER — THERAPY VISIT (OUTPATIENT)
Dept: PHYSICAL THERAPY | Facility: OTHER | Age: 69
End: 2024-10-22
Attending: ORTHOPAEDIC SURGERY
Payer: MEDICARE

## 2024-10-22 DIAGNOSIS — M25.561 CHRONIC PAIN OF RIGHT KNEE: ICD-10-CM

## 2024-10-22 DIAGNOSIS — Z87.81 S/P ORIF (OPEN REDUCTION INTERNAL FIXATION) FRACTURE: Primary | ICD-10-CM

## 2024-10-22 DIAGNOSIS — Z98.890 S/P ORIF (OPEN REDUCTION INTERNAL FIXATION) FRACTURE: Primary | ICD-10-CM

## 2024-10-22 DIAGNOSIS — G89.29 CHRONIC PAIN OF RIGHT KNEE: ICD-10-CM

## 2024-10-22 PROCEDURE — 97140 MANUAL THERAPY 1/> REGIONS: CPT | Mod: GP | Performed by: PHYSICAL THERAPIST

## 2024-10-22 PROCEDURE — 97110 THERAPEUTIC EXERCISES: CPT | Mod: GP | Performed by: PHYSICAL THERAPIST

## 2024-10-24 ENCOUNTER — THERAPY VISIT (OUTPATIENT)
Dept: PHYSICAL THERAPY | Facility: OTHER | Age: 69
End: 2024-10-24
Attending: ORTHOPAEDIC SURGERY
Payer: MEDICARE

## 2024-10-24 DIAGNOSIS — Z98.890 S/P ORIF (OPEN REDUCTION INTERNAL FIXATION) FRACTURE: Primary | ICD-10-CM

## 2024-10-24 DIAGNOSIS — Z87.81 S/P ORIF (OPEN REDUCTION INTERNAL FIXATION) FRACTURE: Primary | ICD-10-CM

## 2024-10-24 PROCEDURE — 97140 MANUAL THERAPY 1/> REGIONS: CPT | Mod: GP | Performed by: PHYSICAL THERAPIST

## 2024-10-24 PROCEDURE — 97110 THERAPEUTIC EXERCISES: CPT | Mod: GP | Performed by: PHYSICAL THERAPIST

## 2024-10-24 NOTE — PROGRESS NOTES
PLAN  Continue therapy per current plan of care.    Beginning/End Dates of Progress Note Reporting Period:  10/24/24 (Eval 9/19/24) to 10/24/2024    Referring Provider:  Gwyn Hairston     10/24/24 0500   Appointment Info   Signing clinician's name / credentials Adam Granado DPT   Total/Authorized Visits 10   Visits Used 10   Medical Diagnosis S/P ORIF (open reduction internal fixation) fracture (Z98.890, Z87.81)   PT Tx Diagnosis Decreased range of motion and weakness status post humerus fracture   Progress Note/Certification   Start of Care Date 09/19/24   Onset of illness/injury or Date of Surgery 07/28/24   Therapy Frequency 2 times a week   Predicted Duration 12 weeks   Certification date from 09/19/24   Certification date to 12/12/24   Progress Note Completed Date 10/24/24  (Eval 9/19/24)   GOALS   PT Goals 2;3   PT Goal 1   Goal Identifier Home exercise program   Goal Description Patient will maintain home exercise program at least 3 times a week to improve range of motion and strength   Goal Progress Patient doing well with home exercise program   Target Date 12/12/24   PT Goal 2   Goal Identifier Range of motion   Goal Description Range of motion will improve to at least 120 degrees flexion to allow patient to reach up into cupboards   Goal Progress At about a 100 to 105 degrees flexion at this time passively and 90 degrees actively   Target Date 10/30/24   PT Goal 3   Goal Identifier Don doff clothing   Goal Description Patient will have no limits with range of motion or strength to allow her to don and doff clothing as well as do house chores with no limits   Goal Progress Elbow flexion is very limited in this and overhead strength of shoulder is very limiting   Target Date 12/12/24   Subjective Report   Subjective Report Left shoulder continues to be mildly sore.  Does feel like her shoulder is getting better but is also easily fatigued   PT Modalities   PT Modalities Hot Packs   Treatment  Interventions (PT)   Interventions Therapeutic Procedure/Exercise;Manual Therapy   Therapeutic Procedure/Exercise   Therapeutic Procedures: strength, endurance, ROM, flexibility minutes (26495) 30   Ther Proc 1 - Details Stepper 5 minutes, pulleys flexion stretch x 2 minutes, assisted with no weight on right and 1 pound on left side scaption, 5 pound bicep curls 0.5 to 1.5 kg resisted IR and ER bilateral, pulleys flexion stretch, seated scapular retraction, wall ladder for shoulder flexion up to #30 x 3   Skilled Intervention to improve ROM/strength   Manual Therapy   Manual Therapy: Mobilization, MFR, MLD, friction massage minutes (85179) 10   Manual Therapy 1 - Details Glenohumeral joint mobilizations elbow mobilizations proximal radial head mobilizations soft tissue mobilizations on the biceps deltoid supraspinatus traps brachial radialis, seated soft tissue mobilization and scapular mobilizations   Skilled Intervention to improve mobility and reduce pain.   Plan   Plan for next session Range of motion and spinal mobility   Comments   Comments Shoulder strength above 90 is difficult   Total Session Time   Timed Code Treatment Minutes 40   Total Treatment Time (sum of timed and untimed services) 40

## 2024-10-29 ENCOUNTER — THERAPY VISIT (OUTPATIENT)
Dept: PHYSICAL THERAPY | Facility: OTHER | Age: 69
End: 2024-10-29
Attending: ORTHOPAEDIC SURGERY
Payer: MEDICARE

## 2024-10-29 DIAGNOSIS — Z87.81 S/P ORIF (OPEN REDUCTION INTERNAL FIXATION) FRACTURE: Primary | ICD-10-CM

## 2024-10-29 DIAGNOSIS — Z98.890 S/P ORIF (OPEN REDUCTION INTERNAL FIXATION) FRACTURE: Primary | ICD-10-CM

## 2024-10-29 PROCEDURE — 97140 MANUAL THERAPY 1/> REGIONS: CPT | Mod: GP | Performed by: PHYSICAL THERAPIST

## 2024-10-29 PROCEDURE — 97110 THERAPEUTIC EXERCISES: CPT | Mod: GP | Performed by: PHYSICAL THERAPIST

## 2024-10-30 ENCOUNTER — HOSPITAL ENCOUNTER (OUTPATIENT)
Dept: MRI IMAGING | Facility: OTHER | Age: 69
Discharge: HOME | End: 2024-10-30
Attending: ORTHOPAEDIC SURGERY | Admitting: ORTHOPAEDIC SURGERY
Payer: MEDICARE

## 2024-10-30 DIAGNOSIS — M25.512 LEFT SHOULDER PAIN, UNSPECIFIED CHRONICITY: ICD-10-CM

## 2024-10-30 PROCEDURE — 73221 MRI JOINT UPR EXTREM W/O DYE: CPT | Mod: LT,ME

## 2024-11-05 ENCOUNTER — THERAPY VISIT (OUTPATIENT)
Dept: PHYSICAL THERAPY | Facility: OTHER | Age: 69
End: 2024-11-05
Attending: ORTHOPAEDIC SURGERY
Payer: MEDICARE

## 2024-11-05 DIAGNOSIS — Z98.890 S/P ORIF (OPEN REDUCTION INTERNAL FIXATION) FRACTURE: Primary | ICD-10-CM

## 2024-11-05 DIAGNOSIS — Z87.81 S/P ORIF (OPEN REDUCTION INTERNAL FIXATION) FRACTURE: Primary | ICD-10-CM

## 2024-11-08 ENCOUNTER — THERAPY VISIT (OUTPATIENT)
Dept: PHYSICAL THERAPY | Facility: OTHER | Age: 69
End: 2024-11-08
Attending: ORTHOPAEDIC SURGERY
Payer: MEDICARE

## 2024-11-08 DIAGNOSIS — Z98.890 S/P ORIF (OPEN REDUCTION INTERNAL FIXATION) FRACTURE: Primary | ICD-10-CM

## 2024-11-08 DIAGNOSIS — Z87.81 S/P ORIF (OPEN REDUCTION INTERNAL FIXATION) FRACTURE: Primary | ICD-10-CM

## 2024-11-13 ENCOUNTER — THERAPY VISIT (OUTPATIENT)
Dept: PHYSICAL THERAPY | Facility: OTHER | Age: 69
End: 2024-11-13
Attending: ORTHOPAEDIC SURGERY
Payer: COMMERCIAL

## 2024-11-13 DIAGNOSIS — Z98.890 S/P ORIF (OPEN REDUCTION INTERNAL FIXATION) FRACTURE: Primary | ICD-10-CM

## 2024-11-13 DIAGNOSIS — Z87.81 S/P ORIF (OPEN REDUCTION INTERNAL FIXATION) FRACTURE: Primary | ICD-10-CM

## 2024-11-14 ENCOUNTER — TELEPHONE (OUTPATIENT)
Dept: OTOLARYNGOLOGY | Facility: OTHER | Age: 69
End: 2024-11-14

## 2024-11-14 NOTE — TELEPHONE ENCOUNTER
Voicemail received from patient regarding sinus issues.  Reported symptoms include, sinus drainage, ear pain, congestion,dried out sinuses causing sinus headache, woke up dizzy and light headed today. She has been using her netti-pot with Budesonide the past couple days.  Wondering what other suggestions Lorelei Guzman may have to try to help with symptoms.

## 2024-11-15 ENCOUNTER — THERAPY VISIT (OUTPATIENT)
Dept: PHYSICAL THERAPY | Facility: OTHER | Age: 69
End: 2024-11-15
Attending: ORTHOPAEDIC SURGERY
Payer: MEDICARE

## 2024-11-15 DIAGNOSIS — Z87.81 S/P ORIF (OPEN REDUCTION INTERNAL FIXATION) FRACTURE: Primary | ICD-10-CM

## 2024-11-15 DIAGNOSIS — Z98.890 S/P ORIF (OPEN REDUCTION INTERNAL FIXATION) FRACTURE: Primary | ICD-10-CM

## 2024-11-15 NOTE — TELEPHONE ENCOUNTER
Continue w/ rinses twice a day.   May try OTC muccinex.   If no improvement, may need to be seen or start oral abx.   May increase Flonase to 1 spray twice a day

## 2024-11-16 ENCOUNTER — OFFICE VISIT (OUTPATIENT)
Dept: FAMILY MEDICINE | Facility: OTHER | Age: 69
End: 2024-11-16
Attending: REGISTERED NURSE
Payer: MEDICARE

## 2024-11-16 VITALS
HEIGHT: 63 IN | DIASTOLIC BLOOD PRESSURE: 90 MMHG | SYSTOLIC BLOOD PRESSURE: 132 MMHG | BODY MASS INDEX: 39.16 KG/M2 | WEIGHT: 221 LBS | OXYGEN SATURATION: 99 % | TEMPERATURE: 98.1 F | RESPIRATION RATE: 16 BRPM | HEART RATE: 74 BPM

## 2024-11-16 DIAGNOSIS — J01.10 ACUTE NON-RECURRENT FRONTAL SINUSITIS: Primary | ICD-10-CM

## 2024-11-16 DIAGNOSIS — H92.03 OTALGIA, BILATERAL: ICD-10-CM

## 2024-11-16 DIAGNOSIS — J30.2 SEASONAL ALLERGIC RHINITIS, UNSPECIFIED TRIGGER: ICD-10-CM

## 2024-11-16 PROCEDURE — G0463 HOSPITAL OUTPT CLINIC VISIT: HCPCS

## 2024-11-16 RX ORDER — FLUTICASONE PROPIONATE 50 MCG
1 SPRAY, SUSPENSION (ML) NASAL DAILY
COMMUNITY
Start: 2024-11-16

## 2024-11-16 ASSESSMENT — ENCOUNTER SYMPTOMS
FATIGUE: 0
FEVER: 0
HEADACHES: 1
DIZZINESS: 1
SORE THROAT: 1
CHILLS: 0
SHORTNESS OF BREATH: 0
COUGH: 0

## 2024-11-16 ASSESSMENT — PAIN SCALES - GENERAL: PAINLEVEL_OUTOF10: MODERATE PAIN (5)

## 2024-11-16 NOTE — PROGRESS NOTES
"Assessment & Plan     ICD-10-CM    1. Acute non-recurrent frontal sinusitis  J01.10 amoxicillin-clavulanate (AUGMENTIN) 875-125 MG tablet     fluticasone (FLONASE) 50 MCG/ACT nasal spray      2. Seasonal allergic rhinitis, unspecified trigger  J30.2       3. Otalgia, bilateral  H92.03          Vital signs stable. PE consistent with acute frontal sinusitis. Due to patient only having symptoms x 4 days, wait and see script for Augmentin 875-125 mg tablets, take 1 tablet PO BID x 7 days printed for patient if symptoms do not improve in the next 48-72 hours. Encouraged use of Flonase, 1 spray into each nostril daily to help dry out mucus.     Alternate Tylenol or ibuprofen for pain and fevers every 4 hours as needed. Discussed that we normally do not treat sinus infections of less than 10 days duration with oral antibiotics as these are typically viral in nature. Discussed that if an antibiotic was prescribed today for bacterial sinusitis to take the entire course of antibiotic, discussed side effect profile of prescribed medications.       Patient is in agreement and understanding of the above treatment plan. All questions and concerns were addressed and answered to patient's satisfaction. AVS reviewed with patient.            BMI  Estimated body mass index is 38.84 kg/m  as calculated from the following:    Height as of this encounter: 1.607 m (5' 3.25\").    Weight as of this encounter: 100.2 kg (221 lb).           Return if symptoms worsen or fail to improve.      ALICIA Morales [facility] AND \[facility]\""    Review of Systems   Constitutional:  Negative for chills, fatigue and fever.   HENT:  Positive for congestion, ear pain and sore throat.    Respiratory:  Negative for cough and shortness of breath.    Cardiovascular:  Negative for chest pain.   Neurological:  Positive for dizziness and headaches.   All other systems reviewed and are negative.            Romain Sanchez is a 69 year old, " "presenting for the following health issues:  Ear Problem, Sinus Problem, Pharyngitis, and Headache    Patient presents with 4 day history of frontal sinus pain, bilateral otalgia, and congestion. She has tried using her Neti-pot and currently takes cetrizine for seasonal allergic rhinitis.            Objective    BP (!) 132/90 (BP Location: Left arm, Patient Position: Sitting, Cuff Size: Adult Regular)   Pulse 74   Temp 98.1  F (36.7  C) (Tympanic)   Resp 16   Ht 1.607 m (5' 3.25\")   Wt 100.2 kg (221 lb)   LMP 01/01/2005   SpO2 99%   BMI 38.84 kg/m    Body mass index is 38.84 kg/m .  Physical Exam  Vitals reviewed.   Constitutional:       Appearance: Normal appearance.   HENT:      Right Ear: Tympanic membrane, ear canal and external ear normal.      Left Ear: Tympanic membrane, ear canal and external ear normal.      Nose: Congestion and rhinorrhea present.      Right Sinus: Frontal sinus tenderness present.      Left Sinus: Frontal sinus tenderness present.      Mouth/Throat:      Pharynx: No posterior oropharyngeal erythema.   Cardiovascular:      Pulses: Normal pulses.      Heart sounds: Normal heart sounds.   Pulmonary:      Effort: Pulmonary effort is normal.      Breath sounds: Normal breath sounds.   Skin:     General: Skin is warm and dry.   Neurological:      General: No focal deficit present.      Mental Status: She is alert and oriented to person, place, and time.      Motor: No weakness.      Gait: Gait normal.                    Signed Electronically by: ALICIA Morales CNP  "

## 2024-11-16 NOTE — NURSING NOTE
Pt here for bilateral ear pain and dizzy since Wednesday.  Now has a sore throat and sinus pressure and GARCIA's.  Michelle Light CMA (AAMA)......................11/16/2024  1:57 PM       Medication Reconciliation: complete    Michelle Light CMA  11/16/2024 1:57 PM      FOOD SECURITY SCREENING QUESTIONS:    The next two questions are to help us understand your food security.  If you are feeling you need any assistance in this area, we have resources available to support you today.    Hunger Vital Signs:  Within the past 12 months we worried whether our food would run out before we got money to buy more. Never  Within the past 12 months the food we bought just didn't last and we didn't have money to get more. Never  Michelle Light CMA,LPN on 11/16/2024 at 1:57 PM

## 2024-11-16 NOTE — PATIENT INSTRUCTIONS

## 2024-11-20 ENCOUNTER — THERAPY VISIT (OUTPATIENT)
Dept: PHYSICAL THERAPY | Facility: OTHER | Age: 69
End: 2024-11-20
Attending: ORTHOPAEDIC SURGERY
Payer: MEDICARE

## 2024-11-20 DIAGNOSIS — Z87.81 S/P ORIF (OPEN REDUCTION INTERNAL FIXATION) FRACTURE: Primary | ICD-10-CM

## 2024-11-20 DIAGNOSIS — E78.00 HYPERCHOLESTEROLEMIA: ICD-10-CM

## 2024-11-20 DIAGNOSIS — Z98.890 S/P ORIF (OPEN REDUCTION INTERNAL FIXATION) FRACTURE: Primary | ICD-10-CM

## 2024-11-20 RX ORDER — ROSUVASTATIN CALCIUM 5 MG/1
5 TABLET, COATED ORAL DAILY
Qty: 90 TABLET | Refills: 3 | OUTPATIENT
Start: 2024-11-20

## 2024-11-20 NOTE — TELEPHONE ENCOUNTER
[facility] sent Rx request for the following:      Redundant refill request refused: Too soon:  rosuvastatin (CRESTOR) 5 MG tablet 90 tablet 3 2/27/2024 -- No   Sig - Route: Take 1 tablet (5 mg) by mouth daily - Oral   Sent to pharmacy as: Rosuvastatin Calcium 5 MG Oral Tablet (CRESTOR)   Class: E-Prescribe   Order: [id number]   E-Prescribing Status: Receipt confirmed by pharmacy (2/27/2024 11:23 AM CST)     Printout Tracking    External Result Report     Pharmacy    [id number] - [place], [place] - [address]   Nellie Alonzo RN .............. 11/20/2024  10:28 AM

## 2024-12-02 ENCOUNTER — OFFICE VISIT (OUTPATIENT)
Dept: ORTHOPEDICS | Facility: OTHER | Age: 69
End: 2024-12-02
Attending: ORTHOPAEDIC SURGERY
Payer: MEDICARE

## 2024-12-02 DIAGNOSIS — M25.512 LEFT SHOULDER PAIN, UNSPECIFIED CHRONICITY: Primary | ICD-10-CM

## 2024-12-02 PROCEDURE — G0463 HOSPITAL OUTPT CLINIC VISIT: HCPCS

## 2024-12-02 PROCEDURE — 250N000011 HC RX IP 250 OP 636: Mod: JZ | Performed by: ORTHOPAEDIC SURGERY

## 2024-12-02 PROCEDURE — 250N000009 HC RX 250: Performed by: ORTHOPAEDIC SURGERY

## 2024-12-02 RX ORDER — LIDOCAINE HYDROCHLORIDE 10 MG/ML
4 INJECTION, SOLUTION EPIDURAL; INFILTRATION; INTRACAUDAL; PERINEURAL ONCE
Status: COMPLETED | OUTPATIENT
Start: 2024-12-02 | End: 2024-12-02

## 2024-12-02 RX ORDER — TRIAMCINOLONE ACETONIDE 40 MG/ML
40 INJECTION, SUSPENSION INTRA-ARTICULAR; INTRAMUSCULAR ONCE
Status: COMPLETED | OUTPATIENT
Start: 2024-12-02 | End: 2024-12-02

## 2024-12-02 RX ADMIN — TRIAMCINOLONE ACETONIDE 40 MG: 40 INJECTION, SUSPENSION INTRA-ARTICULAR; INTRAMUSCULAR at 10:37

## 2024-12-02 RX ADMIN — LIDOCAINE HYDROCHLORIDE 4 ML: 10 INJECTION, SOLUTION INFILTRATION; PERINEURAL at 10:37

## 2024-12-02 NOTE — PROGRESS NOTES
SUBJECTIVE:  Laura is a 69-year-old female who presents today for a left subacromial corticosteroid injection.  It is very likely she would benefit from a SCD, DCE, BT and possible rotator cuff repair.  However, since she is still recovering from a right proximal humerus fracture she would like to avoid any surgery at this time and proceed with injections.    OBJECTIVE:  69-year-old female alert and oriented in no acute distress.  She has full range of motion of the left shoulder but with noticeable discomfort.  She has weakness to supraspinatus and infraspinatus testing.  She is otherwise neurovascularly intact.    ASSESSMENT/PLAN:  1. Left shoulder pain, unspecified chronicity (Primary)  Corticosteroid injection given in the left subacromial space today.  She tolerated the procedure well and noticed immediate relief.  We will plan to follow-up on an as-needed basis when she feels ready to proceed with a left shoulder arthroscopy. A steroid injection was performed at left subacromial space using 1% plain Lidocaine and 40 mg of Kenalog. This was well tolerated.  - lidocaine (PF) (XYLOCAINE) 1 % injection 4 mL  - triamcinolone (KENALOG-40) injection 40 mg

## 2024-12-04 ENCOUNTER — THERAPY VISIT (OUTPATIENT)
Dept: PHYSICAL THERAPY | Facility: OTHER | Age: 69
End: 2024-12-04
Attending: ORTHOPAEDIC SURGERY
Payer: COMMERCIAL

## 2024-12-04 DIAGNOSIS — Z87.81 S/P ORIF (OPEN REDUCTION INTERNAL FIXATION) FRACTURE: Primary | ICD-10-CM

## 2024-12-04 DIAGNOSIS — Z98.890 S/P ORIF (OPEN REDUCTION INTERNAL FIXATION) FRACTURE: Primary | ICD-10-CM

## 2024-12-09 ENCOUNTER — MYC MEDICAL ADVICE (OUTPATIENT)
Dept: FAMILY MEDICINE | Facility: OTHER | Age: 69
End: 2024-12-09
Payer: COMMERCIAL

## 2024-12-09 DIAGNOSIS — J30.89 PERENNIAL ALLERGIC RHINITIS: ICD-10-CM

## 2024-12-11 RX ORDER — ALBUTEROL SULFATE 90 UG/1
2 INHALANT RESPIRATORY (INHALATION) EVERY 4 HOURS PRN
Qty: 18 G | Refills: 11 | Status: SHIPPED | OUTPATIENT
Start: 2024-12-11

## 2024-12-11 NOTE — TELEPHONE ENCOUNTER
Pt is requesting refill of Albuterol inhaler.     Last ordered 12/5/22    Huan'd up order.     Routing to provider to review and respond.  Josefina Cortés RN on 12/11/2024 at 2:30 PM

## 2024-12-16 ENCOUNTER — OFFICE VISIT (OUTPATIENT)
Dept: FAMILY MEDICINE | Facility: OTHER | Age: 69
End: 2024-12-16
Attending: NURSE PRACTITIONER
Payer: MEDICARE

## 2024-12-16 VITALS
SYSTOLIC BLOOD PRESSURE: 145 MMHG | RESPIRATION RATE: 17 BRPM | WEIGHT: 221 LBS | OXYGEN SATURATION: 99 % | HEIGHT: 63 IN | BODY MASS INDEX: 39.16 KG/M2 | TEMPERATURE: 98.9 F | DIASTOLIC BLOOD PRESSURE: 82 MMHG | HEART RATE: 72 BPM

## 2024-12-16 DIAGNOSIS — R82.90: ICD-10-CM

## 2024-12-16 DIAGNOSIS — R10.9 FLANK PAIN: ICD-10-CM

## 2024-12-16 DIAGNOSIS — N39.0 URINARY TRACT INFECTION WITHOUT HEMATURIA, SITE UNSPECIFIED: Primary | ICD-10-CM

## 2024-12-16 DIAGNOSIS — R39.89 URINARY PROBLEM: ICD-10-CM

## 2024-12-16 LAB
ALBUMIN UR-MCNC: NEGATIVE MG/DL
APPEARANCE UR: CLEAR
BACTERIA #/AREA URNS HPF: ABNORMAL /HPF
BILIRUB UR QL STRIP: NEGATIVE
COLOR UR AUTO: ABNORMAL
GLUCOSE UR STRIP-MCNC: NEGATIVE MG/DL
HGB UR QL STRIP: NEGATIVE
KETONES UR STRIP-MCNC: NEGATIVE MG/DL
LEUKOCYTE ESTERASE UR QL STRIP: ABNORMAL
MUCOUS THREADS #/AREA URNS LPF: PRESENT /LPF
NITRATE UR QL: NEGATIVE
PH UR STRIP: 5.5 [PH] (ref 5–9)
RBC URINE: 1 /HPF
SP GR UR STRIP: 1.01 (ref 1–1.03)
UROBILINOGEN UR STRIP-MCNC: NORMAL MG/DL
WBC URINE: 54 /HPF

## 2024-12-16 PROCEDURE — 87088 URINE BACTERIA CULTURE: CPT | Mod: ZL

## 2024-12-16 PROCEDURE — 81001 URINALYSIS AUTO W/SCOPE: CPT | Mod: ZL

## 2024-12-16 PROCEDURE — 87086 URINE CULTURE/COLONY COUNT: CPT | Mod: ZL

## 2024-12-16 PROCEDURE — G0463 HOSPITAL OUTPT CLINIC VISIT: HCPCS

## 2024-12-16 PROCEDURE — 87186 SC STD MICRODIL/AGAR DIL: CPT | Mod: ZL

## 2024-12-16 ASSESSMENT — PAIN SCALES - GENERAL: PAINLEVEL_OUTOF10: MODERATE PAIN (4)

## 2024-12-16 NOTE — PROGRESS NOTES
ASSESSMENT/PLAN:    I have reviewed the nursing notes.  I have reviewed the findings, diagnosis, plan and need for follow up with the patient.    1. Urinary problem  2. Flank pain  3. Slightly cloudy urine    - UA Macroscopic with reflex to Microscopic and Culture-large amount of leukocytes, few bacteria, mucus present, WBC urine > 54.     - Urine Culture- results pending    4. Urinary tract infection without hematuria, site unspecified (Primary)    - No antibiotics prescribed at this time, will wait for culture sensitivity report due to minimally symptomatic at this time.    - Please read the attached information on urinary tract infections in females and bladder diet for at home care treatment.    - May use over-the-counter Tylenol and ibuprofen as needed for pain, inflammation or fever    - Discussed warning signs/symptoms indicative of need to f/u    - Follow up if symptoms persist or worsen or concerns    - I explained my diagnostic considerations and recommendations to the patient, who voiced understanding and agreement with the treatment plan. All questions were answered. We discussed potential side effects of any prescribed or recommended therapies, as well as expectations for response to treatments.    Nikky Moreno, ALICIA CNP  12/16/2024  2:30 PM    HPI:    Laura Romero is a 69 year old female who presents to [facility] today for concerns of possible UTI.  Patient states about a week ago had a tinge of a back ache.  Over the weekend states that she woke up with terrible back pain and urine was cloudy.   Also has urinary frequency.  Has history of kidney stones 20 years apart.  Last one was 2005.  Patient states that a few years ago the urologist had told her that she did have some kidney stones in her kidney that he was not concerned about, states that they would pass without any problems.  Patient states that she does not feel like this is a kidney stone issue.  Denies dysuria, urinary urgency or  incontinence, hematuria, foul-smelling urine, nausea, vomiting, diarrhea, constipation or rash.  At home care treatment consists of cranberry juice, and increased water intake.      Past Medical History:   Diagnosis Date    Bacteremia      12/04,Negative coag testing    Calculus of kidney     age 19 and age 50    Chronic sinusitis     5/20/2011    Deviated nasal septum     8/3/2012    Deviated nasal septum     sever turbinate hypertophy and superior septal deviation to right with recurrent sinusitis    Gastro-esophageal reflux disease without esophagitis     8/31/2009    Personal history of other medical treatment (CODE)     rectocele with cervical prolapse    Phlebitis and thrombophlebitis     1/6/11, after varicose vein ablation    Phlebitis and thrombophlebitis of lower extremities, unspecified (CODE)     1/6/2011,Greater saphenous vein left lower extremity    Pneumonia     7/27/2012     Past Surgical History:   Procedure Laterality Date    CATARACT EXTRACTION      left eye 1/10/2023 - Dr. Orellana,  right eye planned around 1/24/23.    COLONOSCOPY  10/13/2008    normal.  Next colonoscopy due in 2018.    COLONOSCOPY N/A 10/26/2020    F/U 2025 tubular adenoma    CV CORONARY ANGIOGRAM N/A 9/5/2023    Procedure: Coronary Angiogram;  Surgeon: Khai Adler MD;  Location:  [facility]    HYSTERECTOMY VAGINAL  04/27/2005    anterior repair with posterior perineorrhaphy and vaginal vault suspension    IR URETERAL STENT REMOVAL RIGHT  08/11/2005    ureteral Stent removal, uncertain laterality    LAPAROSCOPIC TUBAL LIGATION      No Comments Provided    OTHER SURGICAL HISTORY      ,LITHOTRIPSY, with stent placement for right ureteral stone    SEPTOPLASTY  08/2012    with turbinate reduction;  Dr. England    TONSILLECTOMY      No Comments Provided     Social History     Tobacco Use    Smoking status: Never     Passive exposure: Past    Smokeless tobacco: Never   Substance Use Topics    Alcohol use: Yes  "    Alcohol/week: 0.0 standard drinks of alcohol     Comment: Alcoholic Drinks/day: infrequent social     Current Outpatient Medications   Medication Sig Dispense Refill    acetaminophen (TYLENOL) 500 MG tablet Take 2 tablets (1,000 mg) by mouth every 6 hours as needed for mild pain      albuterol (PROAIR HFA/PROVENTIL HFA/VENTOLIN HFA) 108 (90 Base) MCG/ACT inhaler Inhale 2 puffs into the lungs every 4 hours as needed for shortness of breath or wheezing. Inhale 1-2 puff 18 g 11    cetirizine (ZYRTEC) 10 MG tablet Take 10 mg by mouth daily      fluticasone (FLONASE) 50 MCG/ACT nasal spray Spray 1 spray into both nostrils daily.      ibuprofen (ADVIL/MOTRIN) 200 MG tablet Take 200-400 mg by mouth every 8 hours as needed for pain      levothyroxine (SYNTHROID/LEVOTHROID) 100 MCG tablet Take 1 tablet (100 mcg) by mouth daily 90 tablet 3    lisinopril (ZESTRIL) 20 MG tablet Take 1 tablet (20 mg) by mouth daily 90 tablet 3    metroNIDAZOLE (METROGEL) 0.75 % external gel Apply topically 2 times daily as needed (rosacea)      Multiple Vitamin (MULTIVITAMIN) per tablet Take 1 tablet by mouth daily. Food      rosuvastatin (CRESTOR) 5 MG tablet Take 1 tablet (5 mg) by mouth daily 90 tablet 3    Vitamin D3 (CHOLECALCIFEROL) 25 mcg (1000 units) tablet Take 25 mcg by mouth daily      fluticasone (FLONASE) 50 MCG/ACT nasal spray Spray 1 spray into both nostrils daily as needed for allergies (Patient not taking: Reported on 12/16/2024)       Allergies   Allergen Reactions    Azithromycin Hives     Zithromax       Past medical history, past surgical history, current medications and allergies reviewed and accurate to the best of my knowledge.      ROS:  Refer to HPI    BP (!) 145/82 (BP Location: Left arm, Patient Position: Sitting, Cuff Size: Adult Regular)   Pulse 72   Temp 98.9  F (37.2  C) (Tympanic)   Resp 17   Ht 1.6 m (5' 3\")   Wt 100.2 kg (221 lb)   LMP 01/01/2005   SpO2 99%   BMI 39.15 kg/m      EXAM:  General " Appearance: Well appearing 69 year old female, appropriate appearance for age. No acute distress   Respiratory: normal chest wall and respirations.  Normal effort.  Clear to auscultation bilaterally, no wheezing, crackles or rhonchi.  No increased work of breathing.  No cough appreciated.  Cardiac: RRR with no murmurs  Abdomen: soft, nontender, no rigidity, no rebound tenderness or guarding, normal bowel sounds present  :  No suprapubic tenderness to palpation.  Mild CVA tenderness to palpation.    Musculoskeletal:  Equal movement of bilateral upper extremities.  Equal movement of bilateral lower extremities.  Normal gait.    Neuro: Alert and oriented to person, place, and time.   Psychological: normal affect, alert, oriented, and pleasant.     Labs:  Results for orders placed or performed in visit on 12/16/24   UA Macroscopic with reflex to Microscopic and Culture     Status: Abnormal    Specimen: Urine, Clean Catch   Result Value Ref Range    Color Urine Light Yellow Colorless, Straw, Light Yellow, Yellow    Appearance Urine Clear Clear    Glucose Urine Negative Negative mg/dL    Bilirubin Urine Negative Negative    Ketones Urine Negative Negative mg/dL    Specific Gravity Urine 1.009 1.000 - 1.030    Blood Urine Negative Negative    pH Urine 5.5 5.0 - 9.0    Protein Albumin Urine Negative Negative mg/dL    Urobilinogen Urine Normal Normal, 2.0 mg/dL    Nitrite Urine Negative Negative    Leukocyte Esterase Urine Large (A) Negative    Bacteria Urine Few (A) None Seen /HPF    Mucus Urine Present (A) None Seen /LPF    RBC Urine 1 <=2 /HPF    WBC Urine 54 (H) <=5 /HPF    Narrative    Urine Culture ordered based on laboratory criteria

## 2024-12-16 NOTE — PROGRESS NOTES
"Chief Complaint   Patient presents with    UTI     X7 days    Back Pain     lower   Patient is here today to be seen for UTI symptoms that started a week ago.    FOOD SECURITY SCREENING QUESTIONS  Hunger Vital Signs:  Within the past 12 months we worried whether our food would run out before we got money to buy more. Never  Within the past 12 months the food we bought just didn't last and we didn't have money to get more. Never  Chetna Roper 12/16/2024 2:23 PM      Initial BP (!) 145/82 (BP Location: Left arm, Patient Position: Sitting, Cuff Size: Adult Regular)   Pulse 72   Temp 98.9  F (37.2  C) (Tympanic)   Resp 17   Ht 1.6 m (5' 3\")   Wt 100.2 kg (221 lb)   LMP 01/01/2005   SpO2 99%   BMI 39.15 kg/m   Estimated body mass index is 39.15 kg/m  as calculated from the following:    Height as of this encounter: 1.6 m (5' 3\").    Weight as of this encounter: 100.2 kg (221 lb).  Medication Reconciliation: complete    Chetna Roper   "

## 2024-12-18 DIAGNOSIS — N30.00 ACUTE CYSTITIS WITHOUT HEMATURIA: Primary | ICD-10-CM

## 2024-12-18 LAB — BACTERIA UR CULT: ABNORMAL

## 2024-12-18 RX ORDER — CEFDINIR 300 MG/1
300 CAPSULE ORAL 2 TIMES DAILY
Qty: 14 CAPSULE | Refills: 0 | Status: SHIPPED | OUTPATIENT
Start: 2024-12-18 | End: 2024-12-25

## 2024-12-18 RX ORDER — NITROFURANTOIN 25; 75 MG/1; MG/1
100 CAPSULE ORAL 2 TIMES DAILY
Qty: 10 CAPSULE | Refills: 0 | Status: SHIPPED | OUTPATIENT
Start: 2024-12-18 | End: 2024-12-18

## 2024-12-18 NOTE — PATIENT INSTRUCTIONS
Urinary Tract Infection (UTI) in Women: Care Instructions  Overview     A urinary tract infection (UTI) is an infection caused by bacteria. It can happen anywhere in the urinary tract. A UTI can happen in the:  Kidneys.  Ureters, the tubes that connect the kidneys to the bladder.  Bladder.  Urethra, where the urine comes out.  Most UTIs are bladder infections. They often cause pain or burning when you urinate.  Most UTIs can be cured with antibiotics. If you are prescribed antibiotics, be sure to complete your treatment so that the infection does not get worse.  Follow-up care is a key part of your treatment and safety. Be sure to make and go to all appointments, and call your doctor if you are having problems. It's also a good idea to know your test results and keep a list of the medicines you take.  How can you care for yourself at home?  Take your antibiotics as directed. Do not stop taking them just because you feel better. You need to take the full course of antibiotics.  Drink extra water and other fluids for the next day or two. This will help make the urine less concentrated and help wash out the bacteria that are causing the infection. (If you have kidney, heart, or liver disease and have to limit fluids, talk with your doctor before you increase the amount of fluids you drink.)  Avoid drinks that are carbonated or have caffeine. They can irritate the bladder.  Urinate often. Try to empty your bladder each time.  To relieve pain, take a hot bath or lay a heating pad set on low over your lower belly or genital area. Never go to sleep with a heating pad in place.  To prevent UTIs  Drink plenty of water each day. This helps you urinate often, which clears bacteria from your system. (If you have kidney, heart, or liver disease and have to limit fluids, talk with your doctor before you increase the amount of fluids you drink.)  Urinate when you need to.  If you are sexually active, urinate right after you have  "sex.  Change sanitary pads often.  Avoid douches, bubble baths, feminine hygiene sprays, and other feminine hygiene products that have deodorants.  After going to the bathroom, wipe from front to back.  When should you call for help?   Call your doctor now or seek immediate medical care if:    You have new or worse fever, chills, nausea, or vomiting.     You have new pain in your back just below your rib cage. This is called flank pain.     There is new blood or pus in your urine.     You have any problems with your antibiotic medicine.   Watch closely for changes in your health, and be sure to contact your doctor if:    You are not getting better after taking an antibiotic for 2 days.     Your symptoms go away but then come back.   Where can you learn more?  Go to https://www.[employer]/patiented  Enter K848 in the search box to learn more about \"Urinary Tract Infection (UTI) in Women: Care Instructions.\"  Current as of: April 30, 2024  Content Version: 14.3    2024 [email or web address].   Care instructions adapted under license by your healthcare professional. If you have questions about a medical condition or this instruction, always ask your healthcare professional. [email or web address] disclaims any warranty or liability for your use of this information.    "

## 2024-12-24 ENCOUNTER — THERAPY VISIT (OUTPATIENT)
Dept: PHYSICAL THERAPY | Facility: OTHER | Age: 69
End: 2024-12-24
Attending: ORTHOPAEDIC SURGERY
Payer: COMMERCIAL

## 2024-12-24 DIAGNOSIS — Z87.81 S/P ORIF (OPEN REDUCTION INTERNAL FIXATION) FRACTURE: Primary | ICD-10-CM

## 2024-12-24 DIAGNOSIS — Z98.890 S/P ORIF (OPEN REDUCTION INTERNAL FIXATION) FRACTURE: Primary | ICD-10-CM

## 2024-12-27 PROBLEM — Z87.81 S/P ORIF (OPEN REDUCTION INTERNAL FIXATION) FRACTURE: Status: ACTIVE | Noted: 2024-12-27

## 2024-12-27 PROBLEM — Z98.890 S/P ORIF (OPEN REDUCTION INTERNAL FIXATION) FRACTURE: Status: ACTIVE | Noted: 2024-12-27

## 2024-12-31 ENCOUNTER — THERAPY VISIT (OUTPATIENT)
Dept: PHYSICAL THERAPY | Facility: OTHER | Age: 69
End: 2024-12-31
Attending: ORTHOPAEDIC SURGERY
Payer: MEDICARE

## 2024-12-31 DIAGNOSIS — Z98.890 S/P ORIF (OPEN REDUCTION INTERNAL FIXATION) FRACTURE: Primary | ICD-10-CM

## 2024-12-31 DIAGNOSIS — Z87.81 S/P ORIF (OPEN REDUCTION INTERNAL FIXATION) FRACTURE: Primary | ICD-10-CM

## 2025-01-08 ENCOUNTER — THERAPY VISIT (OUTPATIENT)
Dept: PHYSICAL THERAPY | Facility: OTHER | Age: 70
End: 2025-01-08
Attending: ORTHOPAEDIC SURGERY
Payer: COMMERCIAL

## 2025-01-08 DIAGNOSIS — Z98.890 S/P ORIF (OPEN REDUCTION INTERNAL FIXATION) FRACTURE: Primary | ICD-10-CM

## 2025-01-08 DIAGNOSIS — Z87.81 S/P ORIF (OPEN REDUCTION INTERNAL FIXATION) FRACTURE: Primary | ICD-10-CM

## 2025-01-13 ENCOUNTER — HOSPITAL ENCOUNTER (OUTPATIENT)
Dept: MAMMOGRAPHY | Facility: OTHER | Age: 70
Discharge: HOME | End: 2025-01-13
Attending: FAMILY MEDICINE | Admitting: FAMILY MEDICINE
Payer: MEDICARE

## 2025-01-13 DIAGNOSIS — Z12.31 VISIT FOR SCREENING MAMMOGRAM: ICD-10-CM

## 2025-01-13 PROCEDURE — 77063 BREAST TOMOSYNTHESIS BI: CPT

## 2025-01-13 PROCEDURE — 77067 SCR MAMMO BI INCL CAD: CPT

## 2025-01-14 ENCOUNTER — THERAPY VISIT (OUTPATIENT)
Dept: PHYSICAL THERAPY | Facility: OTHER | Age: 70
End: 2025-01-14
Attending: ORTHOPAEDIC SURGERY
Payer: MEDICARE

## 2025-01-14 DIAGNOSIS — Z98.890 S/P ORIF (OPEN REDUCTION INTERNAL FIXATION) FRACTURE: Primary | ICD-10-CM

## 2025-01-14 DIAGNOSIS — Z87.81 S/P ORIF (OPEN REDUCTION INTERNAL FIXATION) FRACTURE: Primary | ICD-10-CM

## 2025-01-14 PROCEDURE — 97140 MANUAL THERAPY 1/> REGIONS: CPT | Mod: GP | Performed by: PHYSICAL THERAPIST

## 2025-01-14 PROCEDURE — 97110 THERAPEUTIC EXERCISES: CPT | Mod: GP | Performed by: PHYSICAL THERAPIST

## 2025-01-21 ENCOUNTER — THERAPY VISIT (OUTPATIENT)
Dept: PHYSICAL THERAPY | Facility: OTHER | Age: 70
End: 2025-01-21
Attending: ORTHOPAEDIC SURGERY
Payer: MEDICARE

## 2025-01-21 DIAGNOSIS — G89.29 CHRONIC PAIN OF RIGHT KNEE: ICD-10-CM

## 2025-01-21 DIAGNOSIS — Z87.81 S/P ORIF (OPEN REDUCTION INTERNAL FIXATION) FRACTURE: Primary | ICD-10-CM

## 2025-01-21 DIAGNOSIS — Z98.890 S/P ORIF (OPEN REDUCTION INTERNAL FIXATION) FRACTURE: Primary | ICD-10-CM

## 2025-01-21 DIAGNOSIS — M25.561 CHRONIC PAIN OF RIGHT KNEE: ICD-10-CM

## 2025-01-28 ENCOUNTER — THERAPY VISIT (OUTPATIENT)
Dept: PHYSICAL THERAPY | Facility: OTHER | Age: 70
End: 2025-01-28
Attending: ORTHOPAEDIC SURGERY
Payer: MEDICARE

## 2025-01-28 DIAGNOSIS — Z98.890 S/P ORIF (OPEN REDUCTION INTERNAL FIXATION) FRACTURE: Primary | ICD-10-CM

## 2025-01-28 DIAGNOSIS — Z87.81 S/P ORIF (OPEN REDUCTION INTERNAL FIXATION) FRACTURE: Primary | ICD-10-CM

## 2025-01-28 PROCEDURE — 97110 THERAPEUTIC EXERCISES: CPT | Mod: GP | Performed by: PHYSICAL THERAPIST

## 2025-01-28 PROCEDURE — 97140 MANUAL THERAPY 1/> REGIONS: CPT | Mod: GP | Performed by: PHYSICAL THERAPIST

## 2025-02-10 ENCOUNTER — TRANSFERRED RECORDS (OUTPATIENT)
Dept: HEALTH INFORMATION MANAGEMENT | Facility: OTHER | Age: 70
End: 2025-02-10
Payer: COMMERCIAL

## 2025-02-14 DIAGNOSIS — I10 ESSENTIAL HYPERTENSION: ICD-10-CM

## 2025-02-14 DIAGNOSIS — E78.00 HYPERCHOLESTEROLEMIA: ICD-10-CM

## 2025-02-14 DIAGNOSIS — E03.9 HYPOTHYROIDISM, UNSPECIFIED TYPE: ICD-10-CM

## 2025-02-18 RX ORDER — LEVOTHYROXINE SODIUM 100 UG/1
100 TABLET ORAL DAILY
Qty: 90 TABLET | Refills: 3 | Status: SHIPPED | OUTPATIENT
Start: 2025-02-18

## 2025-02-18 RX ORDER — ROSUVASTATIN CALCIUM 5 MG/1
5 TABLET, COATED ORAL DAILY
Qty: 90 TABLET | Refills: 3 | Status: SHIPPED | OUTPATIENT
Start: 2025-02-18

## 2025-02-18 RX ORDER — LISINOPRIL 20 MG/1
20 TABLET ORAL DAILY
Qty: 90 TABLET | Refills: 3 | Status: SHIPPED | OUTPATIENT
Start: 2025-02-18

## 2025-02-18 NOTE — PROGRESS NOTES
Chief Complaint   Patient presents with    Ear Problem     Ear pain behind ear     Patient returns to ENT for ear pain, post auricular pain/ sinus pressure.   She had recent sinusitis this past November and then developed COVID following abx use.   She has felt increase in pain behind ears and at times do use cold/ ice packs. Symptoms had been present for a few weeks and has gradually been improving. Today, she feels the discomfort has overall resolved. She has felt increase in sinus/ nasal drainage.   No associated hearing changes. She did have some effusions when symptoms first began, but ears have been feeling fairly well.       Denies COM or otologic surgeries.   Denies otorrhea  Denies worrisome tinnitus  Denies fluctuating hearing loss or tinnitus.   Denies facial paraesthesia or vertigo.   Denies jaw pain, but may clench at times.     She has felt stuffiness bilaterally. She does use nasal saline at HS. She does use rinses PRN.   No nosebleeds.   Increase in sinus headaches/ migraines.   Hx of nasal septoplasty.  Hx of SCIT.   No blood thinners.       MQT- 10/3/19  Dilution #6-None  Dilution #5- Oak, Alternia, helmintherosoirum, dust.   Dilution #2- weeds, grass, trees, mold, cat, dog    Past Medical History:   Diagnosis Date    Bacteremia      12/04,Negative coag testing    Calculus of kidney     age 19 and age 50    Chronic sinusitis     5/20/2011    Deviated nasal septum     8/3/2012    Deviated nasal septum     sever turbinate hypertophy and superior septal deviation to right with recurrent sinusitis    Gastro-esophageal reflux disease without esophagitis     8/31/2009    Personal history of other medical treatment (CODE)     rectocele with cervical prolapse    Phlebitis and thrombophlebitis     1/6/11, after varicose vein ablation    Phlebitis and thrombophlebitis of lower extremities, unspecified (CODE)     1/6/2011,Greater saphenous vein left lower extremity    Pneumonia     7/27/2012        Allergies  "  Allergen Reactions    Azithromycin Hives     Zithromax       ROS- SEE HPI  /84 (BP Location: Right arm, Patient Position: Sitting, Cuff Size: Adult Large)   Pulse 71   Temp 97.1  F (36.2  C) (Tympanic)   Resp 16   Ht 1.6 m (5' 3\")   Wt 99.8 kg (220 lb)   LMP 01/01/2005   SpO2 98%   BMI 38.97 kg/m      General - The patient is well nourished and well developed, and appears to have good nutritional status.  Alert and oriented to person and place, answers questions and cooperates with examination appropriately.   Head and Face - Normocephalic and atraumatic, with no gross asymmetry noted.  The facial nerve is intact, with strong symmetric movements.  Voice and Breathing - The patient was breathing comfortably without the use of accessory muscles. There was no wheezing, stridor, or stertor.  The patients voice was clear and strong, and had appropriate pitch and quality.  Ears -ears examined with otoscope and under otologic microscopy.  The external auditory canals are patent, the tympanic membranes are intact without effusion, retraction or mass.  Bony landmarks are intact.  No postauricular pain palpated.  Mastoids and feet are nontender fluctuant.  No edema or erythema present.  Neck strap muscles mild tightness.  Eyes - Extraocular movements intact, and the pupils were reactive to light.  Sclera were not icteric or injected, conjunctiva were pink and moist.  Mouth - Examination of the oral cavity showed pink, healthy oral mucosa. No lesions or ulcerations noted.  The tongue was mobile and midline, and the dentition were in good condition.    Crowded posterior pharynx.  Throat - The walls of the oropharynx were smooth, pink, moist, symmetric, and had no lesions or ulcerations.  The tonsillar pillars and soft palate were symmetric.  The uvula was midline on elevation.    Neck - Normal midline excursion of the laryngotracheal complex during swallowing.  Full range of motion on passive movement.  " Palpation of the occipital, submental, submandibular, internal jugular chain, and supraclavicular nodes did not demonstrate any abnormal lymph nodes or masses.  Palpation of the thyroid was soft and smooth, with no nodules or goiter appreciated.  The trachea was mobile and midline. No palpable pain along postauricular region.         ASSESSMENT/ PLAN:      ICD-10-CM    1. Perennial allergic rhinitis  J30.89 budesonide (PULMICORT) 0.5 MG/2ML neb solution      2. Nasal congestion  R09.81 budesonide (PULMICORT) 0.5 MG/2ML neb solution      3. Post-nasal drainage  R09.82       4. Pain of postauricular region  R51.9             Ears appear clear. No effusion today  Her postauricular pain has improved at this time, will follow.   If pain returns consider imaging/ exam    She was cautioned on clenching/ warm compresses, etc.     Budesonide rinses refilled.   Nasal saline and Nasal Ayr gel for dryness.           Lorelei Guzman PA-C  ENT  [facility], [place]

## 2025-02-18 NOTE — TELEPHONE ENCOUNTER
[facility] sent Rx request for the following:      Requested Prescriptions   Pending Prescriptions Disp Refills    levothyroxine (SYNTHROID/LEVOTHROID) 100 MCG tablet [Pharmacy Med Name: LEVOTHYROXINE 0.100MG (100MCG) TAB] 90 tablet 3     Sig: TAKE 1 TABLET(100 MCG) BY MOUTH DAILY       Thyroid Protocol Passed - 2/18/2025  2:32 PM        Last Prescription Date:   2/27/2024  Last Fill Qty/Refills:         90, R-3    Last Office Visit:              2/27/2024   Future Office visit:           3/3/2025      Talk to patient and can wait till the appt for refill has enough at home.   Will route to pcp to review and approve because of upcoming visit. patient also said that she is low on lisinopril and rosuvastatin and those one she will run out of before visit I added them for the pcp to review.     Sanjay Riley RN on 2/18/2025 at 3:26 PM

## 2025-02-20 ENCOUNTER — OFFICE VISIT (OUTPATIENT)
Dept: OTOLARYNGOLOGY | Facility: OTHER | Age: 70
End: 2025-02-20
Attending: PHYSICIAN ASSISTANT
Payer: MEDICARE

## 2025-02-20 VITALS
WEIGHT: 220 LBS | RESPIRATION RATE: 16 BRPM | TEMPERATURE: 97.1 F | HEART RATE: 71 BPM | OXYGEN SATURATION: 98 % | SYSTOLIC BLOOD PRESSURE: 130 MMHG | BODY MASS INDEX: 38.98 KG/M2 | HEIGHT: 63 IN | DIASTOLIC BLOOD PRESSURE: 84 MMHG

## 2025-02-20 DIAGNOSIS — R09.81 NASAL CONGESTION: ICD-10-CM

## 2025-02-20 DIAGNOSIS — R09.82 POST-NASAL DRAINAGE: ICD-10-CM

## 2025-02-20 DIAGNOSIS — R51.9 PAIN OF POSTAURICULAR REGION: ICD-10-CM

## 2025-02-20 DIAGNOSIS — J30.89 PERENNIAL ALLERGIC RHINITIS: Primary | ICD-10-CM

## 2025-02-20 PROCEDURE — 92504 EAR MICROSCOPY EXAMINATION: CPT | Performed by: PHYSICIAN ASSISTANT

## 2025-02-20 PROCEDURE — G0463 HOSPITAL OUTPT CLINIC VISIT: HCPCS | Mod: 25

## 2025-02-20 RX ORDER — BUDESONIDE 0.5 MG/2ML
INHALANT ORAL
Qty: 240 ML | Refills: 2 | Status: SHIPPED | OUTPATIENT
Start: 2025-02-20

## 2025-02-20 ASSESSMENT — PAIN SCALES - GENERAL: PAINLEVEL_OUTOF10: MILD PAIN (2)

## 2025-02-20 NOTE — LETTER
2/20/2025      Laura Romero  [address] [place] [address]      Dear Colleague,    Thank you for referring your patient, Laura Romero, to the [facility]. Please see a copy of my visit note below.    Chief Complaint   Patient presents with     Ear Problem     Ear pain behind ear     Patient returns to ENT for ear pain, post auricular pain/ sinus pressure.   She had recent sinusitis this past November and then developed COVID following abx use.   She has felt increase in pain behind ears and at times do use cold/ ice packs. Symptoms had been present for a few weeks and has gradually been improving. Today, she feels the discomfort has overall resolved. She has felt increase in sinus/ nasal drainage.   No associated hearing changes. She did have some effusions when symptoms first began, but ears have been feeling fairly well.       Denies COM or otologic surgeries.   Denies otorrhea  Denies worrisome tinnitus  Denies fluctuating hearing loss or tinnitus.   Denies facial paraesthesia or vertigo.   Denies jaw pain, but may clench at times.     She has felt stuffiness bilaterally. She does use nasal saline at HS. She does use rinses PRN.   No nosebleeds.   Increase in sinus headaches/ migraines.   Hx of nasal septoplasty.  Hx of SCIT.   No blood thinners.       MQT- 10/3/19  Dilution #6-None  Dilution #5- Oak, Alternia, helmintherosoirum, dust.   Dilution #2- weeds, grass, trees, mold, cat, dog    Past Medical History:   Diagnosis Date     Bacteremia      12/04,Negative coag testing     Calculus of kidney     age 19 and age 50     Chronic sinusitis     5/20/2011     Deviated nasal septum     8/3/2012     Deviated nasal septum     sever turbinate hypertophy and superior septal deviation to right with recurrent sinusitis     Gastro-esophageal reflux disease without esophagitis     8/31/2009     Personal history of other medical treatment (CODE)     rectocele with cervical prolapse  "    Phlebitis and thrombophlebitis     1/6/11, after varicose vein ablation     Phlebitis and thrombophlebitis of lower extremities, unspecified (CODE)     1/6/2011,Greater saphenous vein left lower extremity     Pneumonia     7/27/2012        Allergies   Allergen Reactions     Azithromycin Hives     Zithromax       ROS- SEE HPI  /84 (BP Location: Right arm, Patient Position: Sitting, Cuff Size: Adult Large)   Pulse 71   Temp 97.1  F (36.2  C) (Tympanic)   Resp 16   Ht 1.6 m (5' 3\")   Wt 99.8 kg (220 lb)   LMP 01/01/2005   SpO2 98%   BMI 38.97 kg/m      General - The patient is well nourished and well developed, and appears to have good nutritional status.  Alert and oriented to person and place, answers questions and cooperates with examination appropriately.   Head and Face - Normocephalic and atraumatic, with no gross asymmetry noted.  The facial nerve is intact, with strong symmetric movements.  Voice and Breathing - The patient was breathing comfortably without the use of accessory muscles. There was no wheezing, stridor, or stertor.  The patients voice was clear and strong, and had appropriate pitch and quality.  Ears -ears examined with otoscope and under otologic microscopy.  The external auditory canals are patent, the tympanic membranes are intact without effusion, retraction or mass.  Bony landmarks are intact.  No postauricular pain palpated.  Mastoids and feet are nontender fluctuant.  No edema or erythema present.  Neck strap muscles mild tightness.  Eyes - Extraocular movements intact, and the pupils were reactive to light.  Sclera were not icteric or injected, conjunctiva were pink and moist.  Mouth - Examination of the oral cavity showed pink, healthy oral mucosa. No lesions or ulcerations noted.  The tongue was mobile and midline, and the dentition were in good condition.    Crowded posterior pharynx.  Throat - The walls of the oropharynx were smooth, pink, moist, symmetric, and had " no lesions or ulcerations.  The tonsillar pillars and soft palate were symmetric.  The uvula was midline on elevation.    Neck - Normal midline excursion of the laryngotracheal complex during swallowing.  Full range of motion on passive movement.  Palpation of the occipital, submental, submandibular, internal jugular chain, and supraclavicular nodes did not demonstrate any abnormal lymph nodes or masses.  Palpation of the thyroid was soft and smooth, with no nodules or goiter appreciated.  The trachea was mobile and midline. No palpable pain along postauricular region.         ASSESSMENT/ PLAN:      ICD-10-CM    1. Perennial allergic rhinitis  J30.89 budesonide (PULMICORT) 0.5 MG/2ML neb solution      2. Nasal congestion  R09.81 budesonide (PULMICORT) 0.5 MG/2ML neb solution      3. Post-nasal drainage  R09.82       4. Pain of postauricular region  R51.9             Ears appear clear. No effusion today  Her postauricular pain has improved at this time, will follow.   If pain returns consider imaging/ exam    She was cautioned on clenching/ warm compresses, etc.     Budesonide rinses refilled.   Nasal saline and Nasal Ayr gel for dryness.           Lorelei Guzman PA-C  ENT  [facility], [place]      Again, thank you for allowing me to participate in the care of your patient.        Sincerely,        Lorelei Guzman PA-C    Electronically signed

## 2025-02-20 NOTE — PATIENT INSTRUCTIONS
Ears look well.   No fluid or infection.   If pain develops/ return- Consider imaging    Use Budesonide rinses. Rinse 1-2 times daily/ as needed.   Continue with nasal saline  Use Nasal Ayr gel as needed for dryness.     Thank you for allowing EDIT[facility]uero[facility] and our ENT team to participate in your care.  If your medications are too expensive, please give the nurse a call.  We can possibly change this medication.  If you have a scheduling or an appointment question please contact our Health Unit Coordinator at their direct line [phone].   ALL nursing questions or concerns can be directed to your ENT nurse, Mariely at: [phone].      Budesonide nasal saline irrigation per instructions:  -Obtain [facility] Sinus rinse over the counter.    -Use warm distilled water and 2 packets of the salt solution that comes with the bottle, dissolve in bottle up to the 240 mL renata.  -Add 1 vial of budesonide.  -Irrigate each side of your nose leaning over the sink, using 1/3 to 1/2 the volume of the bottle in each nostril every irrigation.  Irrigate 2 times daily.  -If additional rinses are needed/recommended, you may use the plan [facility] Sinus irrigation without the use of added budesonide

## 2025-02-26 SDOH — HEALTH STABILITY: PHYSICAL HEALTH: ON AVERAGE, HOW MANY MINUTES DO YOU ENGAGE IN EXERCISE AT THIS LEVEL?: 40 MIN

## 2025-02-26 SDOH — HEALTH STABILITY: PHYSICAL HEALTH: ON AVERAGE, HOW MANY DAYS PER WEEK DO YOU ENGAGE IN MODERATE TO STRENUOUS EXERCISE (LIKE A BRISK WALK)?: 3 DAYS

## 2025-02-26 ASSESSMENT — ASTHMA QUESTIONNAIRES
QUESTION_4 LAST FOUR WEEKS HOW OFTEN HAVE YOU USED YOUR RESCUE INHALER OR NEBULIZER MEDICATION (SUCH AS ALBUTEROL): NOT AT ALL
QUESTION_5 LAST FOUR WEEKS HOW WOULD YOU RATE YOUR ASTHMA CONTROL: COMPLETELY CONTROLLED
QUESTION_3 LAST FOUR WEEKS HOW OFTEN DID YOUR ASTHMA SYMPTOMS (WHEEZING, COUGHING, SHORTNESS OF BREATH, CHEST TIGHTNESS OR PAIN) WAKE YOU UP AT NIGHT OR EARLIER THAN USUAL IN THE MORNING: NOT AT ALL
ACT_TOTALSCORE: 25
ACT_TOTALSCORE: 25
QUESTION_1 LAST FOUR WEEKS HOW MUCH OF THE TIME DID YOUR ASTHMA KEEP YOU FROM GETTING AS MUCH DONE AT WORK, SCHOOL OR AT HOME: NONE OF THE TIME
QUESTION_2 LAST FOUR WEEKS HOW OFTEN HAVE YOU HAD SHORTNESS OF BREATH: NOT AT ALL

## 2025-02-26 ASSESSMENT — SOCIAL DETERMINANTS OF HEALTH (SDOH): HOW OFTEN DO YOU GET TOGETHER WITH FRIENDS OR RELATIVES?: TWICE A WEEK

## 2025-03-03 ENCOUNTER — OFFICE VISIT (OUTPATIENT)
Dept: INTERNAL MEDICINE | Facility: OTHER | Age: 70
End: 2025-03-03
Attending: INTERNAL MEDICINE
Payer: COMMERCIAL

## 2025-03-03 VITALS
BODY MASS INDEX: 38.76 KG/M2 | HEART RATE: 76 BPM | DIASTOLIC BLOOD PRESSURE: 86 MMHG | OXYGEN SATURATION: 98 % | SYSTOLIC BLOOD PRESSURE: 144 MMHG | HEIGHT: 64 IN | RESPIRATION RATE: 14 BRPM | WEIGHT: 227 LBS

## 2025-03-03 DIAGNOSIS — E66.01 CLASS 2 SEVERE OBESITY WITH SERIOUS COMORBIDITY AND BODY MASS INDEX (BMI) OF 36.0 TO 36.9 IN ADULT, UNSPECIFIED OBESITY TYPE (H): ICD-10-CM

## 2025-03-03 DIAGNOSIS — Z00.00 ENCOUNTER FOR MEDICARE ANNUAL WELLNESS EXAM: Primary | ICD-10-CM

## 2025-03-03 DIAGNOSIS — I10 PRIMARY HYPERTENSION: ICD-10-CM

## 2025-03-03 DIAGNOSIS — E66.812 CLASS 2 SEVERE OBESITY WITH SERIOUS COMORBIDITY AND BODY MASS INDEX (BMI) OF 36.0 TO 36.9 IN ADULT, UNSPECIFIED OBESITY TYPE (H): ICD-10-CM

## 2025-03-03 DIAGNOSIS — E03.9 HYPOTHYROIDISM, UNSPECIFIED TYPE: ICD-10-CM

## 2025-03-03 DIAGNOSIS — E78.00 HYPERCHOLESTEROLEMIA: ICD-10-CM

## 2025-03-03 DIAGNOSIS — Z13.1 SCREENING FOR DIABETES MELLITUS: ICD-10-CM

## 2025-03-03 PROBLEM — M19.071 ARTHRITIS OF RIGHT ANKLE: Status: RESOLVED | Noted: 2024-02-27 | Resolved: 2025-03-03

## 2025-03-03 PROBLEM — S42.309A HUMERUS FRACTURE: Status: RESOLVED | Noted: 2024-07-29 | Resolved: 2025-03-03

## 2025-03-03 PROBLEM — Z86.0101 H/O ADENOMATOUS POLYP OF COLON: Status: RESOLVED | Noted: 2020-10-26 | Resolved: 2025-03-03

## 2025-03-03 PROBLEM — E78.5 HYPERLIPIDEMIA LDL GOAL <70: Status: RESOLVED | Noted: 2018-02-08 | Resolved: 2025-03-03

## 2025-03-03 PROBLEM — R94.39 ABNORMAL CARDIOVASCULAR STRESS TEST: Status: RESOLVED | Noted: 2023-09-05 | Resolved: 2025-03-03

## 2025-03-03 PROBLEM — I83.90 ASYMPTOMATIC VARICOSE VEINS: Status: RESOLVED | Noted: 2018-02-08 | Resolved: 2025-03-03

## 2025-03-03 PROBLEM — Z87.81 S/P ORIF (OPEN REDUCTION INTERNAL FIXATION) FRACTURE: Status: RESOLVED | Noted: 2024-12-27 | Resolved: 2025-03-03

## 2025-03-03 PROBLEM — Z98.890 S/P ORIF (OPEN REDUCTION INTERNAL FIXATION) FRACTURE: Status: RESOLVED | Noted: 2024-12-27 | Resolved: 2025-03-03

## 2025-03-03 PROBLEM — R07.89 CHEST HEAVINESS: Status: RESOLVED | Noted: 2023-09-05 | Resolved: 2025-03-03

## 2025-03-03 PROBLEM — S42.291A HUMERAL HEAD FRACTURE, RIGHT, CLOSED, INITIAL ENCOUNTER: Status: RESOLVED | Noted: 2024-07-29 | Resolved: 2025-03-03

## 2025-03-03 PROBLEM — Z98.890 S/P CORONARY ANGIOGRAM: Status: RESOLVED | Noted: 2023-09-05 | Resolved: 2025-03-03

## 2025-03-03 LAB
ALBUMIN SERPL BCG-MCNC: 4.5 G/DL (ref 3.5–5.2)
ALP SERPL-CCNC: 84 U/L (ref 40–150)
ALT SERPL W P-5'-P-CCNC: 20 U/L (ref 0–50)
ANION GAP SERPL CALCULATED.3IONS-SCNC: 8 MMOL/L (ref 7–15)
AST SERPL W P-5'-P-CCNC: 23 U/L (ref 0–45)
BILIRUB SERPL-MCNC: 0.5 MG/DL
BUN SERPL-MCNC: 22.3 MG/DL (ref 8–23)
CALCIUM SERPL-MCNC: 9.2 MG/DL (ref 8.8–10.4)
CHLORIDE SERPL-SCNC: 106 MMOL/L (ref 98–107)
CHOLEST SERPL-MCNC: 223 MG/DL
CREAT SERPL-MCNC: 0.7 MG/DL (ref 0.51–0.95)
EGFRCR SERPLBLD CKD-EPI 2021: >90 ML/MIN/1.73M2
ERYTHROCYTE [DISTWIDTH] IN BLOOD BY AUTOMATED COUNT: 15.4 % (ref 10–15)
FASTING STATUS PATIENT QL REPORTED: YES
FASTING STATUS PATIENT QL REPORTED: YES
GLUCOSE SERPL-MCNC: 93 MG/DL (ref 70–99)
HCO3 SERPL-SCNC: 28 MMOL/L (ref 22–29)
HCT VFR BLD AUTO: 42 % (ref 35–47)
HDLC SERPL-MCNC: 77 MG/DL
HGB BLD-MCNC: 14.2 G/DL (ref 11.7–15.7)
LDLC SERPL CALC-MCNC: 128 MG/DL
MCH RBC QN AUTO: 30.9 PG (ref 26.5–33)
MCHC RBC AUTO-ENTMCNC: 33.8 G/DL (ref 31.5–36.5)
MCV RBC AUTO: 92 FL (ref 78–100)
NONHDLC SERPL-MCNC: 146 MG/DL
PLATELET # BLD AUTO: 252 10E3/UL (ref 150–450)
POTASSIUM SERPL-SCNC: 4.5 MMOL/L (ref 3.4–5.3)
PROT SERPL-MCNC: 7.3 G/DL (ref 6.4–8.3)
RBC # BLD AUTO: 4.59 10E6/UL (ref 3.8–5.2)
SODIUM SERPL-SCNC: 142 MMOL/L (ref 135–145)
TRIGL SERPL-MCNC: 88 MG/DL
WBC # BLD AUTO: 6.9 10E3/UL (ref 4–11)

## 2025-03-03 PROCEDURE — 85027 COMPLETE CBC AUTOMATED: CPT | Mod: ZL | Performed by: INTERNAL MEDICINE

## 2025-03-03 PROCEDURE — G2211 COMPLEX E/M VISIT ADD ON: HCPCS | Performed by: INTERNAL MEDICINE

## 2025-03-03 PROCEDURE — 3079F DIAST BP 80-89 MM HG: CPT | Performed by: INTERNAL MEDICINE

## 2025-03-03 PROCEDURE — G0439 PPPS, SUBSEQ VISIT: HCPCS | Performed by: INTERNAL MEDICINE

## 2025-03-03 PROCEDURE — 1125F AMNT PAIN NOTED PAIN PRSNT: CPT | Performed by: INTERNAL MEDICINE

## 2025-03-03 PROCEDURE — 82947 ASSAY GLUCOSE BLOOD QUANT: CPT | Mod: ZL | Performed by: INTERNAL MEDICINE

## 2025-03-03 PROCEDURE — 99214 OFFICE O/P EST MOD 30 MIN: CPT | Mod: 25 | Performed by: INTERNAL MEDICINE

## 2025-03-03 PROCEDURE — 84132 ASSAY OF SERUM POTASSIUM: CPT | Mod: ZL | Performed by: INTERNAL MEDICINE

## 2025-03-03 PROCEDURE — G0463 HOSPITAL OUTPT CLINIC VISIT: HCPCS

## 2025-03-03 PROCEDURE — 3077F SYST BP >= 140 MM HG: CPT | Performed by: INTERNAL MEDICINE

## 2025-03-03 PROCEDURE — 36415 COLL VENOUS BLD VENIPUNCTURE: CPT | Mod: ZL | Performed by: INTERNAL MEDICINE

## 2025-03-03 PROCEDURE — 82465 ASSAY BLD/SERUM CHOLESTEROL: CPT | Mod: ZL | Performed by: INTERNAL MEDICINE

## 2025-03-03 ASSESSMENT — PAIN SCALES - GENERAL: PAINLEVEL_OUTOF10: MILD PAIN (1)

## 2025-03-03 NOTE — PROGRESS NOTES
Preventive Care Visit  [facility]  Susanne Gibbs DO, Internal Medicine  Mar 3, 2025  {Provider  Link to SmartSet :274011}    {PROVIDER CHARTING PREFERENCE:533216}    Romain Sanchez is a 70 year old, presenting for the following:  Medicare Annual Exam        3/3/2025     8:59 AM   Additional Questions   Roomed by LAURENCE Kimble     {ROOMER if patient is in their first year of Medicare a vision screen is required click here to document the Vison screen and then refresh the note to pull in results  :393429}      HPI  ***   {MA/LPN/RN Pre-Provider Visit Orders- hCG/UA/Strep (Optional):525941}  {SUPERLIST (Optional):441845}  {additonal problems for provider to add (Optional):996315}  Advance Care Planning  Patient does not have a Health Care Directive: {ADVANCE_DIRECTIVE_STATUS:411746}      2/26/2025   General Health   How would you rate your overall physical health? Good   Feel stress (tense, anxious, or unable to sleep) Only a little   (!) STRESS CONCERN      2/26/2025   Nutrition   Diet: Regular (no restrictions)         2/26/2025   Exercise   Days per week of moderate/strenous exercise 3 days   Average minutes spent exercising at this level 40 min         2/26/2025   Social Factors   Frequency of gathering with friends or relatives Twice a week   Worry food won't last until get money to buy more No   Food not last or not have enough money for food? No   Do you have housing? (Housing is defined as stable permanent housing and does not include staying ouside in a car, in a tent, in an abandoned building, in an overnight shelter, or couch-surfing.) Yes   Are you worried about losing your housing? No   Lack of transportation? No   Unable to get utilities (heat,electricity)? No         2/26/2025   Fall Risk   Fallen 2 or more times in the past year? No    No   Trouble with walking or balance? No    No       Multiple values from one day are sorted in reverse-chronological order          2/26/2025    Activities of Daily Living- Home Safety   Needs help with the following daily activites None of the above   Safety concerns in the home None of the above         2/26/2025   Dental   Dentist two times every year? Yes         2/26/2025   Hearing Screening   Hearing concerns? None of the above         2/26/2025   Driving Risk Screening   Patient/family members have concerns about driving No         2/26/2025   General Alertness/Fatigue Screening   Have you been more tired than usual lately? No         2/26/2025   Urinary Incontinence Screening   Bothered by leaking urine in past 6 months No         2/26/2024   TB Screening   Were you born outside of the [place]? No         Today's PHQ-2 Score:       3/2/2025     6:38 PM   PHQ-2 ( 1999 [employer])   Q1: Little interest or pleasure in doing things 0   Q2: Feeling down, depressed or hopeless 0   PHQ-2 Score 0    Q1: Little interest or pleasure in doing things Not at all   Q2: Feeling down, depressed or hopeless Not at all   PHQ-2 Score 0       Patient-reported           2/26/2025   Substance Use   Alcohol more than 3/day or more than 7/wk No   Do you have a current opioid prescription? No   How severe/bad is pain from 1 to 10? 2/10   Do you use any other substances recreationally? No     Social History     Tobacco Use    Smoking status: Never     Passive exposure: Past    Smokeless tobacco: Never   Vaping Use    Vaping status: Never Used   Substance Use Topics    Alcohol use: Yes     Alcohol/week: 0.0 standard drinks of alcohol     Comment: Alcoholic Drinks/day: infrequent social    Drug use: Never     {Provider  If there are gaps in the social history shown above, please follow the link to update and then refresh the note Link to Social and Substance History :355120}      1/13/2025   LAST FHS-7 RESULTS   1st degree relative breast or ovarian cancer No   Any relative bilateral breast cancer No   Any male have breast cancer No   Any ONE woman have BOTH breast AND ovarian cancer  No   Any woman with breast cancer before 50yrs No   2 or more relatives with breast AND/OR ovarian cancer No   2 or more relatives with breast AND/OR bowel cancer No     {If any of the questions to the FHS7 are answered yes, consider referral for genetic counseling.    Additional indications for genetic referral include personal history of breast or ovarian cancer, genetic mutation in 1st degree relative which increases risk of breast cancer including BRCA1, BRCA2, RAÚL, PALB 2, TP53, CHEK2, PTEN, CDH1, STK11 (per ACS) and/or 1st degree relative with history of pancreatic or high-risk prostate cancer (per NCCN):968623}   {Mammogram Decision Support (Optional):524192}      History of abnormal Pap smear: { :486350}       ASCVD Risk   The 10-year ASCVD risk score (Yogi ALMARAZ, et al., 2019) is: 14.8%    Values used to calculate the score:      Age: 70 years      Sex: Female      Is Non- : No      Diabetic: No      Tobacco smoker: No      Systolic Blood Pressure: 144 mmHg      Is BP treated: Yes      HDL Cholesterol: 65 mg/dL      Total Cholesterol: 179 mg/dL    {Link to Fracture Risk Assessment Tool (Optional):838913}    {Provider  REQUIRED FOR AWV Use the storyboard to review patient history, after sections have been marked as reviewed, refresh note to capture documentation:820356}  {Provider   REQUIRED AWV use this link to review and update sexual activity history  after section has been marked as reviewed, refresh note to capture documentation:138094}  Reviewed and updated as needed this visit by Provider   Tobacco   Meds  Problems  Med Hx  Surg Hx  Fam Hx  Soc Hx Sexual   Activity          Current Outpatient Medications   Medication Sig Dispense Refill    acetaminophen (TYLENOL) 500 MG tablet Take 2 tablets (1,000 mg) by mouth every 6 hours as needed for mild pain      budesonide (PULMICORT) 0.5 MG/2ML neb solution Make 240 cc Sammy med sinus irrigation Mix 2 ml vial of  budesonide 0.5 mg Rinse 1-2 times daily 240 mL 2    cetirizine (ZYRTEC) 10 MG tablet Take 10 mg by mouth daily      fluticasone (FLONASE) 50 MCG/ACT nasal spray Spray 1 spray into both nostrils daily as needed for allergies.      ibuprofen (ADVIL/MOTRIN) 200 MG tablet Take 200-400 mg by mouth every 8 hours as needed for pain      levothyroxine (SYNTHROID/LEVOTHROID) 100 MCG tablet TAKE 1 TABLET(100 MCG) BY MOUTH DAILY 90 tablet 3    lisinopril (ZESTRIL) 20 MG tablet Take 1 tablet (20 mg) by mouth daily. 90 tablet 3    metroNIDAZOLE (METROGEL) 0.75 % external gel Apply topically 2 times daily as needed (rosacea)      Multiple Vitamin (MULTIVITAMIN) per tablet Take 1 tablet by mouth daily. Food      rosuvastatin (CRESTOR) 5 MG tablet Take 1 tablet (5 mg) by mouth daily. 90 tablet 3    Vitamin D3 (CHOLECALCIFEROL) 25 mcg (1000 units) tablet Take 25 mcg by mouth daily      albuterol (PROAIR HFA/PROVENTIL HFA/VENTOLIN HFA) 108 (90 Base) MCG/ACT inhaler Inhale 2 puffs into the lungs every 4 hours as needed for shortness of breath or wheezing. Inhale 1-2 puff (Patient not taking: Reported on 3/3/2025) 18 g 11     Current providers sharing in care for this patient include:  Patient Care Team:  Susanne Gibbs DO as PCP - General (Internal Medicine)  Gloria Mcnally MD as Assigned PCP  Sabiha Lakhani APRN CNP as Assigned Heart and Vascular Provider  Gwyn Hairston MD as Assigned Musculoskeletal Provider    The following health maintenance items are reviewed in Epic and correct as of today:  Health Maintenance   Topic Date Due    COVID-19 Vaccine (7 - 2024-25 season) 09/01/2024    ASTHMA ACTION PLAN  01/19/2025    LIPID  02/27/2025    TSH W/FREE T4 REFLEX  06/10/2025    BMP  07/28/2025    ASTHMA CONTROL TEST  09/03/2025    COLORECTAL CANCER SCREENING  10/26/2025    MEDICARE ANNUAL WELLNESS VISIT  03/03/2026    FALL RISK ASSESSMENT  03/03/2026    MAMMO SCREENING  01/13/2027    GLUCOSE  07/28/2027     "DTAP/TDAP/TD IMMUNIZATION (3 - Td or Tdap) 10/26/2027    ADVANCE CARE PLANNING  03/03/2030    DEXA  10/20/2035    HEPATITIS C SCREENING  Completed    PHQ-2 (once per calendar year)  Completed    INFLUENZA VACCINE  Completed    Pneumococcal Vaccine: 50+ Years  Completed    ZOSTER IMMUNIZATION  Completed    RSV VACCINE  Completed    HPV IMMUNIZATION  Aged Out    MENINGITIS IMMUNIZATION  Aged Out       ROS:  CONSTITUTIONAL: Negative for fever, chills, night sweats, significant change in weight***  INTEGUMENTARY/SKIN/LYMPH: Negative for worrisome rashes, moles or lesions; swollen lymph nodes - sees Derm yearly  EYES: Negative for significant vision changes or irritation  ENT: Negative for additional ear, mouth and throat problems  RESP: Negative for significant cough or SOB  CV: Negative for chest pain, palpitations or increased peripheral edema  GI: Negative for abdominal pain, or change in bowel habits or blood in the stools/black stools  : Negative for unusual urinary or vaginal symptoms. No vaginal bleeding.  MUSCULOSKELETAL: Chronic right ankle pain and right knee pain; ongoing issues after humerus fractures  NEURO: Negative for new headaches, weakness or paraesthesias  PSYCHIATRIC: Negative for changes in mood or affect; significant anxiety or depression       Objective    Exam  BP (!) 144/86   Pulse 76   Resp 14   Ht 1.613 m (5' 3.5\")   Wt 103 kg (227 lb)   LMP 01/01/2005   SpO2 98%   Breastfeeding No   BMI 39.58 kg/m     Estimated body mass index is 39.58 kg/m  as calculated from the following:    Height as of this encounter: 1.613 m (5' 3.5\").    Weight as of this encounter: 103 kg (227 lb).    Physical Exam  GEN: Vitals reviewed. Healthy appearing. Patient is in no acute distress. Cooperative with exam.  HEENT: Normocephalic atraumatic.  Eyes grossly normal to inspection.  No discharge or erythema, or obvious scleral/conjunctival abnormalities. Oropharynx with no erythema or exudates. Dentition " adequate.    NECK: Supple; no thyromegaly or masses noted.  No cervical or supraclavicular lymphadenopathy.  CV: Heart regular in rate and rhythm with no murmur.    LUNGS: No audible wheeze, cough, or visible cyanosis.  No visible retractions or increased work of breathing.  Lungs clear to auscultation bilaterally.    ABD:  Obese, nondistended  SKIN: Warm and dry to touch.  Visible skin clear. No significant rash, abnormal pigmentation or lesions.  EXT: No clubbing or cyanosis.  No peripheral edema.  NEURO: Alert and oriented to person, place, and time.  Cranial nerves II-XII grossly intact with no focal or lateralizing deficits.  Muscle tone normal.  Gait normal. No tremor.   MSK: ROM of upper and lower ext symmetric and full.  PSYCH: Mood is good.  Mentation appears normal, affect normal/bright, judgement and insight intact, normal speech and appearance well-groomed.          3/3/2025   Mini Cog   Clock Draw Score 2 Normal   3 Item Recall 3 objects recalled   Mini Cog Total Score 5     {A Mini-Cog total score of 0-2 suggests the possibility of dementia, score of 3-5 suggests no dementia:440702}       The longitudinal plan of care for the diagnosis(es)/condition(s) as documented were addressed during this visit. Due to the added complexity in care, I will continue to support Larua in the subsequent management and with ongoing continuity of care.    Signed Electronically by: Susanne Gibbs DO  {Email feedback regarding this note to [email or web address]   :583655}   abnormal pigmentation or lesions.  EXT: No clubbing or cyanosis.  No peripheral edema.  NEURO: Alert and oriented to person, place, and time.  Cranial nerves II-XII grossly intact with no focal or lateralizing deficits.  Muscle tone normal.  Gait normal. No tremor.   MSK: ROM of upper and lower ext symmetric and full.  PSYCH: Mood is good.  Mentation appears normal, affect normal/bright, judgement and insight intact, normal speech and appearance well-groomed.          3/3/2025   Mini Cog   Clock Draw Score 2 Normal   3 Item Recall 3 objects recalled   Mini Cog Total Score 5            The longitudinal plan of care for the diagnosis(es)/condition(s) as documented were addressed during this visit. Due to the added complexity in care, I will continue to support Laura in the subsequent management and with ongoing continuity of care.    Signed Electronically by: Susanne Gibbs DO

## 2025-03-03 NOTE — NURSING NOTE
"Chief Complaint   Patient presents with    Medicare Annual Exam       Initial BP (!) 144/86   Pulse 76   Resp 14   Ht 1.613 m (5' 3.5\")   Wt 103 kg (227 lb)   LMP 01/01/2005   SpO2 98%   Breastfeeding No   BMI 39.58 kg/m   Estimated body mass index is 39.58 kg/m  as calculated from the following:    Height as of this encounter: 1.613 m (5' 3.5\").    Weight as of this encounter: 103 kg (227 lb).  Medication Review: complete    The next two questions are to help us understand your food security.  If you are feeling you need any assistance in this area, we have resources available to support you today.          2/26/2025   SDOH- Food Insecurity   Within the past 12 months, did you worry that your food would run out before you got money to buy more? N   Within the past 12 months, did the food you bought just not last and you didn t have money to get more? N         Health Care Directive:  Patient does not have a Health Care Directive: Discussed advance care planning with patient; however, patient declined at this time.    Selene Calvillo LPN      "

## 2025-03-10 ENCOUNTER — MYC MEDICAL ADVICE (OUTPATIENT)
Dept: INTERNAL MEDICINE | Facility: OTHER | Age: 70
End: 2025-03-10
Payer: COMMERCIAL

## 2025-03-10 DIAGNOSIS — E78.00 HYPERCHOLESTEROLEMIA: Primary | ICD-10-CM

## 2025-03-10 RX ORDER — ROSUVASTATIN CALCIUM 10 MG/1
10 TABLET, COATED ORAL EVERY OTHER DAY
Qty: 90 TABLET | Refills: 4 | Status: SHIPPED | OUTPATIENT
Start: 2025-03-10 | End: 2025-03-12

## 2025-03-10 NOTE — TELEPHONE ENCOUNTER
Pt wondering about taking 5mg every other day and 10mg every other day to make sure it's tolerated.      Currently on 5mg Rosuvastatin daily.      Huan'd up 10mg.      My Thoughts:  This would be a good plan.  Do 10mg every other day and 5 mg every other day for 2 weeks and, if tolerating, can increase to 10mg daily.  Sending in script for 10mg daily.    ______________________________________________________    3/3/35  Lipid Profile.        Dear Laura,  In follow up to your recent testing, results are all normal/stable from prior for the most part. Cholesterol is higher than prior. I would recommend we consider increasing your dose of cholesterol medication if possible. We could do 10mg daily or every other day (which is sometimes better tolerated). If you are interested in this, please let me know and I can send in a new order. Please call with any questions or concerns     Susanne Gibbs DO   Written by Susanne Gibbs DO on 3/6/2025  8:19 PM CST  Seen by patient Laura TATE Nick on 3/10/2025  3:20 PM    Clau Pierre RN on 3/10/2025 at 3:37 PM

## 2025-03-10 NOTE — TELEPHONE ENCOUNTER
New prescription sent in.  Recommend repeat fasting lipids and liver enzymes in approximately 4 to 6 weeks.

## 2025-03-11 DIAGNOSIS — E78.00 HYPERCHOLESTEROLEMIA: ICD-10-CM

## 2025-03-12 RX ORDER — ROSUVASTATIN CALCIUM 10 MG/1
TABLET, COATED ORAL
Qty: 112 TABLET | Refills: 0 | Status: SHIPPED | OUTPATIENT
Start: 2025-03-12

## 2025-03-12 NOTE — TELEPHONE ENCOUNTER
[facility] sent Rx request for the following:    **Patient requests 90 days supply**  Requested Prescriptions   Pending Prescriptions Disp Refills    rosuvastatin (CRESTOR) 10 MG tablet [Pharmacy Med Name: ROSUVASTATIN 10MG TABLETS] 112 tablet      Sig: TAKE 1 TABLET BY MOUTH EVERY OTHER DAY. IF TOLERATED AFTER 1 MONTH MAY INCREASE TO 20MG EVERY OTHER DAY       Antihyperlipidemic agents Failed - 3/12/2025  8:44 AM        Failed - Medication is active on med list and the sig matches. RN to manually verify dose and sig if red X/fail.     If the protocol passes (green check), you do not need to verify med dose and sig.    A prescription matches if they are the same clinical intention.    For Example: once daily and every morning are the same.    The protocol can not identify upper and lower case letters as matching and will fail.     For Example: Take 1 tablet (50 mg) by mouth daily     TAKE 1 TABLET (50 MG) BY MOUTH DAILY    For all fails (red x), verify dose and sig.    If the refill does match what is on file, the RN can still proceed to approve the refill request.       If they do not match, route to the appropriate provider.          Last Prescription Date:   3/10/25  Last Fill Qty/Refills:         90, R-4    Last Office Visit:                8/21/25 (hospital discharge)   2/27/24 (px)  Future Office visit:             Next 5 appointments (look out 90 days)      Mar 13, 2025 9:30 AM  (Arrive by 9:15 AM)  Return Visit with Sourav March DPM  [facility] ([facility] [address]  [place] [id number] [phone]           Per chart review, 3/10/25 new script was sent    Unable to complete prescription refill per RN Medication Refill Policy.     Matias Vergara, RN on 3/12/2025 at 8:52 AM

## 2025-03-13 ENCOUNTER — OFFICE VISIT (OUTPATIENT)
Dept: ORTHOPEDICS | Facility: OTHER | Age: 70
End: 2025-03-13
Attending: PODIATRIST
Payer: MEDICARE

## 2025-03-13 DIAGNOSIS — M19.071 ARTHROSIS OF ANKLE, RIGHT: Primary | ICD-10-CM

## 2025-03-13 PROCEDURE — 250N000011 HC RX IP 250 OP 636: Mod: JW | Performed by: PODIATRIST

## 2025-03-13 PROCEDURE — G0463 HOSPITAL OUTPT CLINIC VISIT: HCPCS

## 2025-03-13 PROCEDURE — 250N000009 HC RX 250: Mod: JW | Performed by: PODIATRIST

## 2025-03-13 RX ORDER — TRIAMCINOLONE ACETONIDE 40 MG/ML
20 INJECTION, SUSPENSION INTRA-ARTICULAR; INTRAMUSCULAR ONCE
Status: COMPLETED | OUTPATIENT
Start: 2025-03-13 | End: 2025-03-13

## 2025-03-13 RX ORDER — LIDOCAINE HYDROCHLORIDE 10 MG/ML
1 INJECTION, SOLUTION EPIDURAL; INFILTRATION; INTRACAUDAL; PERINEURAL ONCE
Status: COMPLETED | OUTPATIENT
Start: 2025-03-13 | End: 2025-03-13

## 2025-03-13 RX ADMIN — LIDOCAINE HYDROCHLORIDE 1 ML: 10 INJECTION, SOLUTION EPIDURAL; INFILTRATION; INTRACAUDAL; PERINEURAL at 10:06

## 2025-03-13 RX ADMIN — TRIAMCINOLONE ACETONIDE 20 MG: 40 INJECTION, SUSPENSION INTRA-ARTICULAR; INTRAMUSCULAR at 10:06

## 2025-03-13 NOTE — PROGRESS NOTES
SUBJECTIVE:  Laura is here requesting a right ankle injection.  She had an injection a year ago and lasted she thinks for 6 months unfortunately she fell this past summer broke her humerus which was fixed and generally doing well she is struggling with range of motion she continues to do therapy for this.  As she is gotten more active her ankle started to act up again it is quite symptomatic she has a hard time walking on this at this point.  She is ready to consider surgery given recent surgery.    ROS: Musculoskeletal and general review of systems are negative, per review of previous clinic questionnaire.  Denies SOB and calf pain.    EXAM:   PHYSICAL EXAMINATION:   CONSTITUTIONAL:  The patient is alert and oriented x 3, well appearing and in no apparent distress.  Affect is pleasant and answers questions appropriately.  VASCULAR:  Circulation is intact with palpable pedal pulses and adequate capillary fill time to all digits.  Hair growth is present and appropriate to mid foot and digits. Calf nontender.  NEUROLOGIC:  Light touch sensation is intact to digits.  There is a negative Tinel sign.    INTEGUMENT:  No abnormal dermatologic lesions are noted.  Skin has normal texture and turgor.    MUSCULOSKELETAL: Exam is unchanged she has midfoot instability very advanced bunion abduction valgus rotation of her hallux this is less symptomatic than her ankle she does have some instability her ankle she has crepitus and pain with passive range of motion with the ankle.    IMAGING: No new imaging today    ASSESSMENT: End-stage ankle arthrosis, midfoot instability advanced bunion    PLAN OF CARE: I discussed condition and treatment patient today.  Repeat injection performed in the ankle.  Again this ankle was quite painful for her at some point considering surgery on this is appropriate and she knows this and she would like to discuss this at some point but given the recent history of surgery and humerus fracture  understandable to hold off on this for as long as we can and as long she gets good results from an injection in the foot stays fairly rectus certainly we can hold off for a bit.  Reappoint as this injection wears off.    Sourav March, SHANTAL

## 2025-03-17 ENCOUNTER — TRANSFERRED RECORDS (OUTPATIENT)
Dept: HEALTH INFORMATION MANAGEMENT | Facility: OTHER | Age: 70
End: 2025-03-17
Payer: COMMERCIAL

## 2025-03-19 ENCOUNTER — THERAPY VISIT (OUTPATIENT)
Dept: PHYSICAL THERAPY | Facility: OTHER | Age: 70
End: 2025-03-19
Attending: ORTHOPAEDIC SURGERY
Payer: COMMERCIAL

## 2025-03-19 DIAGNOSIS — Z87.81 S/P ORIF (OPEN REDUCTION INTERNAL FIXATION) FRACTURE: Primary | ICD-10-CM

## 2025-03-19 DIAGNOSIS — Z98.890 S/P ORIF (OPEN REDUCTION INTERNAL FIXATION) FRACTURE: Primary | ICD-10-CM

## 2025-04-01 ENCOUNTER — THERAPY VISIT (OUTPATIENT)
Dept: PHYSICAL THERAPY | Facility: OTHER | Age: 70
End: 2025-04-01
Attending: ORTHOPAEDIC SURGERY
Payer: MEDICARE

## 2025-04-01 DIAGNOSIS — G89.29 CHRONIC PAIN OF RIGHT KNEE: ICD-10-CM

## 2025-04-01 DIAGNOSIS — Z87.81 S/P ORIF (OPEN REDUCTION INTERNAL FIXATION) FRACTURE: Primary | ICD-10-CM

## 2025-04-01 DIAGNOSIS — Z98.890 S/P ORIF (OPEN REDUCTION INTERNAL FIXATION) FRACTURE: Primary | ICD-10-CM

## 2025-04-01 DIAGNOSIS — M25.561 CHRONIC PAIN OF RIGHT KNEE: ICD-10-CM

## 2025-04-16 ENCOUNTER — THERAPY VISIT (OUTPATIENT)
Dept: PHYSICAL THERAPY | Facility: OTHER | Age: 70
End: 2025-04-16
Attending: ORTHOPAEDIC SURGERY
Payer: MEDICARE

## 2025-04-16 DIAGNOSIS — Z87.81 S/P ORIF (OPEN REDUCTION INTERNAL FIXATION) FRACTURE: Primary | ICD-10-CM

## 2025-04-16 DIAGNOSIS — Z98.890 S/P ORIF (OPEN REDUCTION INTERNAL FIXATION) FRACTURE: Primary | ICD-10-CM

## 2025-04-16 NOTE — PROGRESS NOTES
[facility]                                                                                   OUTPATIENT PHYSICAL THERAPY    PLAN OF TREATMENT FOR OUTPATIENT REHABILITATION   Patient's Last Name, First Name, Laura Tomas YOB: 1955   Provider's Name   [facility]   Medical Record No.  [id number]     Onset Date: 07/28/24  Start of Care Date: 09/19/24     Medical Diagnosis:  S/P ORIF (open reduction internal fixation) fracture (Z98.890, Z87.81)      PT Treatment Diagnosis:  Decreased range of motion and weakness status post humerus fracture Plan of Treatment  Frequency/Duration: 2 times a week/ 12 weeks    Certification date from 04/04/25 to 05/30/25         See note for plan of treatment details and functional goals     Adam Granado PT                         I CERTIFY THE NEED FOR THESE SERVICES FURNISHED UNDER        THIS PLAN OF TREATMENT AND WHILE UNDER MY CARE     (Physician attestation of this document indicates review and certification of the therapy plan).              Referring Provider:  Gwyn Hairston    Initial Assessment  See Epic Evaluation- Start of Care Date: 09/19/24            PLAN  Continue therapy per current plan of care.  Reduce frequency into D/C    Beginning/End Dates of Progress Note Reporting Period:  12/20/24 to 04/16/2025    Referring Provider:  Gwyn Hairston     04/16/25 0500   Appointment Info   Signing clinician's name / credentials Adam Granado DPT   Total/Authorized Visits 30   Visits Used 10/10   Medical Diagnosis S/P ORIF (open reduction internal fixation) fracture (Z98.890, Z87.81)   PT Tx Diagnosis Decreased range of motion and weakness status post humerus fracture   Progress Note/Certification   Start of Care Date 09/19/24   Onset of illness/injury or Date of Surgery 07/28/24   Therapy Frequency 2 times a week   Predicted Duration 12 weeks   Certification date from 04/04/25    Certification date to 05/30/25   Progress Note Completed Date 04/16/25  (Eval 9/19/24)   GOALS   PT Goals 2;3   PT Goal 1   Goal Identifier Home exercise program   Goal Description Patient will maintain home exercise program at least 3 times a week to improve range of motion and strength   Goal Progress toelrating and doing HEP often   Target Date 12/12/24   PT Goal 2   Goal Identifier Range of motion   Goal Description Range of motion will improve to at least 120 degrees flexion to allow patient to reach up into cupboards   Goal Progress AROM 100, PROM 150, behind back reach to L4, unable to reach behind head, can get to her ear at times.   Target Date 10/30/24   PT Goal 3   Goal Identifier Don doff clothing   Goal Description Patient will have no limits with range of motion or strength to allow her to don and doff clothing as well as do house chores with no limits   Goal Progress improvng ROM but still limits her activities, mostly don/doff head/hair and earing.   Target Date 12/12/24   Subjective Report   Subjective Report slow progress, working on strength machines at [facility],   Objective Measures   Objective Measures Objective Measure 1   Objective Measure 1   Details Shoulder flexion passively to 150 degrees and is able to passively also get the back Lspine  and top of her head,   PT Modalities   PT Modalities Hot Packs   Treatment Interventions (PT)   Interventions Therapeutic Procedure/Exercise;Manual Therapy   Therapeutic Procedure/Exercise   Therapeutic Procedures: strength, endurance, ROM, flexibility minutes (26891) 25   Ther Proc 1 - Details stepper 5 min, puelleys flexion and behind back stretch, wall slides for flexion stretch, wall ER stretch,   Skilled Intervention to improve ROM/strength   Patient Response/Progress tolerated well no pain noted   Manual Therapy   Manual Therapy: Mobilization, MFR, MLD, friction massage minutes (83042) 20   Manual Therapy 1 - Details seated and supine GHJ  mobs and PROM and overpressures with mobs.   Skilled Intervention to improve mobility and reduce pain.   Plan   Home program wall flexion and ER stretches.   Plan for next session ER stretch.   Comments   Comments another follow up in a few weeks.   Total Session Time   Timed Code Treatment Minutes 45   Total Treatment Time (sum of timed and untimed services) 45

## 2025-04-30 ENCOUNTER — LAB (OUTPATIENT)
Dept: LAB | Facility: OTHER | Age: 70
End: 2025-04-30
Attending: INTERNAL MEDICINE
Payer: MEDICARE

## 2025-04-30 ENCOUNTER — THERAPY VISIT (OUTPATIENT)
Dept: PHYSICAL THERAPY | Facility: OTHER | Age: 70
End: 2025-04-30
Attending: ORTHOPAEDIC SURGERY
Payer: MEDICARE

## 2025-04-30 DIAGNOSIS — Z87.81 S/P ORIF (OPEN REDUCTION INTERNAL FIXATION) FRACTURE: Primary | ICD-10-CM

## 2025-04-30 DIAGNOSIS — E78.00 HYPERCHOLESTEROLEMIA: ICD-10-CM

## 2025-04-30 DIAGNOSIS — Z98.890 S/P ORIF (OPEN REDUCTION INTERNAL FIXATION) FRACTURE: Primary | ICD-10-CM

## 2025-04-30 LAB
ALBUMIN SERPL BCG-MCNC: 4.4 G/DL (ref 3.5–5.2)
ALP SERPL-CCNC: 80 U/L (ref 40–150)
ALT SERPL W P-5'-P-CCNC: 20 U/L (ref 0–50)
AST SERPL W P-5'-P-CCNC: 17 U/L (ref 0–45)
BILIRUB DIRECT SERPL-MCNC: 0.16 MG/DL (ref 0–0.3)
BILIRUB SERPL-MCNC: 0.5 MG/DL
CHOLEST SERPL-MCNC: 192 MG/DL
FASTING STATUS PATIENT QL REPORTED: YES
HDLC SERPL-MCNC: 73 MG/DL
LDLC SERPL CALC-MCNC: 98 MG/DL
NONHDLC SERPL-MCNC: 119 MG/DL
PROT SERPL-MCNC: 7 G/DL (ref 6.4–8.3)
TRIGL SERPL-MCNC: 104 MG/DL

## 2025-04-30 PROCEDURE — 36415 COLL VENOUS BLD VENIPUNCTURE: CPT | Mod: ZL

## 2025-04-30 PROCEDURE — 82465 ASSAY BLD/SERUM CHOLESTEROL: CPT | Mod: ZL

## 2025-04-30 PROCEDURE — 84155 ASSAY OF PROTEIN SERUM: CPT | Mod: ZL

## 2025-04-30 NOTE — TELEPHONE ENCOUNTER
Recent PHQ 2/9 Score    PHQ 2:  Date Adult PHQ 2 Score Adult PHQ 2 Interpretation   9/22/2021 0 No further screening needed       PHQ 9:     
Routing refill request to provider for review/approval because:  Labs not current:  Lipid  LOV: 1/7/2020  Dr. Saad Barrera out of office will route to covering team let for review and consideration.  Anny Das RN on 6/16/2020 at 10:56 AM          
Detail Level: Zone

## 2025-05-13 ENCOUNTER — THERAPY VISIT (OUTPATIENT)
Dept: PHYSICAL THERAPY | Facility: OTHER | Age: 70
End: 2025-05-13
Attending: ORTHOPAEDIC SURGERY
Payer: MEDICARE

## 2025-05-13 DIAGNOSIS — Z87.81 S/P ORIF (OPEN REDUCTION INTERNAL FIXATION) FRACTURE: Primary | ICD-10-CM

## 2025-05-13 DIAGNOSIS — Z98.890 S/P ORIF (OPEN REDUCTION INTERNAL FIXATION) FRACTURE: Primary | ICD-10-CM

## 2025-05-27 ENCOUNTER — THERAPY VISIT (OUTPATIENT)
Dept: PHYSICAL THERAPY | Facility: OTHER | Age: 70
End: 2025-05-27
Attending: ORTHOPAEDIC SURGERY
Payer: COMMERCIAL

## 2025-05-27 DIAGNOSIS — Z98.890 S/P ORIF (OPEN REDUCTION INTERNAL FIXATION) FRACTURE: Primary | ICD-10-CM

## 2025-05-27 DIAGNOSIS — Z87.81 S/P ORIF (OPEN REDUCTION INTERNAL FIXATION) FRACTURE: Primary | ICD-10-CM

## 2025-05-27 NOTE — PROGRESS NOTES
PLAN  Discharge with HEP. Can call with any concerns.    Beginning/End Dates of Progress Note Reporting Period:  05/27/25 (Eval 9/19/24) to 05/27/2025    Referring Provider:  Gwyn Hairston    DISCHARGE  Reason for Discharge: No further expectation of progress.    Equipment Issued: NA    Discharge Plan: Patient to continue home program.    Referring Provider:  Gwyn Hairston     05/27/25 0500   Appointment Info   Signing clinician's name / credentials Adam Granado DPT   Total/Authorized Visits 33   Visits Used 3/10   Medical Diagnosis S/P ORIF (open reduction internal fixation) fracture (Z98.890, Z87.81)   PT Tx Diagnosis Decreased range of motion and weakness status post humerus fracture   Progress Note/Certification   Start of Care Date 09/19/24   Onset of illness/injury or Date of Surgery 07/28/24   Therapy Frequency 2 times a week   Predicted Duration 12 weeks   Certification date from 04/04/25   Certification date to 05/30/25   Progress Note Completed Date 05/27/25  ([facility] 9/19/24)   GOALS   PT Goals 2;3   PT Goal 1   Goal Identifier Home exercise program   Goal Description Patient will maintain home exercise program at least 3 times a week to improve range of motion and strength   Goal Progress doing well with HEP   Target Date 12/12/24   PT Goal 2   Goal Identifier Range of motion   Goal Description Range of motion will improve to at least 120 degrees flexion to allow patient to reach up into cupboards   Goal Progress AROM flexion 110, abduct 100, behind back to PSIS, behind head to ear/mastoid jodi.   Target Date 10/30/24   PT Goal 3   Goal Identifier Don doff clothing   Goal Description Patient will have no limits with range of motion or strength to allow her to don and doff clothing as well as do house chores with no limits   Goal Progress limited ROM with ER and elbow flexion to get behind her head well.   Target Date 12/12/24   Subjective Report   Subjective Report happy with where shoulder is at.    Objective Measures   Objective Measures Objective Measure 1   PT Modalities   PT Modalities Hot Packs   Treatment Interventions (PT)   Interventions Therapeutic Procedure/Exercise;Manual Therapy   Therapeutic Procedure/Exercise   Therapeutic Procedures: strength, endurance, ROM, flexibility minutes (77034) 25   Ther Proc 1 - Details arm bike 4 min, staning CW/CCW  1x1, pulleys flexion and behind back stretch, 7# bicep curls, 1# scaption, 2x15,   Skilled Intervention to improve ROM/strength   Patient Response/Progress tolerated well no pain noted   Manual Therapy   Manual Therapy: Mobilization, MFR, MLD, friction massage minutes (58622) 20   Manual Therapy 1 - Details seated and supine STM/MFR scap and detls/pec, ROM stretch for ER and elbow flex/ext.   Skilled Intervention to improve mobility and reduce pain.   Plan   Home program wall flexion and ER stretches.   Plan for next session D/C.   Comments   Comments D/C   Total Session Time   Timed Code Treatment Minutes 45   Total Treatment Time (sum of timed and untimed services) 45

## 2025-06-19 ENCOUNTER — LAB (OUTPATIENT)
Dept: LAB | Facility: OTHER | Age: 70
End: 2025-06-19
Attending: INTERNAL MEDICINE
Payer: MEDICARE

## 2025-06-19 DIAGNOSIS — E06.3 HYPOTHYROIDISM DUE TO HASHIMOTO'S THYROIDITIS: ICD-10-CM

## 2025-06-19 LAB
T4 FREE SERPL-MCNC: 1.12 NG/DL (ref 0.9–1.7)
TSH SERPL DL<=0.005 MIU/L-ACNC: 8.59 UIU/ML (ref 0.3–4.2)

## 2025-06-19 PROCEDURE — 84439 ASSAY OF FREE THYROXINE: CPT | Mod: ZL

## 2025-06-19 PROCEDURE — 36415 COLL VENOUS BLD VENIPUNCTURE: CPT | Mod: ZL

## 2025-06-19 PROCEDURE — 84443 ASSAY THYROID STIM HORMONE: CPT | Mod: ZL

## 2025-07-28 ENCOUNTER — PATIENT OUTREACH (OUTPATIENT)
Dept: GASTROENTEROLOGY | Facility: CLINIC | Age: 70
End: 2025-07-28
Payer: COMMERCIAL

## 2025-07-28 DIAGNOSIS — Z12.11 SPECIAL SCREENING FOR MALIGNANT NEOPLASMS, COLON: Primary | ICD-10-CM

## 2025-07-28 NOTE — PROGRESS NOTES
"CRC Screening Colonoscopy Referral Review    Patient meets the inclusion criteria for screening colonoscopy standing order.    Ordering/Referring Provider:  Susanne Gibbs      BMI: Estimated body mass index is 39.58 kg/m  as calculated from the following:    Height as of 3/3/25: 1.613 m (5' 3.5\").    Weight as of 3/3/25: 103 kg (227 lb).     Sedation:  Does patient have any of the following conditions affecting sedation?  No medical conditions affecting sedation.    Previous Scopes:  Any previous recommendations or follow up needs based on previous scope?  na / No recommendations.    Medical Concerns to Postpone Order:  Does patient have any of the following medical concerns that should postpone/delay colonoscopy referral?  No medical conditions affecting colonoscopy referral.    Final Referral Details:  Based on patient's medical history patient is appropriate for referral order with moderate sedation. If patient's BMI > 50 do not schedule in [facility].  "

## 2025-08-08 ENCOUNTER — THERAPY VISIT (OUTPATIENT)
Dept: PHYSICAL THERAPY | Facility: OTHER | Age: 70
End: 2025-08-08
Attending: ORTHOPAEDIC SURGERY
Payer: COMMERCIAL

## 2025-08-08 DIAGNOSIS — Z96.651 S/P TOTAL KNEE ARTHROPLASTY, RIGHT: ICD-10-CM

## 2025-08-12 ENCOUNTER — THERAPY VISIT (OUTPATIENT)
Dept: PHYSICAL THERAPY | Facility: OTHER | Age: 70
End: 2025-08-12
Attending: ORTHOPAEDIC SURGERY
Payer: MEDICARE

## 2025-08-12 DIAGNOSIS — Z96.651 S/P TOTAL KNEE ARTHROPLASTY, RIGHT: Primary | ICD-10-CM

## 2025-08-19 ENCOUNTER — THERAPY VISIT (OUTPATIENT)
Dept: PHYSICAL THERAPY | Facility: OTHER | Age: 70
End: 2025-08-19
Attending: ORTHOPAEDIC SURGERY
Payer: MEDICARE

## 2025-08-19 DIAGNOSIS — Z96.651 S/P TOTAL KNEE ARTHROPLASTY, RIGHT: Primary | ICD-10-CM

## 2025-08-21 ENCOUNTER — THERAPY VISIT (OUTPATIENT)
Dept: PHYSICAL THERAPY | Facility: OTHER | Age: 70
End: 2025-08-21
Attending: ORTHOPAEDIC SURGERY
Payer: MEDICARE

## 2025-08-21 DIAGNOSIS — Z96.651 S/P TOTAL KNEE ARTHROPLASTY, RIGHT: Primary | ICD-10-CM

## 2025-08-26 ENCOUNTER — THERAPY VISIT (OUTPATIENT)
Dept: PHYSICAL THERAPY | Facility: OTHER | Age: 70
End: 2025-08-26
Attending: ORTHOPAEDIC SURGERY
Payer: MEDICARE

## 2025-08-26 DIAGNOSIS — Z96.651 S/P TOTAL KNEE ARTHROPLASTY, RIGHT: Primary | ICD-10-CM

## 2025-08-26 PROCEDURE — 97110 THERAPEUTIC EXERCISES: CPT | Mod: GP | Performed by: PHYSICAL THERAPIST

## 2025-08-26 PROCEDURE — 97140 MANUAL THERAPY 1/> REGIONS: CPT | Mod: GP | Performed by: PHYSICAL THERAPIST

## 2025-09-02 ENCOUNTER — THERAPY VISIT (OUTPATIENT)
Dept: PHYSICAL THERAPY | Facility: OTHER | Age: 70
End: 2025-09-02
Attending: ORTHOPAEDIC SURGERY
Payer: COMMERCIAL

## 2025-09-02 DIAGNOSIS — Z96.651 S/P TOTAL KNEE ARTHROPLASTY, RIGHT: Primary | ICD-10-CM

## 2025-09-04 ENCOUNTER — THERAPY VISIT (OUTPATIENT)
Dept: PHYSICAL THERAPY | Facility: OTHER | Age: 70
End: 2025-09-04
Attending: ORTHOPAEDIC SURGERY
Payer: MEDICARE

## 2025-09-04 DIAGNOSIS — Z96.651 S/P TOTAL KNEE ARTHROPLASTY, RIGHT: Primary | ICD-10-CM

## (undated) DEVICE — GW VASC .035IN DIA 260CML 7CML 3 MM RADIUS J CURVE 502455

## (undated) DEVICE — ENDO BRUSH CHANNEL MASTER CLEANING 2-4.2MM BW-412T

## (undated) DEVICE — CATH ANGIO SUPERTORQUE PLUS JR4 6FRX100CM 533621

## (undated) DEVICE — ESU GROUND PAD ADULT W/CORD E7507

## (undated) DEVICE — ENDO KIT COMPLIANCE DYKENDOCMPLY

## (undated) DEVICE — CATH ANGIO SUPERTORQUE PLUS JL4 6FRX100CM 533620

## (undated) DEVICE — MANIFOLD KIT ANGIO AUTOMATED 014613

## (undated) DEVICE — ESU ENDO FORCEP BX HOT FD-210U

## (undated) DEVICE — PACK HEART LEFT CUSTOM

## (undated) DEVICE — SLEEVE TR BAND RADIAL COMPRESSION DEVICE 24CM TRB24-REG

## (undated) DEVICE — SUCTION MANIFOLD NEPTUNE 2 SYS 4 PORT 0702-020-000

## (undated) DEVICE — TUBING SUCTION 10'X3/16" N510

## (undated) DEVICE — SHTH INTRO 0.021IN ID 6FR DIA

## (undated) DEVICE — KIT HAND CONTROL ACIST 016795

## (undated) DEVICE — SOL WATER 1500ML

## (undated) DEVICE — TUBING PRESSURE 30"

## (undated) DEVICE — GLIDEWIRE TERUMO .035X180CM 1.5,, J-TIP GR3525

## (undated) RX ORDER — PROPOFOL 10 MG/ML
INJECTION, EMULSION INTRAVENOUS
Status: DISPENSED
Start: 2020-10-26

## (undated) RX ORDER — LIDOCAINE HYDROCHLORIDE 10 MG/ML
INJECTION, SOLUTION EPIDURAL; INFILTRATION; INTRACAUDAL; PERINEURAL
Status: DISPENSED
Start: 2024-03-14

## (undated) RX ORDER — NITROGLYCERIN 5 MG/ML
VIAL (ML) INTRAVENOUS
Status: DISPENSED
Start: 2023-09-05

## (undated) RX ORDER — TRIAMCINOLONE ACETONIDE 40 MG/ML
INJECTION, SUSPENSION INTRA-ARTICULAR; INTRAMUSCULAR
Status: DISPENSED
Start: 2024-03-14

## (undated) RX ORDER — LIDOCAINE HYDROCHLORIDE 10 MG/ML
INJECTION, SOLUTION EPIDURAL; INFILTRATION; INTRACAUDAL; PERINEURAL
Status: DISPENSED
Start: 2025-03-13

## (undated) RX ORDER — TRIAMCINOLONE ACETONIDE 40 MG/ML
INJECTION, SUSPENSION INTRA-ARTICULAR; INTRAMUSCULAR
Status: DISPENSED
Start: 2024-12-02

## (undated) RX ORDER — FENTANYL CITRATE 50 UG/ML
INJECTION, SOLUTION INTRAMUSCULAR; INTRAVENOUS
Status: DISPENSED
Start: 2024-07-28

## (undated) RX ORDER — TRIAMCINOLONE ACETONIDE 40 MG/ML
INJECTION, SUSPENSION INTRA-ARTICULAR; INTRAMUSCULAR
Status: DISPENSED
Start: 2025-03-13

## (undated) RX ORDER — LIDOCAINE HYDROCHLORIDE 10 MG/ML
INJECTION, SOLUTION INFILTRATION; PERINEURAL
Status: DISPENSED
Start: 2024-12-02

## (undated) RX ORDER — TRIAMCINOLONE ACETONIDE 40 MG/ML
INJECTION, SUSPENSION INTRA-ARTICULAR; INTRAMUSCULAR
Status: DISPENSED
Start: 2023-06-08

## (undated) RX ORDER — FENTANYL CITRATE 50 UG/ML
INJECTION, SOLUTION INTRAMUSCULAR; INTRAVENOUS
Status: DISPENSED
Start: 2024-07-29

## (undated) RX ORDER — SODIUM CHLORIDE 9 MG/ML
INJECTION, SOLUTION INTRAVENOUS
Status: DISPENSED
Start: 2023-09-05

## (undated) RX ORDER — HEPARIN SODIUM 1000 [USP'U]/ML
INJECTION, SOLUTION INTRAVENOUS; SUBCUTANEOUS
Status: DISPENSED
Start: 2023-09-05

## (undated) RX ORDER — NICARDIPINE HCL-0.9% SOD CHLOR 1 MG/10 ML
SYRINGE (ML) INTRAVENOUS
Status: DISPENSED
Start: 2023-09-05

## (undated) RX ORDER — ASPIRIN 81 MG/1
TABLET, CHEWABLE ORAL
Status: DISPENSED
Start: 2023-09-05

## (undated) RX ORDER — LIDOCAINE HYDROCHLORIDE 20 MG/ML
INJECTION, SOLUTION EPIDURAL; INFILTRATION; INTRACAUDAL; PERINEURAL
Status: DISPENSED
Start: 2020-10-26

## (undated) RX ORDER — LIDOCAINE HYDROCHLORIDE 10 MG/ML
INJECTION, SOLUTION EPIDURAL; INFILTRATION; INTRACAUDAL; PERINEURAL
Status: DISPENSED
Start: 2023-06-08

## (undated) RX ORDER — FENTANYL CITRATE 50 UG/ML
INJECTION, SOLUTION INTRAMUSCULAR; INTRAVENOUS
Status: DISPENSED
Start: 2023-09-05